# Patient Record
Sex: MALE | Race: WHITE | NOT HISPANIC OR LATINO | Employment: OTHER | URBAN - METROPOLITAN AREA
[De-identification: names, ages, dates, MRNs, and addresses within clinical notes are randomized per-mention and may not be internally consistent; named-entity substitution may affect disease eponyms.]

---

## 2023-01-01 ENCOUNTER — EVALUATION (OUTPATIENT)
Dept: OCCUPATIONAL THERAPY | Facility: CLINIC | Age: 88
End: 2023-01-01
Payer: MEDICARE

## 2023-01-01 ENCOUNTER — OFFICE VISIT (OUTPATIENT)
Dept: CARDIOLOGY | Facility: CLINIC | Age: 88
End: 2023-01-01
Payer: MEDICARE

## 2023-01-01 ENCOUNTER — OFFICE VISIT (OUTPATIENT)
Dept: PRIMARY CARE | Facility: CLINIC | Age: 88
End: 2023-01-01
Payer: MEDICARE

## 2023-01-01 ENCOUNTER — TELEPHONE (OUTPATIENT)
Dept: PRIMARY CARE | Facility: CLINIC | Age: 88
End: 2023-01-01
Payer: MEDICARE

## 2023-01-01 ENCOUNTER — LAB (OUTPATIENT)
Dept: LAB | Facility: LAB | Age: 88
End: 2023-01-01
Payer: MEDICARE

## 2023-01-01 ENCOUNTER — APPOINTMENT (OUTPATIENT)
Dept: PRIMARY CARE | Facility: CLINIC | Age: 88
End: 2023-01-01
Payer: MEDICARE

## 2023-01-01 ENCOUNTER — CLINICAL SUPPORT (OUTPATIENT)
Dept: CARDIOLOGY | Facility: CLINIC | Age: 88
End: 2023-01-01
Payer: MEDICARE

## 2023-01-01 VITALS
RESPIRATION RATE: 14 BRPM | HEART RATE: 64 BPM | SYSTOLIC BLOOD PRESSURE: 150 MMHG | BODY MASS INDEX: 27.85 KG/M2 | WEIGHT: 188 LBS | DIASTOLIC BLOOD PRESSURE: 80 MMHG | OXYGEN SATURATION: 96 % | HEIGHT: 69 IN | TEMPERATURE: 97.6 F

## 2023-01-01 VITALS
HEIGHT: 68 IN | DIASTOLIC BLOOD PRESSURE: 78 MMHG | RESPIRATION RATE: 14 BRPM | WEIGHT: 175 LBS | BODY MASS INDEX: 26.52 KG/M2 | SYSTOLIC BLOOD PRESSURE: 138 MMHG | HEART RATE: 100 BPM | OXYGEN SATURATION: 98 % | TEMPERATURE: 98 F

## 2023-01-01 VITALS
WEIGHT: 180 LBS | HEART RATE: 65 BPM | HEIGHT: 68 IN | SYSTOLIC BLOOD PRESSURE: 112 MMHG | BODY MASS INDEX: 27.28 KG/M2 | DIASTOLIC BLOOD PRESSURE: 62 MMHG

## 2023-01-01 VITALS
OXYGEN SATURATION: 99 % | HEART RATE: 65 BPM | BODY MASS INDEX: 26.81 KG/M2 | HEIGHT: 69 IN | SYSTOLIC BLOOD PRESSURE: 142 MMHG | WEIGHT: 181 LBS | DIASTOLIC BLOOD PRESSURE: 68 MMHG

## 2023-01-01 VITALS
WEIGHT: 180 LBS | BODY MASS INDEX: 26.66 KG/M2 | DIASTOLIC BLOOD PRESSURE: 82 MMHG | OXYGEN SATURATION: 96 % | HEIGHT: 69 IN | HEART RATE: 60 BPM | SYSTOLIC BLOOD PRESSURE: 148 MMHG

## 2023-01-01 DIAGNOSIS — R53.1 LEFT-SIDED WEAKNESS: ICD-10-CM

## 2023-01-01 DIAGNOSIS — R53.1 WEAKNESS: ICD-10-CM

## 2023-01-01 DIAGNOSIS — R79.89 LOW VITAMIN B12 LEVEL: ICD-10-CM

## 2023-01-01 DIAGNOSIS — N18.4 CKD (CHRONIC KIDNEY DISEASE) STAGE 4, GFR 15-29 ML/MIN (MULTI): ICD-10-CM

## 2023-01-01 DIAGNOSIS — M25.672 STIFFNESS OF BOTH ANKLE JOINTS: ICD-10-CM

## 2023-01-01 DIAGNOSIS — I69.354 HEMIPLEGIA AND HEMIPARESIS FOLLOWING CEREBRAL INFARCTION AFFECTING LEFT NON-DOMINANT SIDE (MULTI): ICD-10-CM

## 2023-01-01 DIAGNOSIS — E03.9 ACQUIRED HYPOTHYROIDISM: ICD-10-CM

## 2023-01-01 DIAGNOSIS — E03.9 HYPOTHYROIDISM, UNSPECIFIED TYPE: ICD-10-CM

## 2023-01-01 DIAGNOSIS — R23.4 SKIN TEXTURE CHANGES: ICD-10-CM

## 2023-01-01 DIAGNOSIS — I48.0 PAROXYSMAL ATRIAL FIBRILLATION (MULTI): ICD-10-CM

## 2023-01-01 DIAGNOSIS — H61.23 BILATERAL IMPACTED CERUMEN: ICD-10-CM

## 2023-01-01 DIAGNOSIS — R79.89 LOW VITAMIN B12 LEVEL: Primary | ICD-10-CM

## 2023-01-01 DIAGNOSIS — I25.10 CAD (CORONARY ARTERY DISEASE): ICD-10-CM

## 2023-01-01 DIAGNOSIS — I89.0 LYMPHEDEMA OF BOTH LOWER EXTREMITIES: Primary | ICD-10-CM

## 2023-01-01 DIAGNOSIS — I49.5 SINOATRIAL NODE DYSFUNCTION (MULTI): ICD-10-CM

## 2023-01-01 DIAGNOSIS — I63.50 CEREBROVASCULAR ACCIDENT (CVA) DUE TO OCCLUSION OF CEREBRAL ARTERY (MULTI): ICD-10-CM

## 2023-01-01 DIAGNOSIS — M25.671 STIFFNESS OF BOTH ANKLE JOINTS: ICD-10-CM

## 2023-01-01 DIAGNOSIS — I10 ESSENTIAL HYPERTENSION: ICD-10-CM

## 2023-01-01 DIAGNOSIS — D63.1 ANEMIA IN CHRONIC KIDNEY DISEASE (CODE): Primary | ICD-10-CM

## 2023-01-01 DIAGNOSIS — E11.22 TYPE 2 DIABETES MELLITUS WITH CHRONIC KIDNEY DISEASE, WITHOUT LONG-TERM CURRENT USE OF INSULIN, UNSPECIFIED CKD STAGE (MULTI): ICD-10-CM

## 2023-01-01 DIAGNOSIS — D64.9 ANEMIA, UNSPECIFIED TYPE: ICD-10-CM

## 2023-01-01 DIAGNOSIS — J44.9 CHRONIC OBSTRUCTIVE PULMONARY DISEASE, UNSPECIFIED COPD TYPE (MULTI): ICD-10-CM

## 2023-01-01 DIAGNOSIS — I49.5 SICK SINUS SYNDROME (MULTI): ICD-10-CM

## 2023-01-01 DIAGNOSIS — N25.81 SECONDARY HYPERPARATHYROIDISM OF RENAL ORIGIN (MULTI): ICD-10-CM

## 2023-01-01 DIAGNOSIS — K59.01 SLOW TRANSIT CONSTIPATION: ICD-10-CM

## 2023-01-01 DIAGNOSIS — N18.4 CHRONIC KIDNEY DISEASE, STAGE 4 (SEVERE) (MULTI): ICD-10-CM

## 2023-01-01 DIAGNOSIS — R26.81 UNSTEADY GAIT: ICD-10-CM

## 2023-01-01 DIAGNOSIS — I89.0 LYMPHEDEMA OF BOTH LOWER EXTREMITIES: ICD-10-CM

## 2023-01-01 DIAGNOSIS — N18.32 STAGE 3B CHRONIC KIDNEY DISEASE (MULTI): ICD-10-CM

## 2023-01-01 DIAGNOSIS — R07.9 CHEST PAIN, UNSPECIFIED TYPE: ICD-10-CM

## 2023-01-01 DIAGNOSIS — R06.09 DYSPNEA ON EXERTION: ICD-10-CM

## 2023-01-01 DIAGNOSIS — G63 POLYNEUROPATHY ASSOCIATED WITH UNDERLYING DISEASE (MULTI): ICD-10-CM

## 2023-01-01 DIAGNOSIS — Z95.0 PACEMAKER: Primary | ICD-10-CM

## 2023-01-01 DIAGNOSIS — R07.9 CHEST PAIN: Primary | ICD-10-CM

## 2023-01-01 DIAGNOSIS — R60.9 EDEMA, UNSPECIFIED TYPE: Primary | ICD-10-CM

## 2023-01-01 DIAGNOSIS — E78.5 DYSLIPIDEMIA: ICD-10-CM

## 2023-01-01 DIAGNOSIS — R60.9 EDEMA, UNSPECIFIED TYPE: ICD-10-CM

## 2023-01-01 DIAGNOSIS — H35.3211 EXUDATIVE AGE-RELATED MACULAR DEGENERATION, RIGHT EYE, WITH ACTIVE CHOROIDAL NEOVASCULARIZATION (MULTI): ICD-10-CM

## 2023-01-01 DIAGNOSIS — N18.5 CHRONIC KIDNEY DISEASE, STAGE 5 (MULTI): ICD-10-CM

## 2023-01-01 DIAGNOSIS — Z95.810 PRESENCE OF BIVENTRICULAR IMPLANTABLE CARDIOVERTER-DEFIBRILLATOR (ICD): Primary | ICD-10-CM

## 2023-01-01 DIAGNOSIS — R73.9 ELEVATED BLOOD SUGAR: ICD-10-CM

## 2023-01-01 DIAGNOSIS — I50.32 CHRONIC DIASTOLIC HEART FAILURE (MULTI): ICD-10-CM

## 2023-01-01 DIAGNOSIS — Z95.0 CARDIAC PACEMAKER: ICD-10-CM

## 2023-01-01 DIAGNOSIS — Z95.0 CARDIAC PACEMAKER IN SITU: ICD-10-CM

## 2023-01-01 DIAGNOSIS — R53.1 WEAKNESS GENERALIZED: ICD-10-CM

## 2023-01-01 LAB
ALBUMIN SERPL BCP-MCNC: 3.6 G/DL (ref 3.4–5)
ALP SERPL-CCNC: 93 U/L (ref 33–136)
ALT SERPL W P-5'-P-CCNC: 13 U/L (ref 10–52)
ANION GAP SERPL CALC-SCNC: 12 MMOL/L (ref 10–20)
AST SERPL W P-5'-P-CCNC: 17 U/L (ref 9–39)
BILIRUB SERPL-MCNC: 0.4 MG/DL (ref 0–1.2)
BUN SERPL-MCNC: 34 MG/DL (ref 6–23)
CALCIUM SERPL-MCNC: 8.3 MG/DL (ref 8.6–10.3)
CHLORIDE SERPL-SCNC: 109 MMOL/L (ref 98–107)
CO2 SERPL-SCNC: 25 MMOL/L (ref 21–32)
CREAT SERPL-MCNC: 2.14 MG/DL (ref 0.5–1.3)
ERYTHROCYTE [DISTWIDTH] IN BLOOD BY AUTOMATED COUNT: 13.8 % (ref 11.5–14.5)
EST. AVERAGE GLUCOSE BLD GHB EST-MCNC: 123 MG/DL
FERRITIN SERPL-MCNC: 109 NG/ML (ref 20–300)
GFR SERPL CREATININE-BSD FRML MDRD: 28 ML/MIN/1.73M*2
GLUCOSE SERPL-MCNC: 88 MG/DL (ref 74–99)
HBA1C MFR BLD: 5.9 %
HCT VFR BLD AUTO: 34 % (ref 41–52)
HGB BLD-MCNC: 10.5 G/DL (ref 13.5–17.5)
IRON SATN MFR SERPL: 24 % (ref 25–45)
IRON SERPL-MCNC: 62 UG/DL (ref 35–150)
MCH RBC QN AUTO: 32.2 PG (ref 26–34)
MCHC RBC AUTO-ENTMCNC: 30.9 G/DL (ref 32–36)
MCV RBC AUTO: 104 FL (ref 80–100)
NRBC BLD-RTO: 0 /100 WBCS (ref 0–0)
PHOSPHATE SERPL-MCNC: 3.8 MG/DL (ref 2.5–4.9)
PLATELET # BLD AUTO: 219 X10*3/UL (ref 150–450)
POTASSIUM SERPL-SCNC: 4.9 MMOL/L (ref 3.5–5.3)
PROT SERPL-MCNC: 6.1 G/DL (ref 6.4–8.2)
PTH-INTACT SERPL-MCNC: 175 PG/ML (ref 18.5–88)
RBC # BLD AUTO: 3.26 X10*6/UL (ref 4.5–5.9)
SODIUM SERPL-SCNC: 141 MMOL/L (ref 136–145)
TIBC SERPL-MCNC: 256 UG/DL (ref 240–445)
TSH SERPL-ACNC: 4.45 MIU/L (ref 0.44–3.98)
UIBC SERPL-MCNC: 194 UG/DL (ref 110–370)
WBC # BLD AUTO: 5.6 X10*3/UL (ref 4.4–11.3)

## 2023-01-01 PROCEDURE — 99214 OFFICE O/P EST MOD 30 MIN: CPT | Performed by: INTERNAL MEDICINE

## 2023-01-01 PROCEDURE — 1159F MED LIST DOCD IN RCRD: CPT | Performed by: INTERNAL MEDICINE

## 2023-01-01 PROCEDURE — 1160F RVW MEDS BY RX/DR IN RCRD: CPT | Performed by: INTERNAL MEDICINE

## 2023-01-01 PROCEDURE — 3074F SYST BP LT 130 MM HG: CPT | Performed by: INTERNAL MEDICINE

## 2023-01-01 PROCEDURE — 82728 ASSAY OF FERRITIN: CPT

## 2023-01-01 PROCEDURE — 93288 INTERROG EVL PM/LDLS PM IP: CPT | Performed by: INTERNAL MEDICINE

## 2023-01-01 PROCEDURE — 3075F SYST BP GE 130 - 139MM HG: CPT | Performed by: INTERNAL MEDICINE

## 2023-01-01 PROCEDURE — 3078F DIAST BP <80 MM HG: CPT | Performed by: INTERNAL MEDICINE

## 2023-01-01 PROCEDURE — 96372 THER/PROPH/DIAG INJ SC/IM: CPT | Performed by: INTERNAL MEDICINE

## 2023-01-01 PROCEDURE — 1157F ADVNC CARE PLAN IN RCRD: CPT | Performed by: INTERNAL MEDICINE

## 2023-01-01 PROCEDURE — 36415 COLL VENOUS BLD VENIPUNCTURE: CPT

## 2023-01-01 PROCEDURE — 1036F TOBACCO NON-USER: CPT | Performed by: INTERNAL MEDICINE

## 2023-01-01 PROCEDURE — 1126F AMNT PAIN NOTED NONE PRSNT: CPT | Performed by: INTERNAL MEDICINE

## 2023-01-01 PROCEDURE — 83036 HEMOGLOBIN GLYCOSYLATED A1C: CPT

## 2023-01-01 PROCEDURE — 90662 IIV NO PRSV INCREASED AG IM: CPT | Performed by: INTERNAL MEDICINE

## 2023-01-01 PROCEDURE — 3077F SYST BP >= 140 MM HG: CPT | Performed by: INTERNAL MEDICINE

## 2023-01-01 PROCEDURE — 99215 OFFICE O/P EST HI 40 MIN: CPT | Performed by: INTERNAL MEDICINE

## 2023-01-01 PROCEDURE — 83540 ASSAY OF IRON: CPT

## 2023-01-01 PROCEDURE — 3079F DIAST BP 80-89 MM HG: CPT | Performed by: INTERNAL MEDICINE

## 2023-01-01 PROCEDURE — 80053 COMPREHEN METABOLIC PANEL: CPT

## 2023-01-01 PROCEDURE — 99213 OFFICE O/P EST LOW 20 MIN: CPT | Performed by: INTERNAL MEDICINE

## 2023-01-01 PROCEDURE — 85027 COMPLETE CBC AUTOMATED: CPT

## 2023-01-01 PROCEDURE — 84443 ASSAY THYROID STIM HORMONE: CPT

## 2023-01-01 PROCEDURE — 83970 ASSAY OF PARATHORMONE: CPT

## 2023-01-01 PROCEDURE — G0008 ADMIN INFLUENZA VIRUS VAC: HCPCS | Performed by: INTERNAL MEDICINE

## 2023-01-01 PROCEDURE — 97166 OT EVAL MOD COMPLEX 45 MIN: CPT | Mod: GO

## 2023-01-01 PROCEDURE — 97535 SELF CARE MNGMENT TRAINING: CPT | Mod: GO

## 2023-01-01 PROCEDURE — 83550 IRON BINDING TEST: CPT

## 2023-01-01 PROCEDURE — 84100 ASSAY OF PHOSPHORUS: CPT

## 2023-01-01 RX ORDER — NEOMYCIN SULFATE, POLYMYXIN B SULFATE, AND DEXAMETHASONE 3.5; 10000; 1 MG/G; [USP'U]/G; MG/G
OINTMENT OPHTHALMIC
COMMUNITY
Start: 2023-01-01 | End: 2024-01-01 | Stop reason: ENTERED-IN-ERROR

## 2023-01-01 RX ORDER — AMOXICILLIN 500 MG/1
CAPSULE ORAL
COMMUNITY
Start: 2023-01-01 | End: 2023-01-01 | Stop reason: ALTCHOICE

## 2023-01-01 RX ORDER — CYANOCOBALAMIN 1000 UG/ML
1000 INJECTION, SOLUTION INTRAMUSCULAR; SUBCUTANEOUS ONCE
Status: COMPLETED | OUTPATIENT
Start: 2023-01-01 | End: 2023-01-01

## 2023-01-01 RX ORDER — LEVOTHYROXINE SODIUM 50 UG/1
TABLET ORAL
Qty: 30 TABLET | Refills: 0 | Status: CANCELLED | OUTPATIENT
Start: 2023-01-01

## 2023-01-01 RX ORDER — DIGOXIN 0.25 MG/ML
INJECTION INTRAMUSCULAR; INTRAVENOUS
COMMUNITY
Start: 2005-03-10 | End: 2023-01-01 | Stop reason: ALTCHOICE

## 2023-01-01 RX ORDER — NITROGLYCERIN 0.4 MG/1
0.4 TABLET SUBLINGUAL EVERY 5 MIN PRN
COMMUNITY
Start: 2023-02-14 | End: 2023-01-01 | Stop reason: SDUPTHER

## 2023-01-01 RX ORDER — ROSUVASTATIN CALCIUM 10 MG/1
10 TABLET, COATED ORAL NIGHTLY
COMMUNITY
End: 2023-01-01 | Stop reason: ALTCHOICE

## 2023-01-01 RX ORDER — FUROSEMIDE 20 MG/1
20 TABLET ORAL DAILY PRN
Qty: 30 TABLET | Refills: 11 | Status: ON HOLD | OUTPATIENT
Start: 2023-01-01 | End: 2024-01-01 | Stop reason: SDUPTHER

## 2023-01-01 RX ORDER — LEVOTHYROXINE SODIUM 50 UG/1
TABLET ORAL
Qty: 30 TABLET | Refills: 0 | Status: SHIPPED | OUTPATIENT
Start: 2023-01-01 | End: 2023-01-01 | Stop reason: ALTCHOICE

## 2023-01-01 RX ORDER — ATORVASTATIN CALCIUM 40 MG/1
40 TABLET, FILM COATED ORAL
COMMUNITY
Start: 2017-07-11 | End: 2023-01-01 | Stop reason: ALTCHOICE

## 2023-01-01 RX ORDER — LEVOTHYROXINE SODIUM 25 UG/1
TABLET ORAL
COMMUNITY
Start: 2006-09-27 | End: 2023-01-01 | Stop reason: ALTCHOICE

## 2023-01-01 RX ORDER — NITROGLYCERIN 0.4 MG/1
0.4 TABLET SUBLINGUAL EVERY 5 MIN PRN
Qty: 90 TABLET | Refills: 0 | Status: SHIPPED | OUTPATIENT
Start: 2023-01-01 | End: 2024-01-01

## 2023-01-01 RX ORDER — TAMSULOSIN HYDROCHLORIDE 0.4 MG/1
CAPSULE ORAL
COMMUNITY
Start: 2005-03-10 | End: 2023-01-01 | Stop reason: ALTCHOICE

## 2023-01-01 RX ORDER — ATORVASTATIN CALCIUM 10 MG/1
10 TABLET, FILM COATED ORAL DAILY
COMMUNITY
Start: 2005-03-10 | End: 2023-01-01 | Stop reason: ALTCHOICE

## 2023-01-01 RX ORDER — LISINOPRIL 10 MG/1
10 TABLET ORAL
COMMUNITY
Start: 2017-08-01 | End: 2023-01-01 | Stop reason: ALTCHOICE

## 2023-01-01 RX ORDER — METOPROLOL TARTRATE 50 MG/1
50 TABLET ORAL 2 TIMES DAILY
COMMUNITY

## 2023-01-01 RX ORDER — LEVOTHYROXINE SODIUM 50 UG/1
50 TABLET ORAL
Qty: 90 TABLET | Refills: 3 | Status: SHIPPED | OUTPATIENT
Start: 2023-01-01 | End: 2023-01-01

## 2023-01-01 RX ORDER — LEVOTHYROXINE SODIUM 75 UG/1
75 TABLET ORAL DAILY
Qty: 30 TABLET | Refills: 11 | Status: SHIPPED | OUTPATIENT
Start: 2023-01-01 | End: 2024-06-01

## 2023-01-01 RX ORDER — ERYTHROMYCIN 5 MG/G
OINTMENT OPHTHALMIC
COMMUNITY
Start: 2023-04-24 | End: 2024-01-01 | Stop reason: ENTERED-IN-ERROR

## 2023-01-01 RX ADMIN — CYANOCOBALAMIN 1000 MCG: 1000 INJECTION, SOLUTION INTRAMUSCULAR; SUBCUTANEOUS at 13:24

## 2023-01-01 ASSESSMENT — ENCOUNTER SYMPTOMS
WHEEZING: 0
CONSTIPATION: 0
GASTROINTESTINAL NEGATIVE: 1
ABDOMINAL PAIN: 0
PSYCHIATRIC NEGATIVE: 1
RESPIRATORY NEGATIVE: 1
HEMATOLOGIC/LYMPHATIC NEGATIVE: 1
PALPITATIONS: 0
OCCASIONAL FEELINGS OF UNSTEADINESS: 1
ENDOCRINE NEGATIVE: 1
COUGH: 0
BLOOD IN STOOL: 0
OCCASIONAL FEELINGS OF UNSTEADINESS: 1
SHORTNESS OF BREATH: 0
EYES NEGATIVE: 1
UNEXPECTED WEIGHT CHANGE: 1
MUSCULOSKELETAL NEGATIVE: 1
CONSTITUTIONAL NEGATIVE: 1
DEPRESSION: 1
RHINORRHEA: 1
WHEEZING: 0
COUGH: 0
LOSS OF SENSATION IN FEET: 0
DIARRHEA: 0
NEUROLOGICAL NEGATIVE: 1
HYPERTENSION: 1
SHORTNESS OF BREATH: 0
PALPITATIONS: 0
LOSS OF SENSATION IN FEET: 0
DEPRESSION: 1

## 2023-01-01 ASSESSMENT — PAIN SCALES - GENERAL: PAINLEVEL: 0-NO PAIN

## 2023-01-01 ASSESSMENT — ACTIVITIES OF DAILY LIVING (ADL): HOME_MANAGEMENT_TIME_ENTRY: 10

## 2023-03-27 ENCOUNTER — TELEPHONE (OUTPATIENT)
Dept: PRIMARY CARE | Facility: CLINIC | Age: 88
End: 2023-03-27
Payer: MEDICARE

## 2023-03-27 NOTE — TELEPHONE ENCOUNTER
Pt called about an eye dr that was referred to him - he does not recall the name - please advise.

## 2023-04-27 LAB
ALANINE AMINOTRANSFERASE (SGPT) (U/L) IN SER/PLAS: 16 U/L (ref 10–52)
ALBUMIN (G/DL) IN SER/PLAS: 3.8 G/DL (ref 3.4–5)
ALBUMIN (MG/L) IN URINE: 454.9 MG/L
ALBUMIN/CREATININE (UG/MG) IN URINE: 619.8 UG/MG CRT (ref 0–30)
ALKALINE PHOSPHATASE (U/L) IN SER/PLAS: 78 U/L (ref 33–136)
ANION GAP IN SER/PLAS: 11 MMOL/L (ref 10–20)
APPEARANCE, URINE: ABNORMAL
ASPARTATE AMINOTRANSFERASE (SGOT) (U/L) IN SER/PLAS: 22 U/L (ref 9–39)
BILIRUBIN TOTAL (MG/DL) IN SER/PLAS: 0.4 MG/DL (ref 0–1.2)
BILIRUBIN, URINE: NEGATIVE
BLOOD, URINE: NEGATIVE
CALCIUM (MG/DL) IN SER/PLAS: 8.7 MG/DL (ref 8.6–10.3)
CARBON DIOXIDE, TOTAL (MMOL/L) IN SER/PLAS: 26 MMOL/L (ref 21–32)
CHLORIDE (MMOL/L) IN SER/PLAS: 108 MMOL/L (ref 98–107)
CHOLESTEROL (MG/DL) IN SER/PLAS: 138 MG/DL (ref 0–199)
CHOLESTEROL IN HDL (MG/DL) IN SER/PLAS: 51.3 MG/DL
CHOLESTEROL/HDL RATIO: 2.7
COBALAMIN (VITAMIN B12) (PG/ML) IN SER/PLAS: 287 PG/ML (ref 211–911)
COLOR, URINE: YELLOW
CREATININE (MG/DL) IN SER/PLAS: 1.84 MG/DL (ref 0.5–1.3)
CREATININE (MG/DL) IN URINE: 73.4 MG/DL (ref 20–370)
CREATININE (MG/DL) IN URINE: 73.4 MG/DL (ref 20–370)
ERYTHROCYTE DISTRIBUTION WIDTH (RATIO) BY AUTOMATED COUNT: 13.8 % (ref 11.5–14.5)
ERYTHROCYTE MEAN CORPUSCULAR HEMOGLOBIN CONCENTRATION (G/DL) BY AUTOMATED: 30.9 G/DL (ref 32–36)
ERYTHROCYTE MEAN CORPUSCULAR VOLUME (FL) BY AUTOMATED COUNT: 104 FL (ref 80–100)
ERYTHROCYTES (10*6/UL) IN BLOOD BY AUTOMATED COUNT: 3.13 X10E12/L (ref 4.5–5.9)
ESTIMATED AVERAGE GLUCOSE FOR HBA1C: 126 MG/DL
GFR MALE: 34 ML/MIN/1.73M2
GLUCOSE (MG/DL) IN SER/PLAS: 72 MG/DL (ref 74–99)
GLUCOSE, URINE: NEGATIVE MG/DL
HEMATOCRIT (%) IN BLOOD BY AUTOMATED COUNT: 32.4 % (ref 41–52)
HEMOGLOBIN (G/DL) IN BLOOD: 10 G/DL (ref 13.5–17.5)
HEMOGLOBIN A1C/HEMOGLOBIN TOTAL IN BLOOD: 6 %
IRON (UG/DL) IN SER/PLAS: 68 UG/DL (ref 35–150)
KETONES, URINE: NEGATIVE MG/DL
LDL: 67 MG/DL (ref 0–99)
LEUKOCYTE ESTERASE, URINE: NEGATIVE
LEUKOCYTES (10*3/UL) IN BLOOD BY AUTOMATED COUNT: 5 X10E9/L (ref 4.4–11.3)
NITRITE, URINE: NEGATIVE
PH, URINE: 6 (ref 5–8)
PHOSPHATE (MG/DL) IN SER/PLAS: 3.6 MG/DL (ref 2.5–4.9)
PLATELETS (10*3/UL) IN BLOOD AUTOMATED COUNT: 176 X10E9/L (ref 150–450)
POTASSIUM (MMOL/L) IN SER/PLAS: 4.7 MMOL/L (ref 3.5–5.3)
PROTEIN (MG/DL) IN URINE: 86 MG/DL (ref 5–25)
PROTEIN TOTAL: 6.5 G/DL (ref 6.4–8.2)
PROTEIN, URINE: ABNORMAL MG/DL
PROTEIN/CREATININE (MG/MG) IN URINE: 1.17 MG/MG CREAT (ref 0–0.17)
RBC, URINE: 1 /HPF (ref 0–5)
SODIUM (MMOL/L) IN SER/PLAS: 140 MMOL/L (ref 136–145)
SPECIFIC GRAVITY, URINE: 1.02 (ref 1–1.03)
THYROTROPIN (MIU/L) IN SER/PLAS BY DETECTION LIMIT <= 0.05 MIU/L: 3.92 MIU/L (ref 0.44–3.98)
TRIGLYCERIDE (MG/DL) IN SER/PLAS: 101 MG/DL (ref 0–149)
URATE (MG/DL) IN SER/PLAS: 5.4 MG/DL (ref 4–7.5)
UREA NITROGEN (MG/DL) IN SER/PLAS: 28 MG/DL (ref 6–23)
UROBILINOGEN, URINE: <2 MG/DL (ref 0–1.9)
VLDL: 20 MG/DL (ref 0–40)
WBC, URINE: 1 /HPF (ref 0–5)

## 2023-04-28 LAB — PARATHYRIN INTACT (PG/ML) IN SER/PLAS: 173.2 PG/ML (ref 18.5–88)

## 2023-05-04 ENCOUNTER — APPOINTMENT (OUTPATIENT)
Dept: PRIMARY CARE | Facility: CLINIC | Age: 88
End: 2023-05-04
Payer: MEDICARE

## 2023-05-08 PROBLEM — M25.672 ANKLE JOINT STIFFNESS, BILATERAL: Status: ACTIVE | Noted: 2023-05-08

## 2023-05-08 PROBLEM — K92.1 HEMATOCHEZIA: Status: ACTIVE | Noted: 2023-05-08

## 2023-05-08 PROBLEM — K59.01 SLOW TRANSIT CONSTIPATION: Status: ACTIVE | Noted: 2023-05-08

## 2023-05-08 PROBLEM — G45.9 TIA (TRANSIENT ISCHEMIC ATTACK): Status: ACTIVE | Noted: 2023-05-08

## 2023-05-08 PROBLEM — R09.89 BRUIT OF RIGHT CAROTID ARTERY: Status: ACTIVE | Noted: 2023-05-08

## 2023-05-08 PROBLEM — M25.661 KNEE JOINT STIFFNESS, BILATERAL: Status: ACTIVE | Noted: 2023-05-08

## 2023-05-08 PROBLEM — I89.0 LYMPHEDEMA, LIMB: Status: ACTIVE | Noted: 2023-05-08

## 2023-05-08 PROBLEM — I10 ESSENTIAL HYPERTENSION: Status: ACTIVE | Noted: 2023-05-08

## 2023-05-08 PROBLEM — R29.3 ABNORMAL POSTURE: Status: ACTIVE | Noted: 2023-05-08

## 2023-05-08 PROBLEM — I61.9 CEREBRAL HEMORRHAGE (MULTI): Status: ACTIVE | Noted: 2023-05-08

## 2023-05-08 PROBLEM — M54.2 CERVICALGIA: Status: ACTIVE | Noted: 2023-05-08

## 2023-05-08 PROBLEM — E03.9 HYPOTHYROIDISM: Status: ACTIVE | Noted: 2023-05-08

## 2023-05-08 PROBLEM — I50.32 CHRONIC DIASTOLIC HEART FAILURE (MULTI): Status: ACTIVE | Noted: 2023-05-08

## 2023-05-08 PROBLEM — R53.1 LEFT-SIDED WEAKNESS: Status: ACTIVE | Noted: 2023-05-08

## 2023-05-08 PROBLEM — G62.9 PERIPHERAL NEUROPATHY: Status: ACTIVE | Noted: 2023-05-08

## 2023-05-08 PROBLEM — Z95.0 CARDIAC PACEMAKER: Status: ACTIVE | Noted: 2023-05-08

## 2023-05-08 PROBLEM — K59.00 CONSTIPATION: Status: ACTIVE | Noted: 2023-05-08

## 2023-05-08 PROBLEM — S22.009A THORACIC VERTEBRAL FRACTURE (MULTI): Status: ACTIVE | Noted: 2023-05-08

## 2023-05-08 PROBLEM — M25.671 ANKLE JOINT STIFFNESS, BILATERAL: Status: ACTIVE | Noted: 2023-05-08

## 2023-05-08 PROBLEM — M79.606 LEG PAIN: Status: ACTIVE | Noted: 2023-05-08

## 2023-05-08 PROBLEM — R26.9 ABNORMAL GAIT: Status: ACTIVE | Noted: 2023-05-08

## 2023-05-08 PROBLEM — I48.0 PAROXYSMAL ATRIAL FIBRILLATION (MULTI): Status: ACTIVE | Noted: 2023-05-08

## 2023-05-08 PROBLEM — M25.662 KNEE JOINT STIFFNESS, BILATERAL: Status: ACTIVE | Noted: 2023-05-08

## 2023-05-08 PROBLEM — R23.4 SKIN TEXTURE CHANGES: Status: ACTIVE | Noted: 2023-05-08

## 2023-05-08 PROBLEM — R04.0 EPISTAXIS: Status: ACTIVE | Noted: 2023-05-08

## 2023-05-08 PROBLEM — H61.23 IMPACTED CERUMEN OF BOTH EARS: Status: RESOLVED | Noted: 2023-05-08 | Resolved: 2023-05-08

## 2023-05-08 PROBLEM — I83.891 VARICOSE VEINS OF RIGHT LOWER EXTREMITY WITH COMPLICATIONS: Status: ACTIVE | Noted: 2023-05-08

## 2023-05-08 PROBLEM — E78.5 DYSLIPIDEMIA: Status: ACTIVE | Noted: 2023-05-08

## 2023-05-08 PROBLEM — I49.5 SICK SINUS SYNDROME DUE TO SA NODE DYSFUNCTION (MULTI): Status: ACTIVE | Noted: 2023-05-08

## 2023-05-08 PROBLEM — R60.9 EDEMA: Status: ACTIVE | Noted: 2023-05-08

## 2023-05-08 PROBLEM — L03.119 CELLULITIS, LEG: Status: ACTIVE | Noted: 2023-05-08

## 2023-05-08 PROBLEM — I63.9 CVA (CEREBRAL VASCULAR ACCIDENT) (MULTI): Status: ACTIVE | Noted: 2023-05-08

## 2023-05-08 PROBLEM — R73.9 ELEVATED BLOOD SUGAR: Status: ACTIVE | Noted: 2023-05-08

## 2023-05-08 PROBLEM — I89.0 LYMPHEDEMA OF BOTH LOWER EXTREMITIES: Status: ACTIVE | Noted: 2023-05-08

## 2023-05-08 PROBLEM — Z95.1 S/P CABG X 3: Status: ACTIVE | Noted: 2023-05-08

## 2023-05-08 PROBLEM — R53.1 WEAKNESS: Status: ACTIVE | Noted: 2023-05-08

## 2023-05-08 PROBLEM — J30.0 VASOMOTOR RHINITIS: Status: ACTIVE | Noted: 2023-05-08

## 2023-05-08 PROBLEM — K62.5 RECTAL BLEEDING: Status: ACTIVE | Noted: 2023-05-08

## 2023-05-08 PROBLEM — N18.31 STAGE 3A CHRONIC KIDNEY DISEASE (MULTI): Status: ACTIVE | Noted: 2023-05-08

## 2023-05-08 PROBLEM — R27.8 DECREASED COORDINATION: Status: ACTIVE | Noted: 2023-05-08

## 2023-05-08 PROBLEM — R21 RASH: Status: ACTIVE | Noted: 2023-05-08

## 2023-05-08 PROBLEM — R26.81 UNSTEADY GAIT: Status: ACTIVE | Noted: 2023-05-08

## 2023-05-08 PROBLEM — H61.22 IMPACTED CERUMEN OF LEFT EAR: Status: RESOLVED | Noted: 2023-05-08 | Resolved: 2023-05-08

## 2023-05-08 PROBLEM — D64.9 ANEMIA: Status: ACTIVE | Noted: 2023-05-08

## 2023-05-08 PROBLEM — I25.10 CAD (CORONARY ARTERY DISEASE): Status: ACTIVE | Noted: 2023-05-08

## 2023-05-08 RX ORDER — LEVOTHYROXINE SODIUM 50 UG/1
1 TABLET ORAL DAILY
COMMUNITY
Start: 2013-10-21 | End: 2023-05-09 | Stop reason: SDUPTHER

## 2023-05-08 RX ORDER — FERROUS SULFATE 325(65) MG
TABLET ORAL
COMMUNITY
End: 2024-01-01 | Stop reason: ALTCHOICE

## 2023-05-08 RX ORDER — METOPROLOL TARTRATE 25 MG/1
1 TABLET, FILM COATED ORAL 2 TIMES DAILY
COMMUNITY
Start: 2019-09-10 | End: 2023-01-01 | Stop reason: ALTCHOICE

## 2023-05-08 RX ORDER — VIT C/E/ZN/COPPR/LUTEIN/ZEAXAN 250MG-90MG
CAPSULE ORAL 2 TIMES DAILY
COMMUNITY

## 2023-05-08 RX ORDER — ISOSORBIDE MONONITRATE 60 MG/1
1 TABLET, EXTENDED RELEASE ORAL 2 TIMES DAILY
COMMUNITY
Start: 2021-03-17

## 2023-05-08 RX ORDER — ROSUVASTATIN CALCIUM 10 MG/1
TABLET, COATED ORAL
COMMUNITY
End: 2023-05-09 | Stop reason: SINTOL

## 2023-05-09 ENCOUNTER — OFFICE VISIT (OUTPATIENT)
Dept: PRIMARY CARE | Facility: CLINIC | Age: 88
End: 2023-05-09
Payer: MEDICARE

## 2023-05-09 VITALS
DIASTOLIC BLOOD PRESSURE: 80 MMHG | BODY MASS INDEX: 26.98 KG/M2 | TEMPERATURE: 98 F | HEIGHT: 68 IN | SYSTOLIC BLOOD PRESSURE: 130 MMHG | OXYGEN SATURATION: 98 % | WEIGHT: 178 LBS | HEART RATE: 70 BPM | RESPIRATION RATE: 14 BRPM

## 2023-05-09 DIAGNOSIS — I10 ESSENTIAL HYPERTENSION: Primary | ICD-10-CM

## 2023-05-09 DIAGNOSIS — I49.5 SICK SINUS SYNDROME DUE TO SA NODE DYSFUNCTION (MULTI): ICD-10-CM

## 2023-05-09 DIAGNOSIS — E03.9 HYPOTHYROIDISM, UNSPECIFIED TYPE: ICD-10-CM

## 2023-05-09 DIAGNOSIS — D64.9 ANEMIA, UNSPECIFIED TYPE: ICD-10-CM

## 2023-05-09 DIAGNOSIS — R79.89 LOW VITAMIN B12 LEVEL: ICD-10-CM

## 2023-05-09 DIAGNOSIS — I50.32 CHRONIC DIASTOLIC HEART FAILURE (MULTI): ICD-10-CM

## 2023-05-09 DIAGNOSIS — N18.4 CKD (CHRONIC KIDNEY DISEASE) STAGE 4, GFR 15-29 ML/MIN (MULTI): ICD-10-CM

## 2023-05-09 PROCEDURE — 3079F DIAST BP 80-89 MM HG: CPT | Performed by: INTERNAL MEDICINE

## 2023-05-09 PROCEDURE — 99214 OFFICE O/P EST MOD 30 MIN: CPT | Performed by: INTERNAL MEDICINE

## 2023-05-09 PROCEDURE — 1170F FXNL STATUS ASSESSED: CPT | Performed by: INTERNAL MEDICINE

## 2023-05-09 PROCEDURE — G0439 PPPS, SUBSEQ VISIT: HCPCS | Performed by: INTERNAL MEDICINE

## 2023-05-09 PROCEDURE — 1160F RVW MEDS BY RX/DR IN RCRD: CPT | Performed by: INTERNAL MEDICINE

## 2023-05-09 PROCEDURE — 96372 THER/PROPH/DIAG INJ SC/IM: CPT | Performed by: INTERNAL MEDICINE

## 2023-05-09 PROCEDURE — 1159F MED LIST DOCD IN RCRD: CPT | Performed by: INTERNAL MEDICINE

## 2023-05-09 PROCEDURE — 1157F ADVNC CARE PLAN IN RCRD: CPT | Performed by: INTERNAL MEDICINE

## 2023-05-09 PROCEDURE — 1036F TOBACCO NON-USER: CPT | Performed by: INTERNAL MEDICINE

## 2023-05-09 PROCEDURE — 3075F SYST BP GE 130 - 139MM HG: CPT | Performed by: INTERNAL MEDICINE

## 2023-05-09 RX ORDER — MULTIVIT-MIN/FA/LYCOPEN/LUTEIN .4-300-25
TABLET ORAL
COMMUNITY
Start: 2005-03-10 | End: 2023-01-01 | Stop reason: ALTCHOICE

## 2023-05-09 RX ORDER — CYANOCOBALAMIN 1000 UG/ML
1000 INJECTION, SOLUTION INTRAMUSCULAR; SUBCUTANEOUS ONCE
Status: COMPLETED | OUTPATIENT
Start: 2023-05-09 | End: 2023-05-09

## 2023-05-09 RX ORDER — LANOLIN ALCOHOL/MO/W.PET/CERES
100 CREAM (GRAM) TOPICAL DAILY
COMMUNITY
End: 2023-01-01 | Stop reason: ALTCHOICE

## 2023-05-09 RX ORDER — LEVOTHYROXINE SODIUM 50 UG/1
50 TABLET ORAL DAILY
Qty: 30 TABLET | Refills: 3 | Status: SHIPPED | OUTPATIENT
Start: 2023-05-09 | End: 2023-01-01

## 2023-05-09 RX ORDER — DOCUSATE SODIUM 100 MG/1
100 CAPSULE, LIQUID FILLED ORAL 2 TIMES DAILY PRN
COMMUNITY
Start: 2005-03-10

## 2023-05-09 RX ADMIN — CYANOCOBALAMIN 1000 MCG: 1000 INJECTION, SOLUTION INTRAMUSCULAR; SUBCUTANEOUS at 14:54

## 2023-05-09 ASSESSMENT — ENCOUNTER SYMPTOMS
SHORTNESS OF BREATH: 0
PALPITATIONS: 0
OCCASIONAL FEELINGS OF UNSTEADINESS: 0
ABDOMINAL PAIN: 0
DEPRESSION: 0
LOSS OF SENSATION IN FEET: 0
WHEEZING: 0

## 2023-05-09 ASSESSMENT — PATIENT HEALTH QUESTIONNAIRE - PHQ9
10. IF YOU CHECKED OFF ANY PROBLEMS, HOW DIFFICULT HAVE THESE PROBLEMS MADE IT FOR YOU TO DO YOUR WORK, TAKE CARE OF THINGS AT HOME, OR GET ALONG WITH OTHER PEOPLE: NOT DIFFICULT AT ALL
SUM OF ALL RESPONSES TO PHQ9 QUESTIONS 1 AND 2: 2
1. LITTLE INTEREST OR PLEASURE IN DOING THINGS: SEVERAL DAYS
2. FEELING DOWN, DEPRESSED OR HOPELESS: SEVERAL DAYS

## 2023-05-09 ASSESSMENT — ACTIVITIES OF DAILY LIVING (ADL)
BATHING: INDEPENDENT
DRESSING: INDEPENDENT

## 2023-05-09 NOTE — PROGRESS NOTES
"Subjective   Patient ID: Amari Boyer is a 89 y.o. male who presents for anemia, swelling and CKD follow up as well as Medicare Annual Wellness Visit Subsequent (Medicare wellness.).  Swelling and redness of legs is much improved.    He has been feeling pretty well.  Complains of getting cold easily.    We reviewed and discussed his recent lab results.      Review of Systems   Respiratory:  Negative for shortness of breath and wheezing.    Cardiovascular:  Negative for chest pain and palpitations.   Gastrointestinal:  Negative for abdominal pain.       Objective   /80 (BP Location: Left arm, Patient Position: Sitting, BP Cuff Size: Adult)   Pulse 70   Temp 36.7 °C (98 °F) (Tympanic)   Resp 14   Ht 1.727 m (5' 8\")   Wt 80.7 kg (178 lb)   SpO2 98%   BMI 27.06 kg/m²     Physical Exam  Constitutional:       General: He is not in acute distress.     Appearance: Normal appearance. He is not ill-appearing.   HENT:      Head: Normocephalic and atraumatic.      Nose: Nose normal.   Eyes:      Extraocular Movements: Extraocular movements intact.      Conjunctiva/sclera: Conjunctivae normal.      Pupils: Pupils are equal, round, and reactive to light.   Cardiovascular:      Rate and Rhythm: Normal rate.   Pulmonary:      Effort: Pulmonary effort is normal.   Abdominal:      General: There is no distension.   Musculoskeletal:         General: Normal range of motion.      Cervical back: Neck supple.   Neurological:      General: No focal deficit present.      Mental Status: He is alert.      Gait: Gait normal.   Psychiatric:         Mood and Affect: Mood normal.         Behavior: Behavior normal.         Assessment/Plan   Problem List Items Addressed This Visit          Circulatory    Chronic diastolic heart failure (CMS/HCC)    Relevant Medications    isosorbide mononitrate ER (Imdur) 60 mg 24 hr tablet    metoprolol tartrate (Lopressor) 25 mg tablet    Essential hypertension - Primary    Sick sinus syndrome " due to SA node dysfunction (CMS/HCC)    Relevant Medications    isosorbide mononitrate ER (Imdur) 60 mg 24 hr tablet    metoprolol tartrate (Lopressor) 25 mg tablet       Genitourinary    Stage 3a chronic kidney disease       Endocrine/Metabolic    Hypothyroidism    Relevant Medications    levothyroxine (Synthroid, Levoxyl) 50 mcg tablet       Hematologic    Anemia    Relevant Medications    cyanocobalamin (Vitamin B-12) injection 1,000 mcg (Start on 5/9/2023  1:45 PM)    Other Relevant Orders    CBC     Other Visit Diagnoses       Low vitamin B12 level        Relevant Medications    cyanocobalamin (Vitamin B-12) injection 1,000 mcg (Start on 5/9/2023  1:45 PM)        Reviewed and discussed all of the above.    CKD and anemia currently stable.  Iron level has increased.  He is taking iron once weekly.  He has been taking a regular vitamin B12. We discussed changing this to a dissolvable tablet.  We will also give him an injection today.   Swelling is present, but improved.  Erythema is also improving.    Thyroid is stable.    Continue supportive care.    Follow up in 4 months - sooner if any issues.

## 2023-06-21 NOTE — TELEPHONE ENCOUNTER
"Daughter apoorva [phone]  Pt needs note to ask for mail to be \" DELIVERED TO THE DOOR\"  he is not able to go outside to get the mail  Call daughter when done   "

## 2023-08-24 PROBLEM — E78.49 OTHER HYPERLIPIDEMIA: Status: ACTIVE | Noted: 2023-01-01

## 2023-08-24 PROBLEM — E66.3 OVERWEIGHT: Status: ACTIVE | Noted: 2023-01-01

## 2023-08-24 PROBLEM — R07.9 CHEST PAIN: Status: ACTIVE | Noted: 2023-01-01

## 2023-08-24 PROBLEM — D50.8 IRON DEFICIENCY ANEMIA SECONDARY TO INADEQUATE DIETARY IRON INTAKE: Status: ACTIVE | Noted: 2023-01-01

## 2023-08-24 PROBLEM — M67.959 TENDINOPATHY OF GLUTEAL REGION: Status: ACTIVE | Noted: 2023-01-01

## 2023-08-24 PROBLEM — R06.09 DYSPNEA ON EXERTION: Status: ACTIVE | Noted: 2023-01-01

## 2023-08-24 PROBLEM — R53.1 RIGHT SIDED WEAKNESS: Status: ACTIVE | Noted: 2023-01-01

## 2023-08-24 PROBLEM — C44.321 SQUAMOUS CELL CANCER OF SKIN OF ALA NASI: Status: ACTIVE | Noted: 2023-01-01

## 2023-09-05 PROBLEM — M25.662 KNEE JOINT STIFFNESS, BILATERAL: Status: RESOLVED | Noted: 2023-05-08 | Resolved: 2023-01-01

## 2023-09-05 PROBLEM — R26.9 ABNORMAL GAIT: Status: RESOLVED | Noted: 2023-05-08 | Resolved: 2023-01-01

## 2023-09-05 PROBLEM — M25.671 ANKLE JOINT STIFFNESS, BILATERAL: Status: RESOLVED | Noted: 2023-05-08 | Resolved: 2023-01-01

## 2023-09-05 PROBLEM — M54.2 CERVICALGIA: Status: RESOLVED | Noted: 2023-05-08 | Resolved: 2023-01-01

## 2023-09-05 PROBLEM — N25.81 SECONDARY HYPERPARATHYROIDISM OF RENAL ORIGIN (MULTI): Status: ACTIVE | Noted: 2023-01-01

## 2023-09-05 PROBLEM — L03.119 CELLULITIS, LEG: Status: RESOLVED | Noted: 2023-05-08 | Resolved: 2023-01-01

## 2023-09-05 PROBLEM — M79.606 LEG PAIN: Status: RESOLVED | Noted: 2023-05-08 | Resolved: 2023-01-01

## 2023-09-05 PROBLEM — G63 POLYNEUROPATHY ASSOCIATED WITH UNDERLYING DISEASE (MULTI): Status: ACTIVE | Noted: 2023-05-08

## 2023-09-05 PROBLEM — J44.9 CHRONIC OBSTRUCTIVE PULMONARY DISEASE, UNSPECIFIED COPD TYPE (MULTI): Status: ACTIVE | Noted: 2023-01-01

## 2023-09-05 PROBLEM — I69.354 HEMIPLEGIA AND HEMIPARESIS FOLLOWING CEREBRAL INFARCTION AFFECTING LEFT NON-DOMINANT SIDE (MULTI): Status: ACTIVE | Noted: 2023-01-01

## 2023-09-05 PROBLEM — H35.3211 EXUDATIVE AGE-RELATED MACULAR DEGENERATION, RIGHT EYE, WITH ACTIVE CHOROIDAL NEOVASCULARIZATION (MULTI): Status: ACTIVE | Noted: 2023-01-01

## 2023-09-05 PROBLEM — M25.672 ANKLE JOINT STIFFNESS, BILATERAL: Status: RESOLVED | Noted: 2023-05-08 | Resolved: 2023-01-01

## 2023-09-05 PROBLEM — M67.959 TENDINOPATHY OF GLUTEAL REGION: Status: RESOLVED | Noted: 2023-01-01 | Resolved: 2023-01-01

## 2023-09-05 PROBLEM — N18.32 STAGE 3B CHRONIC KIDNEY DISEASE (MULTI): Status: ACTIVE | Noted: 2023-05-08

## 2023-09-05 PROBLEM — M25.661 KNEE JOINT STIFFNESS, BILATERAL: Status: RESOLVED | Noted: 2023-05-08 | Resolved: 2023-01-01

## 2023-09-05 PROBLEM — E11.22 TYPE 2 DIABETES MELLITUS WITH CHRONIC KIDNEY DISEASE, WITHOUT LONG-TERM CURRENT USE OF INSULIN, UNSPECIFIED CKD STAGE (MULTI): Status: ACTIVE | Noted: 2023-01-01

## 2023-09-05 PROBLEM — I63.50 CEREBROVASCULAR ACCIDENT (CVA) DUE TO OCCLUSION OF CEREBRAL ARTERY (MULTI): Status: ACTIVE | Noted: 2023-05-08

## 2023-09-05 NOTE — PROGRESS NOTES
"Subjective   Patient ID: Amari Boyer is a 89 y.o. male who presents for Hypertension.  Hypertension  Pertinent negatives include no chest pain, palpitations or shortness of breath.   Chronic issues that have been stable but as not unexpected also slowly progressive.    Finally agreeable to move into an Assisted Living environment.  He has had progreeive difficulty getting around and even doing ADL's.  Difficulty getting from room to room.  Due to leg weakness a cane is not an option and has not been helpful or safe.  Family is requesting a wheel chair to improve mobility aroudn the house.   Patient agrees that this would be very helpful.  He feels he could propel the wheelchair sufficiently to dining etc.   He has chronic SOB with minimal activited.  He has had a stroke   affecting his left side, but since then he has had difficulty walking/  Due to his limited stamina and LE weakness he is requesting a wheelchair.  Although weak upper extremities he feels he could propel himself short distances.  Family is and caregivers area also available, especially for longer distances.      Review of Systems   HENT:  Positive for ear pain.         Ear blockage   Respiratory:  Negative for cough, shortness of breath and wheezing.    Cardiovascular:  Negative for chest pain and palpitations.   Gastrointestinal:  Negative for abdominal pain, constipation and diarrhea.       Objective   /78 (BP Location: Left arm, Patient Position: Sitting, BP Cuff Size: Adult)   Pulse 100   Temp 36.7 °C (98 °F) (Tympanic)   Resp 14   Ht 1.727 m (5' 8\")   Wt 79.4 kg (175 lb)   SpO2 98%   BMI 26.61 kg/m²     Physical Exam    Assessment/Plan   Problem List Items Addressed This Visit       Unsteady gait    Anemia    Relevant Orders    CBC    Chronic diastolic heart failure (CMS/HCC)    Slow transit constipation    Edema    Elevated blood sugar    Hypothyroidism    Relevant Orders    Thyroid Stimulating Hormone    Paroxysmal atrial " fibrillation (CMS/HCC)    Polyneuropathy associated with underlying disease (CMS/HCC)    Stage 3b chronic kidney disease (CMS/HCC)    Cerebrovascular accident (CVA) due to occlusion of cerebral artery (CMS/HCC)    Weakness    Dyspnea on exertion    Exudative age-related macular degeneration, right eye, with active choroidal neovascularization (CMS/HCC)    Hemiplegia and hemiparesis following cerebral infarction affecting left non-dominant side (CMS/HCC)    Chronic obstructive pulmonary disease, unspecified COPD type (CMS/HCC)    Type 2 diabetes mellitus with chronic kidney disease, without long-term current use of insulin, unspecified CKD stage (CMS/HCC)    Relevant Orders    Comprehensive Metabolic Panel    Hemoglobin A1C    Secondary hyperparathyroidism of renal origin (CMS/HCC)     Other Visit Diagnoses       Low vitamin B12 level    -  Primary    Relevant Medications    cyanocobalamin (Vitamin B-12) injection 1,000 mcg (Completed)    Other Relevant Orders    CBC    Bilateral impacted cerumen        Weakness generalized        Relevant Orders    Referral to Physical Therapy        We discussed all of the above.    We discussed continuing his weekly iron for his anemia.  He was given a B12 shot today and we will continue to monitor his blood counts.    He does have some chronic constipation, but does improve with regular colace and prn miralx.  We will also continue to monitor his renal function and electrolytes.  We will withhold any diuretics today for his BP and swelling until we see his renal function.    We will get PT for his LE weakness.    We will follow his TSH and titrate medications as needed.    Rx written for manual wheelchair.    Both ears were irrigated with tap water and cerumen flushed out.  TM's intact afterwards.  He tolerated procedure well and felt better afterwards.    [facility] environment - weakness, difficulties with ADL's, limited endurance all  making him homebound and requiring/benefiting from AL.    Follow up in 3 months- sooner if any issues.       For wheel chair :  Finally agreeable to move into an Assisted Living environment.  He has had progreeive difficulty getting around and even doing ADL's.  Difficulty getting from room to room.  Due to leg weakness a cane is not an option and has not been helpful or safe.  Family is requesting a wheel chair to improve mobility aroudn the house.   Patient agrees that this would be very helpful.  He feels he could propel the wheelchair sufficiently to dining etc.   He has chronic SOB with minimal activited.  He has had a stroke   affecting his left side, but since then he has had difficulty walking/  Due to his limited stamina and LE weakness he is requesting a wheelchair.  Although weak upper extremities he feels he could propel himself short distances.  Family is and caregivers area also available, especially for longer distances.

## 2023-09-12 NOTE — TELEPHONE ENCOUNTER
Farheen Plata, left  stating they received w/c Rx.  Requesting additional visit notes.  Faxes received not completed.  Form B-F need to be reviewed and addressed/addended.  [phone]

## 2023-09-14 NOTE — TELEPHONE ENCOUNTER
Baldo from [facility]   Please return call regarding a fax they received  [phone]

## 2023-10-03 NOTE — PROGRESS NOTES
Subjective   Amari Boyer is a 89 y.o. white male, followed primarily by Dr. Chavez Clark and from a cardiology standpoint by Dr. Nikko Jarrell, who presents for pacemaker follow-up.  The patient has known coronary disease, status post three-vessel CABG in 1993, with several percutaneous interventions since then.  He has chronic exertional angina and is on nitrate therapy.  He sinus node dysfunction and a marked first-degree AV block.  He underwent [employer] DDDR pacemakers in 2000, 2005, 2013, and most recently by me in November 2020.  He also has paroxysmal atrial fibrillation and took Xarelto therapy in the past but had epistaxis that prompted discontinuation of this therapy.  He suffered a small right hemispheric stroke thereafter, however, and was resumed on anticoagulation with Eliquis at 2.5 mg p.o. twice daily.    The patient moved into [facility] just 2 weeks ago, and is trying to get his remote pacemaker follow-up set up at that institution.  The patient is a retired  and presents today with his son, who is also a retired .    The patient struggles with left lower extremity lymphedema, and also has some chronic anemia and hypothyroidism.    Current Outpatient Medications on File Prior to Visit   Medication Sig    apixaban (Eliquis) 2.5 mg tablet Take 1 tablet (2.5 mg) by mouth 2 times a day.    docusate sodium (Colace) 100 mg capsule Take by mouth.    erythromycin (Romycin) 5 mg/gram (0.5 %) ophthalmic ointment APPLY 1/4 INCH INTO BOTH LOWER LIDS TWICE DAILY    ferrous sulfate 325 (65 Fe) MG tablet Take by mouth.    isosorbide mononitrate ER (Imdur) 60 mg 24 hr tablet Take 1 tablet (60 mg) by mouth 2 times a day.    levothyroxine (Synthroid, Levoxyl) 50 mcg tablet Take 1 tablet (50 mcg) by mouth once daily.    metoprolol tartrate (Lopressor) 50 mg tablet Take 1 tablet by mouth 2 times a day. Take with food.    neomycin-polymyxin  B-dexameth (Polydex) 3.5 mg/g-10,000 unit/g-0.1 % ointment ophthalmic ointment APPLY 1 SMALL AMOUNT TO BOTH LOWER LIDS EVERY EVENING    vit C,P-Hj-alznx-lutein-zeaxan (PreserVision AREDS-2) 250-90-40-1 mg capsule Take by mouth twice a day.    [DISCONTINUED] nitroglycerin (Nitrostat) 0.4 mg SL tablet Place 1 tablet (0.4 mg) under the tongue every 5 minutes if needed for chest pain.    cyanocobalamin (Vitamin B-12) 1,000 mcg tablet Take 100 mcg by mouth once daily.    multivit-iron-minerals-folic acid (Centrum Silver) 0.4 mg-300 mcg- 250 mcg tab Take by mouth.     Objective   General: Elderly gentleman sitting comfortably in a wheelchair  Vitals:    10/03/23 1425   BP: 112/62   Pulse: 65   HEENT: Unremarkable  Neck: No jugular venous distention  Left subclavian pacemaker pocket: Normal in appearance  Lungs: Clear, with no wheezes or rales  Cardiac: Regular rhythm at present, with grade 1/6 functional flow murmur along left sternal border with normal S2  Abdomen: No organomegaly or masses  Extremities: Trace edema in right foot and 2+ edema below left knee (chronic)  Neurologic, no focal deficits noted    Pacemaker Check:   The [employer] pacemaker was checked today.  The unit is programmed DDIR between 65 bpm 120 bpm.  The patient has 76% atrial pacing and 75% ventricular pacing.  The lead parameters were good.  There is no atrial fibrillation burden noted.  The battery longevity is estimated at 3.5 years.    Impressions:  1.  Coronary artery disease, status post remote CABG and percutaneous interventions, followed by Dr. Nikko Jarrell.  2.  Sinus node dysfunction, with marked first-degree AV block.  3.  Status post [employer] DDDR pacemakers in 2000, 2005, 2013, and 2020.  The pacemaker function is normal today.  4.  Paroxysmal atrial fibrillation, with low burden of this rhythm.  The patient has a [id number] score of at least 5 (CAD, 2 points for age over 75, and 2 points for prior stroke), and he is  appropriately anticoagulated with low-dose Eliquis.  5.  Chronic lower extremity lymphedema, affecting left leg much more so than right leg.  6.  Functional disabilities, now living in [facility] in independent living.  7.  Other medical problems, including hypothyroidism, hyperlipidemia, degenerative joint and spine disease, prior small right hemispheric stroke, and skin cancers.    Recommendations:  1.  The patient will follow-up with me on an annual basis for pacemaker checks.  He states that he is not optimistic that he will live another 3.5 years.  2.  His pacemaker will be checked each quarter remotely in the interim, if this can be properly set up at his independent living facility.    Amari Lowry MD

## 2023-11-07 NOTE — PROGRESS NOTES
"Subjective   Patient ID: Amari Boyer is a 89 y.o. male who presents for FUV    HPI   Patient was seen here today for follow-up on lymphedema of left lower extremity. Patient is using the pump every other day or occasionally because he is not able to put them on by himself. He is mostly sedentary and sits for extended periods.  The patient denies any history of infection or inflammation, no constitutional symptoms, no pain, no fever, night sweats. Patient notes he is urinating very frequently after the sessions which can become troublesome.   His swelling is coming back again because of infrequent use of the ambulatory home pumps.     Review of Systems   Constitutional: Negative.    HENT: Negative.     Eyes: Negative.    Respiratory: Negative.     Cardiovascular:  Positive for leg swelling.   Gastrointestinal: Negative.    Endocrine: Negative.    Musculoskeletal: Negative.    Skin: Negative.    Neurological: Negative.    Hematological: Negative.    Psychiatric/Behavioral: Negative.     All other systems reviewed and are negative.      Objective   /68 (BP Location: Right arm, Patient Position: Sitting)   Pulse 65   Ht 1.753 m (5' 9\")   Wt 82.1 kg (181 lb)   SpO2 99%   BMI 26.73 kg/m²     Physical Exam  Vitals reviewed.   Constitutional:       Appearance: Normal appearance.   HENT:      Head: Normocephalic and atraumatic.   Cardiovascular:      Rate and Rhythm: Normal rate and regular rhythm.      Pulses: Normal pulses.      Heart sounds: Normal heart sounds.   Pulmonary:      Effort: Pulmonary effort is normal.      Breath sounds: Normal breath sounds.   Musculoskeletal:         General: Normal range of motion.   Neurological:      General: No focal deficit present.      Mental Status: He is alert and oriented to person, place, and time.   Psychiatric:         Mood and Affect: Mood normal.         Behavior: Behavior normal.         Assessment/Plan   There are no diagnoses linked to this " encounter.     89 year old male here for left lower extremity lymphedema. His swelling is coming back again because of infrequent use of the ambulatory home pumps.     1- Patient will benefit from lymphedema PT sessions for the coming 2 weeks and will use the ambulatory home pumps daily.   2- Will apply for compression garment wraps.  3- Skin care instructions given-apply lotion daily to affected skin.   4- Follow up in 2-3 weeks for re-assessment.    Scribe Attestation  By signing my name below, IMagali Scribe   attest that this documentation has been prepared under the direction and in the presence of Hubert Whitley MD.

## 2023-11-14 PROBLEM — I83.891 VARICOSE VEINS OF RIGHT LOWER EXTREMITY WITH COMPLICATIONS: Status: RESOLVED | Noted: 2023-05-08 | Resolved: 2023-01-01

## 2023-11-14 PROBLEM — I89.0 LYMPHEDEMA, LIMB: Status: RESOLVED | Noted: 2023-05-08 | Resolved: 2023-01-01

## 2023-11-14 PROBLEM — E78.49 OTHER HYPERLIPIDEMIA: Status: RESOLVED | Noted: 2023-01-01 | Resolved: 2023-01-01

## 2023-11-14 NOTE — PROGRESS NOTES
Chief Complaint:   No chief complaint on file.     History Of Present Illness:    Amari Boyer is a 89 y.o. male with a history of CAD s/p CABG (3 vessel in the past with LIMA-LAD, VG-OM, and VG-RCA) with known occluded VG-RCA, dyslipidemia, hypertension, lymphedema, sick sinus syndrome status post pacer in situ, chronic diastolic heart failure, and atrial fibrillation here for routine follow-up.     Still experiencing chest pain nearly every day.  Again it seems to be provoked by passing his bowels.  He will use nitroglycerin sublingual which seems to help.    His right leg swelling has almost completely subsided.  He still has left leg swelling.  Will be going to lymphedema clinic shortly.     Echocardiogram 7/10/2021: Mildly depressed LV function. Grade 1 diastolic dysfunction. Aortic valve sclerosis with mild AR.     Echocardiogram 1/9/19 demonstrated normal LV size and function, EF 55-60%. Normal RV size and function. Mild MR, TR, and WI. Trivial pericardial effusion with evidence of a pericardial fat pad and no evidence of tamponade physiology.     PCI of the VG-OM 7/10/17. Catheterization performed for unstable angina. VG was stented with a Resolute 2.75 x 18 mm ETHAN in the proximal VG and a Resolute 2.5 x 12 mm ETHAN to the mid VG.  Of note LIMA to LAD was patent and vein graft to RCA was occluded.     Carotid duplex 5/26/17 demonstrating less than 50% stenosis bilaterally.     Lexiscan nuclear stress test December 2016 demonstrating no evidence of ischemia or scar.     Echo 9/18/15 with EF 50-55%. Abnormal septal motion consistent with postthoracotomy state. 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, subcortical (CMS/HCC), Other injury of unspecified body region, initial encounter, Peripheral vascular angioplasty status, Personal history of other diseases of the circulatory system (03/05/2015), Personal history of other diseases of the circulatory system (12/03/2015), Personal history of other diseases of the circulatory system (04/26/2016), Personal history of other endocrine, nutritional and metabolic disease (09/10/2019), Personal history of other malignant neoplasm of skin, Personal history of other medical treatment, Personal history of other medical treatment, Personal history of other specified conditions (07/27/2021), and Personal history of pneumonia (recurrent).    Past Surgical History:  He has a past surgical history that includes Cataract extraction (11/07/2014); Other surgical history (07/27/2022); Other surgical history (07/27/2022); Other surgical history (03/22/2021); Other surgical history (03/22/2021); Other surgical history (03/22/2021); Other surgical history (03/22/2021); Other surgical history (03/22/2021); Other surgical history (03/22/2021); Other surgical history (03/22/2021); Other surgical history (03/22/2021); Other surgical history (03/22/2021); Other surgical history (03/22/2021); Other surgical history (03/22/2021); Other surgical history (03/22/2021); Other surgical history (03/22/2021); Other surgical history (03/22/2021); Coronary artery bypass graft (04/26/2016); Other surgical history (04/26/2016); CT angio head w and wo IV contrast (7/9/2021); and CT angio neck (7/9/2021).      Social History:  He reports that he quit smoking about 50 years ago. His smoking use included cigarettes. He has never used smokeless tobacco. He reports that he does not currently use alcohol. He reports that he does not currently use drugs.    Family History:  Family History   Problem Relation Name Age of  "Onset    Coronary artery disease Mother      Heart attack Mother      Other (ICD in place) Mother      Coronary artery disease Father      Heart attack Father      Other (ICD in place) Father      Coronary artery disease Sister      Coronary artery disease Brother      Heart attack Brother          Allergies:  Atorvastatin, Rosuvastatin, and Simvastatin    Outpatient Medications:  Current Outpatient Medications   Medication Instructions    apixaban (Eliquis) 2.5 mg tablet 1 tablet, oral, 2 times daily    docusate sodium (Colace) 100 mg capsule oral    erythromycin (Romycin) 5 mg/gram (0.5 %) ophthalmic ointment APPLY 1/4 INCH INTO BOTH LOWER LIDS TWICE DAILY    ferrous sulfate 325 (65 Fe) MG tablet oral    isosorbide mononitrate ER (Imdur) 60 mg 24 hr tablet 1 tablet, oral, 2 times daily    levothyroxine (SYNTHROID, LEVOXYL) 50 mcg, oral, Daily    metoprolol tartrate (LOPRESSOR) 50 mg, oral, 2 times daily, Take with food.    neomycin-polymyxin B-dexameth (Polydex) 3.5 mg/g-10,000 unit/g-0.1 % ointment ophthalmic ointment APPLY 1 SMALL AMOUNT TO BOTH LOWER LIDS EVERY EVENING    nitroglycerin (NITROSTAT) 0.4 mg, sublingual, Every 5 min PRN    vit C,Q-Qa-xobeb-lutein-zeaxan (PreserVision AREDS-2) 250-90-40-1 mg capsule oral, 2 times daily       Last Recorded Vitals:  Visit Vitals  /82 (BP Location: Left arm, Patient Position: Sitting)   Pulse 60   Ht 1.753 m (5' 9\")   Wt 81.6 kg (180 lb)   SpO2 96%   BMI 26.58 kg/m²   Smoking Status Former   BSA 1.99 m²      LASTWT(3):   Wt Readings from Last 3 Encounters:   11/14/23 81.6 kg (180 lb)   11/08/23 82.1 kg (181 lb)   10/03/23 81.6 kg (180 lb)       Physical Exam:  HEENT: JVP is normal.   Pulmonary: Clear to auscultation bilaterally.  Cardiovascular: S1,S2, regular. No appreciable murmurs, rubs or gallops.   Lower extremities: Warm. Trace distal pulses.  No significant edema on the right and 1+ on the left      Last Labs:  CBC -  Recent Labs     04/27/23  1408 " 09/30/22  1409 08/30/22  1424   WBC 5.0 4.9 5.8   HGB 10.0* 9.7* 10.4*   HCT 32.4* 32.0* 34.0*    166 198   * 105* 103*       CMP -  Recent Labs     04/27/23  1408 09/30/22  1409 08/30/22  1424 07/11/21  0504 07/10/21  0514 07/09/21  1225 07/02/21  0517 09/08/20  0610 09/07/20  1054    141 140   < > 139   < > 141   < > 138   K 4.7 4.6 4.6   < > 3.9   < > 4.1   < > 4.6   * 109* 107   < > 108*   < > 110*   < > 108*   CO2 26 24 25   < > 23   < > 22   < > 20*   ANIONGAP 11 13 13   < > 12   < > 13   < > 15   BUN 28* 33* 33*   < > 28*   < > 54*   < > 19   CREATININE 1.84* 1.71* 1.95*   < > 1.62*   < > 1.52*   < > 1.39*   MG  --   --   --   --  2.10  --  1.80  --  1.73    < > = values in this interval not displayed.     Recent Labs     04/27/23  1408 05/02/22  1511 07/10/21  0514 07/09/21  1225 07/02/21  0517 07/01/21  2058   ALBUMIN 3.8 3.8 3.5 3.6   < > 3.6   ALKPHOS 78  --   --  82  --  78   ALT 16  --   --  8*  --  9*   AST 22  --   --  15  --  15   BILITOT 0.4  --   --  0.4  --  0.5    < > = values in this interval not displayed.       LIPID PANEL -   Recent Labs     04/27/23  1408 07/10/21  0514 11/30/20  1012   CHOL 138 137 153   LDLF 67 79 95   HDL 51.3 34.0* 42.0   TRIG 101 122 82       Recent Labs     04/27/23  1408 09/30/22  1409 06/29/22  1356 07/10/21  0514 07/09/21  1225   BNP  --   --   --   --  775*   HGBA1C 6.0* 5.8* 5.9*   < >  --     < > = values in this interval not displayed.           Assessment/Plan   1) chronic stable angina: He would like to continue with his current regimen for now.  If his episodes of chest pain worsen then we can increase his isosorbide mononitrate at least in the morning.  We could consider using either 90 mg or 120 mg in the morning and 60 mg in the evening.  Once again he would like to avoid invasive procedures if at all possible.     2) CAD: CABG 1993 with LIMA-LAD, VG-OM and VG-RCA. Known occluded VG-RCA. PCI of the VG-OM 7/10/17.  As noted above  the patient would like to avoid invasive strategy.  We will continue with medical therapy.     3) dyslipidemia: Has had several side effects with several statins. LDL controlled.     4) atrial fibrillation: Elevated CHADS-VASc score. Continue Eliquis (renally dosed)     5) pacemaker in situ: Continue to follow with Dr. Lowry     6) chronic anemia: Hemoglobin stable.     7) lymphedema: Continue with vascular medicine.      8) follow-up: 3 mo or sooner if needed.       Nikko Jarrell MD

## 2023-12-12 NOTE — PROGRESS NOTES
Occupational Therapy    Occupational Therapy Evaluation    Name: Amari Brower  MRN: [id number]  : 10/3/1930  Date: 2023  Visit #1     Time in 1430  Time out 1525  Total time 55 minutes  Evaluation moderate 45 minutes  Treatment self care 10 minutes    Assessment:  Amari Brower presents to occupational therapy with chief complaint of BLE lymphedema, joint stiffness, skin texture changes and generalized weakness all impacting independence with ADLs, IADLs, functional mobility, and leisure tasks.   Standardized testing and measures administered today reveal that the patient has multiple impairments in body structures and functions, activity limitations, and participation restrictions.   The patient has personal factors and comorbidities that may serve as barriers affecting the plan of care, including h/o CVA, weakness.   Skilled OT services are warranted in order to realize measurable change in the above outcome measures and achieve improvements in patient’s functional status and individual goals. Pt and his son verbalized understanding and is in agreement with goals and plan of care.       Clinical Presentation: evolving with changing characteristics   Level of Complexity: moderate   Problems To Be Addressed: decreased ADL performance, decreased IADL performance, decreased play/leisure performance, decreased work, decreased rest/sleep, decreased functional transfers and mobility, decreased knowledge of precautions, decreased knowledge of HEP, pain, decreased ROM/joint mobility, decreased strength and lymphedema.      Plan:  By discharge AMARI BROWER will achieve the following goals:     -Demonstrate decreased swelling and softened tissue texture in BLE upon visual inspection and palpation to increase level of independence with functional mobility and ADLS.  -Decrease circumferential measurements in BLEs by.5-3.0cm  -Demonstrate increased knowledge with lymphedema precautions to reduce the risk of  infection and further exacerbation.  -Demonstrate independence with the self manual lymph drainage to enhance lymphatic flow and decongestion of BLEs. Upgrade pt home management with lymphedema pump.  -Demonstrate independence with self bandaging/compression techniques and independent understanding of the principles and theory of lymphatic compression.  -Be fit with and demonstrate good tolerance to BLE compression garment when the patient is stable, at maximal decongestion.  -Demonstrate independence with donning/doffing garment and adherence to wear schedule.  -Improve Lower Extremity Functional Scale by 10-21 points by discharge.  -Decrease pain, tightness.  -Improve bilateral LE functional ROM and strength as needed for safe functional mobility.  -Demonstrate independence with home exercise program and compliance with lymphedema exercise precautions.     Intervention plan include: vasopneumatic device , ADLs, compression bandaging , education/instruction , garment measuring/fitting , home program , IADLs, manual lymphatic drainage , manual therapy , functional mobility, therapeutic activities and therapeutic exercises.   Frequency and duration: 1-2 time(s) a week, for 8-12 weeks.        Subjective   Current Problem:  1. Stiffness of both ankle joints        2. Lymphedema of both lower extremities  Referral to Physical Therapy      3. Skin texture changes        4. Weakness            General:   History of present illness. Patient arrive to OT with his son Nikko, with chief complaint of BLE lymphedema, joint stiffness, skin texture changes and generalized weakness all impacting independence with ADLs, IADLs, functional mobility, and leisure tasks.   Patient reports swelling in BLEs for several years, worsened with episode of cellulitis infection .   Pt referred by Dr Whitley 11/8/23 for worsening lymphedema. Pt recent swelling, on RLE.   Pt currently using lymphedema pumps. Pt has difficulty with compression  stockings, has interest in alternate compression such as velcro garments. Will assess.  Additional PMH: CVA 6/2021 causing residual L sided weakness, CAD, CKD, cardiac pacemaker, HTN    Pt lives in an assisted living apartment. Pt walks with w walker short distances, uses wheelchair for distances. Pt does not use stairs. Pt has family who are supportive.  Pt has assist as needed for bathing and dressing.   Pt meals provided by facility, pt assisted to dining room.Patient ambulates with use of wheelchair while in the community and Leg swelling and residual left side weakness impacts pt function.  .   Medical Screening:   Medical screening assessed   No overt signs of domestic/child or elder abuse .           Pain  Leg pain, heaviness, tightness     Precautions:  Precautions  STEADI Fall Risk Score (The score of 4 or more indicates an increased risk of falling): 8  H/o cellulitis, h/o CVA, left side weakness: Pacemaker left chest       Objective LE:       ROM: joint stiffness       Strength, left side weakness due to CVA; generalized weakness.       Sensation intact  Lower Extremity (Skin Appearance/Condition and Girth):   Dryness    Fibrotic edema    Pitting edema   Hemosiderin staining    Hyperpigmentation, red/purple/pink color   + Stemmer Sign    Additional Information: Full and firm tissue texture B lower legs   -feet involved   LLE lymphedema > RLE   LLE with purplish pigmentation   Redness and dry skin patch at RLE (sight of cellulitis previously; skin is not warm to the touch).  Pt reports swelling improves in AM, pt sleeps in bed with legs up.       LE girth measurements   R Superior border of patella (SBP) 43.4  R 10cm above SBP -   R 10cm below SBP 38.8  R 20cm below SBP 38.6  R 30cm below SBP 32.3   R 35cm below SBP 29.1   R Ankle lobule 30.5  R Ankle 27.0  R Forefoot 26.8    L Superior border of patella (SBP) 44.6  L10cm above SBP -  L 10cm below SBP 39.2  L 20cm below SBP 38.1  L 30cm below SBP 37.0  L  35cm below SBP 35.0  L Ankle lobule 38.5  L Ankle 36.2  L Forefoot  30.5       Treatment:  Self Care 10  OT educated/reviewed anatomy / physiology of the lymphatic system. OT educated pt on complete decongestive therapy (CDT) for lymphedema treatment. OT developed goals and plan of care with patient.   OT fit pt with new lower leg tg  (size G), instruct fit, avoid doubling, rolling.  OT review compression options, pt voiced interest in velcro garments, OT show pt and his son samples.  Will assess.        Outcome Measures:  LLIS= 46 with one infection

## 2023-12-13 NOTE — PROGRESS NOTES
"Subjective   Patient ID: Amari Boyer is a 89 y.o. male who presents for Hypothyroidism.    Continues to struggle with weakness.  No issues with dizzy spells.  Np HA's and no CP.    [place] in [place] now and just started going to lymph edema clinic there.      Review of Systems   Constitutional:  Positive for unexpected weight change.   HENT:  Positive for rhinorrhea.    Respiratory:  Negative for cough, shortness of breath and wheezing.    Cardiovascular:  Positive for leg swelling. Negative for chest pain and palpitations.   Gastrointestinal:  Negative for blood in stool.       Objective   /80 (BP Location: Right arm, Patient Position: Sitting, BP Cuff Size: Adult)   Pulse 64   Temp 36.4 °C (97.6 °F) (Tympanic)   Resp 14   Ht 1.753 m (5' 9\")   Wt 85.3 kg (188 lb)   SpO2 96%   BMI 27.76 kg/m²     Physical Exam  Vitals reviewed.   Constitutional:       Appearance: Normal appearance.   HENT:      Head: Normocephalic.   Cardiovascular:      Rate and Rhythm: Normal rate.   Pulmonary:      Effort: Pulmonary effort is normal.   Musculoskeletal:         General: Swelling present. Normal range of motion.   Neurological:      General: No focal deficit present.      Mental Status: He is alert.   Psychiatric:         Mood and Affect: Mood normal.         Assessment/Plan   Problem List Items Addressed This Visit             ICD-10-CM    Edema - Primary R60.9    Relevant Medications    furosemide (Lasix) 20 mg tablet    Essential hypertension I10    Relevant Orders    Basic Metabolic Panel    Acquired hypothyroidism E03.9    Relevant Medications    levothyroxine (Synthroid, Levoxyl) 75 mcg tablet    Left-sided weakness R53.1   We reviewed and discussed all of the above.    Chronic edema.  In lymphedema clinic.  His BP is up today.  We will initiate furosemide on prn basis.  We discussed using on ly prn to prevent any renal injury.    We discussed thyroid and both generalized weakness in addition to weakness post " stroke.    We will recheck renal function and TSH in 2 months - follow up here in 4 months - sooner if any issues.

## 2023-12-17 PROBLEM — M25.672 STIFFNESS OF BOTH ANKLE JOINTS: Status: ACTIVE | Noted: 2023-01-01

## 2023-12-17 PROBLEM — M25.671 STIFFNESS OF BOTH ANKLE JOINTS: Status: ACTIVE | Noted: 2023-01-01

## 2023-12-21 NOTE — TELEPHONE ENCOUNTER
Nikko, son of Amari Boyer, stopped at the office with a question on a prescription that was recently given to his dad.  There may be some confusion on the directions.    The new med is a diuretic that was to be taken  initially for three days and then??????  That is where the confusion begins.    Nikko would like a phone call or text to clarify directions and how proceed.    The best phone number for him to be reached is [phone]    He will be in the operating room all day tomorrow and he should be available after 3:00 or a message can be left.     He is also going to attempt [employer] connection so that may be an option as well.    Thank you

## 2024-01-01 ENCOUNTER — APPOINTMENT (OUTPATIENT)
Dept: CARDIOLOGY | Facility: HOSPITAL | Age: 89
DRG: 193 | End: 2024-01-01
Payer: MEDICARE

## 2024-01-01 ENCOUNTER — NURSING HOME VISIT (OUTPATIENT)
Dept: POST ACUTE CARE | Facility: EXTERNAL LOCATION | Age: 89
End: 2024-01-01

## 2024-01-01 ENCOUNTER — HOSPITAL ENCOUNTER (OUTPATIENT)
Dept: CARDIOLOGY | Facility: HOSPITAL | Age: 89
Discharge: HOME | End: 2024-01-04
Payer: MEDICARE

## 2024-01-01 ENCOUNTER — OFFICE VISIT (OUTPATIENT)
Dept: OTOLARYNGOLOGY | Facility: CLINIC | Age: 89
End: 2024-01-01
Payer: MEDICARE

## 2024-01-01 ENCOUNTER — TREATMENT (OUTPATIENT)
Dept: OCCUPATIONAL THERAPY | Facility: CLINIC | Age: 89
End: 2024-01-01
Payer: MEDICARE

## 2024-01-01 ENCOUNTER — NURSING HOME VISIT (OUTPATIENT)
Dept: POST ACUTE CARE | Facility: EXTERNAL LOCATION | Age: 89
End: 2024-01-01
Payer: MEDICARE

## 2024-01-01 ENCOUNTER — APPOINTMENT (OUTPATIENT)
Dept: PRIMARY CARE | Facility: CLINIC | Age: 89
End: 2024-01-01
Payer: MEDICARE

## 2024-01-01 ENCOUNTER — HOSPITAL ENCOUNTER (OUTPATIENT)
Dept: CARDIOLOGY | Facility: HOSPITAL | Age: 89
Discharge: HOME | End: 2024-05-20
Payer: MEDICARE

## 2024-01-01 ENCOUNTER — APPOINTMENT (OUTPATIENT)
Dept: RADIOLOGY | Facility: HOSPITAL | Age: 89
DRG: 193 | End: 2024-01-01
Payer: MEDICARE

## 2024-01-01 ENCOUNTER — DOCUMENTATION (OUTPATIENT)
Dept: OCCUPATIONAL THERAPY | Facility: CLINIC | Age: 89
End: 2024-01-01
Payer: MEDICARE

## 2024-01-01 ENCOUNTER — TELEPHONE (OUTPATIENT)
Dept: PRIMARY CARE | Facility: CLINIC | Age: 89
End: 2024-01-01
Payer: MEDICARE

## 2024-01-01 ENCOUNTER — OFFICE VISIT (OUTPATIENT)
Dept: CARDIOLOGY | Facility: CLINIC | Age: 89
End: 2024-01-01
Payer: MEDICARE

## 2024-01-01 ENCOUNTER — HOSPITAL ENCOUNTER (INPATIENT)
Facility: HOSPITAL | Age: 89
LOS: 5 days | Discharge: NURSING, REHAB OR LONG-TERM CARE | DRG: 193 | End: 2024-03-22
Attending: STUDENT IN AN ORGANIZED HEALTH CARE EDUCATION/TRAINING PROGRAM | Admitting: INTERNAL MEDICINE
Payer: MEDICARE

## 2024-01-01 ENCOUNTER — TELEPHONE (OUTPATIENT)
Dept: PEDIATRICS | Facility: CLINIC | Age: 89
End: 2024-01-01

## 2024-01-01 VITALS
HEIGHT: 69 IN | HEART RATE: 65 BPM | BODY MASS INDEX: 26.96 KG/M2 | DIASTOLIC BLOOD PRESSURE: 80 MMHG | WEIGHT: 182 LBS | OXYGEN SATURATION: 96 % | SYSTOLIC BLOOD PRESSURE: 132 MMHG

## 2024-01-01 VITALS
OXYGEN SATURATION: 96 % | DIASTOLIC BLOOD PRESSURE: 75 MMHG | HEART RATE: 65 BPM | SYSTOLIC BLOOD PRESSURE: 156 MMHG | RESPIRATION RATE: 16 BRPM | TEMPERATURE: 96.8 F | WEIGHT: 169.75 LBS | BODY MASS INDEX: 25.14 KG/M2 | HEIGHT: 69 IN

## 2024-01-01 DIAGNOSIS — F32.1 CURRENT MODERATE EPISODE OF MAJOR DEPRESSIVE DISORDER WITHOUT PRIOR EPISODE (MULTI): ICD-10-CM

## 2024-01-01 DIAGNOSIS — I25.118 CORONARY ARTERY DISEASE OF NATIVE ARTERY OF NATIVE HEART WITH STABLE ANGINA PECTORIS (CMS-HCC): Primary | ICD-10-CM

## 2024-01-01 DIAGNOSIS — I50.9 HEART FAILURE, UNSPECIFIED HF CHRONICITY, UNSPECIFIED HEART FAILURE TYPE (MULTI): ICD-10-CM

## 2024-01-01 DIAGNOSIS — R23.4 SKIN TEXTURE CHANGES: ICD-10-CM

## 2024-01-01 DIAGNOSIS — Z71.89 GOALS OF CARE, COUNSELING/DISCUSSION: ICD-10-CM

## 2024-01-01 DIAGNOSIS — R06.09 DYSPNEA ON EXERTION: ICD-10-CM

## 2024-01-01 DIAGNOSIS — J18.9 PNEUMONIA OF BOTH LOWER LOBES DUE TO INFECTIOUS ORGANISM: ICD-10-CM

## 2024-01-01 DIAGNOSIS — I50.30 DIASTOLIC HEART FAILURE, UNSPECIFIED HF CHRONICITY (MULTI): ICD-10-CM

## 2024-01-01 DIAGNOSIS — R60.9 EDEMA, UNSPECIFIED TYPE: ICD-10-CM

## 2024-01-01 DIAGNOSIS — Z95.1 S/P CABG X 3: Primary | ICD-10-CM

## 2024-01-01 DIAGNOSIS — I48.0 PAROXYSMAL ATRIAL FIBRILLATION (MULTI): ICD-10-CM

## 2024-01-01 DIAGNOSIS — I89.0 LYMPHEDEMA OF BOTH LOWER EXTREMITIES: ICD-10-CM

## 2024-01-01 DIAGNOSIS — H35.3211 EXUDATIVE AGE-RELATED MACULAR DEGENERATION, RIGHT EYE, WITH ACTIVE CHOROIDAL NEOVASCULARIZATION (MULTI): ICD-10-CM

## 2024-01-01 DIAGNOSIS — B34.9 VIRAL SYNDROME: Primary | ICD-10-CM

## 2024-01-01 DIAGNOSIS — N40.1 BENIGN PROSTATIC HYPERPLASIA WITH URINARY HESITANCY: Primary | ICD-10-CM

## 2024-01-01 DIAGNOSIS — K13.70 MOUTH LESION: ICD-10-CM

## 2024-01-01 DIAGNOSIS — R53.1 WEAKNESS: ICD-10-CM

## 2024-01-01 DIAGNOSIS — I48.0 PAROXYSMAL ATRIAL FIBRILLATION (MULTI): Primary | ICD-10-CM

## 2024-01-01 DIAGNOSIS — G93.41 ACUTE METABOLIC ENCEPHALOPATHY: ICD-10-CM

## 2024-01-01 DIAGNOSIS — R35.0 FREQUENCY OF URINATION: ICD-10-CM

## 2024-01-01 DIAGNOSIS — R06.09 DYSPNEA ON EXERTION: Primary | ICD-10-CM

## 2024-01-01 DIAGNOSIS — I10 ESSENTIAL HYPERTENSION: ICD-10-CM

## 2024-01-01 DIAGNOSIS — E78.5 DYSLIPIDEMIA: ICD-10-CM

## 2024-01-01 DIAGNOSIS — N18.4 CKD (CHRONIC KIDNEY DISEASE) STAGE 4, GFR 15-29 ML/MIN (MULTI): Primary | ICD-10-CM

## 2024-01-01 DIAGNOSIS — N18.32 STAGE 3B CHRONIC KIDNEY DISEASE (MULTI): ICD-10-CM

## 2024-01-01 DIAGNOSIS — M25.671 STIFFNESS OF BOTH ANKLE JOINTS: ICD-10-CM

## 2024-01-01 DIAGNOSIS — N18.4 CKD (CHRONIC KIDNEY DISEASE) STAGE 4, GFR 15-29 ML/MIN (MULTI): ICD-10-CM

## 2024-01-01 DIAGNOSIS — I25.10: ICD-10-CM

## 2024-01-01 DIAGNOSIS — E11.22 TYPE 2 DIABETES MELLITUS WITH CHRONIC KIDNEY DISEASE, WITHOUT LONG-TERM CURRENT USE OF INSULIN, UNSPECIFIED CKD STAGE (MULTI): ICD-10-CM

## 2024-01-01 DIAGNOSIS — Z95.0 PACEMAKER: ICD-10-CM

## 2024-01-01 DIAGNOSIS — B34.9 VIRAL SYNDROME: ICD-10-CM

## 2024-01-01 DIAGNOSIS — N17.9 AKI (ACUTE KIDNEY INJURY) (CMS-HCC): ICD-10-CM

## 2024-01-01 DIAGNOSIS — H10.33 ACUTE CONJUNCTIVITIS OF BOTH EYES, UNSPECIFIED ACUTE CONJUNCTIVITIS TYPE: ICD-10-CM

## 2024-01-01 DIAGNOSIS — M25.671 STIFFNESS OF BOTH ANKLE JOINTS: Primary | ICD-10-CM

## 2024-01-01 DIAGNOSIS — K59.1 FUNCTIONAL DIARRHEA: ICD-10-CM

## 2024-01-01 DIAGNOSIS — R19.7 DIARRHEA OF PRESUMED INFECTIOUS ORIGIN: ICD-10-CM

## 2024-01-01 DIAGNOSIS — Z51.5 ENCOUNTER FOR HOSPICE CARE: Primary | ICD-10-CM

## 2024-01-01 DIAGNOSIS — R26.81 UNSTEADY GAIT: ICD-10-CM

## 2024-01-01 DIAGNOSIS — R39.11 BENIGN PROSTATIC HYPERPLASIA WITH URINARY HESITANCY: Primary | ICD-10-CM

## 2024-01-01 DIAGNOSIS — M25.672 STIFFNESS OF BOTH ANKLE JOINTS: Primary | ICD-10-CM

## 2024-01-01 DIAGNOSIS — I25.118 CORONARY ARTERY DISEASE OF NATIVE ARTERY OF NATIVE HEART WITH STABLE ANGINA PECTORIS (CMS-HCC): ICD-10-CM

## 2024-01-01 DIAGNOSIS — I10 ESSENTIAL HYPERTENSION: Primary | ICD-10-CM

## 2024-01-01 DIAGNOSIS — I63.50 CEREBROVASCULAR ACCIDENT (CVA) DUE TO OCCLUSION OF CEREBRAL ARTERY (MULTI): ICD-10-CM

## 2024-01-01 DIAGNOSIS — M27.0 TORUS PALATINUS: Primary | ICD-10-CM

## 2024-01-01 DIAGNOSIS — I50.32 CHRONIC DIASTOLIC HEART FAILURE (MULTI): ICD-10-CM

## 2024-01-01 DIAGNOSIS — J44.9 CHRONIC OBSTRUCTIVE PULMONARY DISEASE, UNSPECIFIED COPD TYPE (MULTI): ICD-10-CM

## 2024-01-01 DIAGNOSIS — G45.9 TIA (TRANSIENT ISCHEMIC ATTACK): ICD-10-CM

## 2024-01-01 DIAGNOSIS — N30.90 CYSTITIS: ICD-10-CM

## 2024-01-01 DIAGNOSIS — I50.30: ICD-10-CM

## 2024-01-01 DIAGNOSIS — R53.1 WEAKNESS: Primary | ICD-10-CM

## 2024-01-01 DIAGNOSIS — I50.32 CHRONIC DIASTOLIC HEART FAILURE (MULTI): Primary | ICD-10-CM

## 2024-01-01 DIAGNOSIS — M25.672 STIFFNESS OF BOTH ANKLE JOINTS: ICD-10-CM

## 2024-01-01 DIAGNOSIS — M25.561 ACUTE PAIN OF RIGHT KNEE: Primary | ICD-10-CM

## 2024-01-01 DIAGNOSIS — R13.19 OTHER DYSPHAGIA: ICD-10-CM

## 2024-01-01 DIAGNOSIS — E87.8 HYPERCHLOREMIA: ICD-10-CM

## 2024-01-01 DIAGNOSIS — K59.01 SLOW TRANSIT CONSTIPATION: ICD-10-CM

## 2024-01-01 DIAGNOSIS — R23.4 SKIN TEXTURE CHANGES: Primary | ICD-10-CM

## 2024-01-01 DIAGNOSIS — R13.10 DYSPHAGIA, UNSPECIFIED TYPE: ICD-10-CM

## 2024-01-01 DIAGNOSIS — F41.9 ANXIETY: ICD-10-CM

## 2024-01-01 DIAGNOSIS — I49.5 SICK SINUS SYNDROME (MULTI): ICD-10-CM

## 2024-01-01 DIAGNOSIS — R29.3 ABNORMAL POSTURE: ICD-10-CM

## 2024-01-01 LAB
ALBUMIN SERPL BCP-MCNC: 2.8 G/DL (ref 3.4–5)
ALBUMIN SERPL BCP-MCNC: 2.9 G/DL (ref 3.4–5)
ALBUMIN SERPL BCP-MCNC: 2.9 G/DL (ref 3.4–5)
ALBUMIN SERPL BCP-MCNC: 3 G/DL (ref 3.4–5)
ALBUMIN SERPL BCP-MCNC: 3.1 G/DL (ref 3.4–5)
ALBUMIN SERPL BCP-MCNC: 3.4 G/DL (ref 3.4–5)
ALP SERPL-CCNC: 97 U/L (ref 33–136)
ALT SERPL W P-5'-P-CCNC: 27 U/L (ref 10–52)
ANION GAP SERPL CALC-SCNC: 10 MMOL/L (ref 10–20)
ANION GAP SERPL CALC-SCNC: 12 MMOL/L (ref 10–20)
ANION GAP SERPL CALC-SCNC: 13 MMOL/L (ref 10–20)
ANION GAP SERPL CALC-SCNC: 13 MMOL/L (ref 10–20)
AST SERPL W P-5'-P-CCNC: 19 U/L (ref 9–39)
ATRIAL RATE: 277 BPM
ATRIAL RATE: 394 BPM
BACTERIA BLD CULT: NORMAL
BACTERIA BLD CULT: NORMAL
BACTERIA FLD CULT: NORMAL
BASOPHILS NFR FLD MANUAL: 0 %
BILIRUB SERPL-MCNC: 0.7 MG/DL (ref 0–1.2)
BLASTS NFR FLD MANUAL: 0 %
BNP SERPL-MCNC: 1202 PG/ML (ref 0–99)
BUN SERPL-MCNC: 20 MG/DL (ref 6–23)
BUN SERPL-MCNC: 20 MG/DL (ref 6–23)
BUN SERPL-MCNC: 24 MG/DL (ref 6–23)
BUN SERPL-MCNC: 28 MG/DL (ref 6–23)
BUN SERPL-MCNC: 28 MG/DL (ref 6–23)
BUN SERPL-MCNC: 30 MG/DL (ref 6–23)
CALCIUM SERPL-MCNC: 8.1 MG/DL (ref 8.6–10.3)
CALCIUM SERPL-MCNC: 8.2 MG/DL (ref 8.6–10.3)
CALCIUM SERPL-MCNC: 8.2 MG/DL (ref 8.6–10.3)
CALCIUM SERPL-MCNC: 8.3 MG/DL (ref 8.6–10.3)
CALCIUM SERPL-MCNC: 8.4 MG/DL (ref 8.6–10.3)
CALCIUM SERPL-MCNC: 8.5 MG/DL (ref 8.6–10.3)
CARDIAC TROPONIN I PNL SERPL HS: 29 NG/L (ref 0–20)
CHLORIDE SERPL-SCNC: 100 MMOL/L (ref 98–107)
CHLORIDE SERPL-SCNC: 100 MMOL/L (ref 98–107)
CHLORIDE SERPL-SCNC: 101 MMOL/L (ref 98–107)
CHLORIDE SERPL-SCNC: 101 MMOL/L (ref 98–107)
CHLORIDE SERPL-SCNC: 102 MMOL/L (ref 98–107)
CHLORIDE SERPL-SCNC: 99 MMOL/L (ref 98–107)
CLARITY FLD: ABNORMAL
CO2 SERPL-SCNC: 24 MMOL/L (ref 21–32)
CO2 SERPL-SCNC: 25 MMOL/L (ref 21–32)
CO2 SERPL-SCNC: 28 MMOL/L (ref 21–32)
COLOR FLD: ABNORMAL
CREAT SERPL-MCNC: 1.7 MG/DL (ref 0.5–1.3)
CREAT SERPL-MCNC: 1.76 MG/DL (ref 0.5–1.3)
CREAT SERPL-MCNC: 1.78 MG/DL (ref 0.5–1.3)
CREAT SERPL-MCNC: 1.81 MG/DL (ref 0.5–1.3)
CREAT SERPL-MCNC: 1.9 MG/DL (ref 0.5–1.3)
CREAT SERPL-MCNC: 1.91 MG/DL (ref 0.5–1.3)
CRP SERPL-MCNC: 6.51 MG/DL
CRYSTALS FLD MICRO: NORMAL
EGFRCR SERPLBLD CKD-EPI 2021: 32 ML/MIN/1.73M*2
EGFRCR SERPLBLD CKD-EPI 2021: 32 ML/MIN/1.73M*2
EGFRCR SERPLBLD CKD-EPI 2021: 34 ML/MIN/1.73M*2
EGFRCR SERPLBLD CKD-EPI 2021: 35 ML/MIN/1.73M*2
EGFRCR SERPLBLD CKD-EPI 2021: 36 ML/MIN/1.73M*2
EGFRCR SERPLBLD CKD-EPI 2021: 37 ML/MIN/1.73M*2
EOSINOPHIL NFR FLD MANUAL: 0 %
ERYTHROCYTE [DISTWIDTH] IN BLOOD BY AUTOMATED COUNT: 13.3 % (ref 11.5–14.5)
ERYTHROCYTE [DISTWIDTH] IN BLOOD BY AUTOMATED COUNT: 13.4 % (ref 11.5–14.5)
ERYTHROCYTE [SEDIMENTATION RATE] IN BLOOD BY WESTERGREN METHOD: 29 MM/H (ref 0–20)
FLUAV RNA RESP QL NAA+PROBE: DETECTED
FLUBV RNA RESP QL NAA+PROBE: NOT DETECTED
GLUCOSE BLD MANUAL STRIP-MCNC: 102 MG/DL (ref 74–99)
GLUCOSE BLD MANUAL STRIP-MCNC: 109 MG/DL (ref 74–99)
GLUCOSE BLD MANUAL STRIP-MCNC: 110 MG/DL (ref 74–99)
GLUCOSE BLD MANUAL STRIP-MCNC: 119 MG/DL (ref 74–99)
GLUCOSE BLD MANUAL STRIP-MCNC: 121 MG/DL (ref 74–99)
GLUCOSE BLD MANUAL STRIP-MCNC: 132 MG/DL (ref 74–99)
GLUCOSE BLD MANUAL STRIP-MCNC: 137 MG/DL (ref 74–99)
GLUCOSE BLD MANUAL STRIP-MCNC: 163 MG/DL (ref 74–99)
GLUCOSE BLD MANUAL STRIP-MCNC: 98 MG/DL (ref 74–99)
GLUCOSE SERPL-MCNC: 103 MG/DL (ref 74–99)
GLUCOSE SERPL-MCNC: 110 MG/DL (ref 74–99)
GLUCOSE SERPL-MCNC: 87 MG/DL (ref 74–99)
GLUCOSE SERPL-MCNC: 89 MG/DL (ref 74–99)
GLUCOSE SERPL-MCNC: 98 MG/DL (ref 74–99)
GLUCOSE SERPL-MCNC: 99 MG/DL (ref 74–99)
GRAM STN SPEC: NORMAL
GRAM STN SPEC: NORMAL
HCT VFR BLD AUTO: 35.4 % (ref 41–52)
HCT VFR BLD AUTO: 35.5 % (ref 41–52)
HCT VFR BLD AUTO: 35.8 % (ref 41–52)
HCT VFR BLD AUTO: 37 % (ref 41–52)
HCT VFR BLD AUTO: 37 % (ref 41–52)
HCT VFR BLD AUTO: 38.7 % (ref 41–52)
HGB BLD-MCNC: 11.2 G/DL (ref 13.5–17.5)
HGB BLD-MCNC: 11.3 G/DL (ref 13.5–17.5)
HGB BLD-MCNC: 11.4 G/DL (ref 13.5–17.5)
HGB BLD-MCNC: 12 G/DL (ref 13.5–17.5)
IMMATURE GRANULOCYTES IN FLUID: 0 %
LEGIONELLA AG UR QL: NEGATIVE
LYMPHOCYTES NFR FLD MANUAL: 1 %
MAGNESIUM SERPL-MCNC: 1.73 MG/DL (ref 1.6–2.4)
MAGNESIUM SERPL-MCNC: 1.75 MG/DL (ref 1.6–2.4)
MAGNESIUM SERPL-MCNC: 1.81 MG/DL (ref 1.6–2.4)
MAGNESIUM SERPL-MCNC: 1.83 MG/DL (ref 1.6–2.4)
MAGNESIUM SERPL-MCNC: 1.99 MG/DL (ref 1.6–2.4)
MCH RBC QN AUTO: 31.1 PG (ref 26–34)
MCH RBC QN AUTO: 31.2 PG (ref 26–34)
MCH RBC QN AUTO: 31.4 PG (ref 26–34)
MCH RBC QN AUTO: 31.5 PG (ref 26–34)
MCH RBC QN AUTO: 31.6 PG (ref 26–34)
MCH RBC QN AUTO: 31.8 PG (ref 26–34)
MCHC RBC AUTO-ENTMCNC: 30.8 G/DL (ref 32–36)
MCHC RBC AUTO-ENTMCNC: 30.8 G/DL (ref 32–36)
MCHC RBC AUTO-ENTMCNC: 31 G/DL (ref 32–36)
MCHC RBC AUTO-ENTMCNC: 31.6 G/DL (ref 32–36)
MCHC RBC AUTO-ENTMCNC: 31.8 G/DL (ref 32–36)
MCHC RBC AUTO-ENTMCNC: 31.8 G/DL (ref 32–36)
MCV RBC AUTO: 100 FL (ref 80–100)
MCV RBC AUTO: 100 FL (ref 80–100)
MCV RBC AUTO: 101 FL (ref 80–100)
MCV RBC AUTO: 103 FL (ref 80–100)
MCV RBC AUTO: 99 FL (ref 80–100)
MCV RBC AUTO: 99 FL (ref 80–100)
MONOS+MACROS NFR FLD MANUAL: 0 %
NEUTROPHILS NFR FLD MANUAL: 99 %
NRBC BLD-RTO: 0 /100 WBCS (ref 0–0)
OTHER CELLS NFR FLD MANUAL: 0 %
PHOSPHATE SERPL-MCNC: 2.8 MG/DL (ref 2.5–4.9)
PHOSPHATE SERPL-MCNC: 2.9 MG/DL (ref 2.5–4.9)
PHOSPHATE SERPL-MCNC: 3 MG/DL (ref 2.5–4.9)
PHOSPHATE SERPL-MCNC: 3.1 MG/DL (ref 2.5–4.9)
PHOSPHATE SERPL-MCNC: 3.5 MG/DL (ref 2.5–4.9)
PLASMA CELLS NFR FLD MANUAL: 0 %
PLATELET # BLD AUTO: 232 X10*3/UL (ref 150–450)
PLATELET # BLD AUTO: 238 X10*3/UL (ref 150–450)
PLATELET # BLD AUTO: 238 X10*3/UL (ref 150–450)
PLATELET # BLD AUTO: 258 X10*3/UL (ref 150–450)
PLATELET # BLD AUTO: 267 X10*3/UL (ref 150–450)
PLATELET # BLD AUTO: 285 X10*3/UL (ref 150–450)
POTASSIUM SERPL-SCNC: 3.1 MMOL/L (ref 3.5–5.3)
POTASSIUM SERPL-SCNC: 3.3 MMOL/L (ref 3.5–5.3)
POTASSIUM SERPL-SCNC: 3.4 MMOL/L (ref 3.5–5.3)
POTASSIUM SERPL-SCNC: 3.6 MMOL/L (ref 3.5–5.3)
POTASSIUM SERPL-SCNC: 3.7 MMOL/L (ref 3.5–5.3)
POTASSIUM SERPL-SCNC: 4.4 MMOL/L (ref 3.5–5.3)
PROCALCITONIN SERPL-MCNC: 0.2 NG/ML
PROT SERPL-MCNC: 6.5 G/DL (ref 6.4–8.2)
Q ONSET: 185 MS
Q ONSET: 186 MS
QRS COUNT: 10 BEATS
QRS COUNT: 11 BEATS
QRS DURATION: 178 MS
QRS DURATION: 182 MS
QT INTERVAL: 512 MS
QT INTERVAL: 512 MS
QTC CALCULATION(BAZETT): 532 MS
QTC CALCULATION(BAZETT): 536 MS
QTC FREDERICIA: 525 MS
QTC FREDERICIA: 529 MS
R AXIS: -81 DEGREES
R AXIS: -82 DEGREES
RBC # BLD AUTO: 3.57 X10*6/UL (ref 4.5–5.9)
RBC # BLD AUTO: 3.59 X10*6/UL (ref 4.5–5.9)
RBC # BLD AUTO: 3.59 X10*6/UL (ref 4.5–5.9)
RBC # BLD AUTO: 3.61 X10*6/UL (ref 4.5–5.9)
RBC # BLD AUTO: 3.65 X10*6/UL (ref 4.5–5.9)
RBC # BLD AUTO: 3.86 X10*6/UL (ref 4.5–5.9)
RBC # FLD AUTO: 1000 /UL
RSV RNA RESP QL NAA+PROBE: NOT DETECTED
S PNEUM AG UR QL: NEGATIVE
SARS-COV-2 RNA RESP QL NAA+PROBE: NOT DETECTED
SODIUM SERPL-SCNC: 135 MMOL/L (ref 136–145)
SODIUM SERPL-SCNC: 135 MMOL/L (ref 136–145)
SODIUM SERPL-SCNC: 136 MMOL/L (ref 136–145)
SODIUM SERPL-SCNC: 137 MMOL/L (ref 136–145)
STAPHYLOCOCCUS SPEC CULT: ABNORMAL
T AXIS: 90 DEGREES
T AXIS: 90 DEGREES
T OFFSET: 441 MS
T OFFSET: 442 MS
TOTAL CELLS COUNTED FLD: 100
URATE SERPL-MCNC: 5.7 MG/DL (ref 4–7.5)
VENTRICULAR RATE: 65 BPM
VENTRICULAR RATE: 66 BPM
WBC # BLD AUTO: 4.6 X10*3/UL (ref 4.4–11.3)
WBC # BLD AUTO: 6.3 X10*3/UL (ref 4.4–11.3)
WBC # BLD AUTO: 7.6 X10*3/UL (ref 4.4–11.3)
WBC # BLD AUTO: 8.6 X10*3/UL (ref 4.4–11.3)
WBC # BLD AUTO: 8.6 X10*3/UL (ref 4.4–11.3)
WBC # BLD AUTO: 8.7 X10*3/UL (ref 4.4–11.3)
WBC # FLD AUTO: ABNORMAL /UL

## 2024-01-01 PROCEDURE — 82947 ASSAY GLUCOSE BLOOD QUANT: CPT

## 2024-01-01 PROCEDURE — 73564 X-RAY EXAM KNEE 4 OR MORE: CPT | Mod: RT

## 2024-01-01 PROCEDURE — 97535 SELF CARE MNGMENT TRAINING: CPT | Mod: GO | Performed by: OCCUPATIONAL THERAPIST

## 2024-01-01 PROCEDURE — 2500000005 HC RX 250 GENERAL PHARMACY W/O HCPCS

## 2024-01-01 PROCEDURE — 99309 SBSQ NF CARE MODERATE MDM 30: CPT | Performed by: STUDENT IN AN ORGANIZED HEALTH CARE EDUCATION/TRAINING PROGRAM

## 2024-01-01 PROCEDURE — 99221 1ST HOSP IP/OBS SF/LOW 40: CPT | Performed by: ORTHOPAEDIC SURGERY

## 2024-01-01 PROCEDURE — 94640 AIRWAY INHALATION TREATMENT: CPT

## 2024-01-01 PROCEDURE — 2500000001 HC RX 250 WO HCPCS SELF ADMINISTERED DRUGS (ALT 637 FOR MEDICARE OP)

## 2024-01-01 PROCEDURE — 99308 SBSQ NF CARE LOW MDM 20: CPT | Performed by: STUDENT IN AN ORGANIZED HEALTH CARE EDUCATION/TRAINING PROGRAM

## 2024-01-01 PROCEDURE — 1200000002 HC GENERAL ROOM WITH TELEMETRY DAILY

## 2024-01-01 PROCEDURE — 85027 COMPLETE CBC AUTOMATED: CPT

## 2024-01-01 PROCEDURE — 96375 TX/PRO/DX INJ NEW DRUG ADDON: CPT

## 2024-01-01 PROCEDURE — 84145 PROCALCITONIN (PCT): CPT | Mod: STJLAB

## 2024-01-01 PROCEDURE — 2060000001 HC INTERMEDIATE ICU ROOM DAILY

## 2024-01-01 PROCEDURE — 80069 RENAL FUNCTION PANEL: CPT

## 2024-01-01 PROCEDURE — 2500000002 HC RX 250 W HCPCS SELF ADMINISTERED DRUGS (ALT 637 FOR MEDICARE OP, ALT 636 FOR OP/ED)

## 2024-01-01 PROCEDURE — 97110 THERAPEUTIC EXERCISES: CPT | Mod: GP,CQ

## 2024-01-01 PROCEDURE — 1157F ADVNC CARE PLAN IN RCRD: CPT | Performed by: INTERNAL MEDICINE

## 2024-01-01 PROCEDURE — 99232 SBSQ HOSP IP/OBS MODERATE 35: CPT

## 2024-01-01 PROCEDURE — 36415 COLL VENOUS BLD VENIPUNCTURE: CPT

## 2024-01-01 PROCEDURE — 1160F RVW MEDS BY RX/DR IN RCRD: CPT | Performed by: OTOLARYNGOLOGY

## 2024-01-01 PROCEDURE — 87449 NOS EACH ORGANISM AG IA: CPT | Mod: STJLAB

## 2024-01-01 PROCEDURE — 99232 SBSQ HOSP IP/OBS MODERATE 35: CPT | Performed by: ORTHOPAEDIC SURGERY

## 2024-01-01 PROCEDURE — 2500000004 HC RX 250 GENERAL PHARMACY W/ HCPCS (ALT 636 FOR OP/ED)

## 2024-01-01 PROCEDURE — 93005 ELECTROCARDIOGRAM TRACING: CPT

## 2024-01-01 PROCEDURE — 97165 OT EVAL LOW COMPLEX 30 MIN: CPT | Mod: GO

## 2024-01-01 PROCEDURE — 71045 X-RAY EXAM CHEST 1 VIEW: CPT

## 2024-01-01 PROCEDURE — 99214 OFFICE O/P EST MOD 30 MIN: CPT | Performed by: INTERNAL MEDICINE

## 2024-01-01 PROCEDURE — 93294 REM INTERROG EVL PM/LDLS PM: CPT | Performed by: INTERNAL MEDICINE

## 2024-01-01 PROCEDURE — 1036F TOBACCO NON-USER: CPT | Performed by: INTERNAL MEDICINE

## 2024-01-01 PROCEDURE — 85027 COMPLETE CBC AUTOMATED: CPT | Performed by: STUDENT IN AN ORGANIZED HEALTH CARE EDUCATION/TRAINING PROGRAM

## 2024-01-01 PROCEDURE — 97140 MANUAL THERAPY 1/> REGIONS: CPT | Mod: GO | Performed by: OCCUPATIONAL THERAPIST

## 2024-01-01 PROCEDURE — 83735 ASSAY OF MAGNESIUM: CPT

## 2024-01-01 PROCEDURE — 84075 ASSAY ALKALINE PHOSPHATASE: CPT | Performed by: STUDENT IN AN ORGANIZED HEALTH CARE EDUCATION/TRAINING PROGRAM

## 2024-01-01 PROCEDURE — 89051 BODY FLUID CELL COUNT: CPT | Performed by: PHYSICIAN ASSISTANT

## 2024-01-01 PROCEDURE — 0S9C3ZX DRAINAGE OF RIGHT KNEE JOINT, PERCUTANEOUS APPROACH, DIAGNOSTIC: ICD-10-PCS | Performed by: ORTHOPAEDIC SURGERY

## 2024-01-01 PROCEDURE — 93296 REM INTERROG EVL PM/IDS: CPT

## 2024-01-01 PROCEDURE — 97140 MANUAL THERAPY 1/> REGIONS: CPT | Mod: GO,CO

## 2024-01-01 PROCEDURE — 99231 SBSQ HOSP IP/OBS SF/LOW 25: CPT | Performed by: PHYSICIAN ASSISTANT

## 2024-01-01 PROCEDURE — 2500000001 HC RX 250 WO HCPCS SELF ADMINISTERED DRUGS (ALT 637 FOR MEDICARE OP): Performed by: STUDENT IN AN ORGANIZED HEALTH CARE EDUCATION/TRAINING PROGRAM

## 2024-01-01 PROCEDURE — 93010 ELECTROCARDIOGRAM REPORT: CPT | Performed by: INTERNAL MEDICINE

## 2024-01-01 PROCEDURE — 97535 SELF CARE MNGMENT TRAINING: CPT | Mod: GO,CO

## 2024-01-01 PROCEDURE — 87081 CULTURE SCREEN ONLY: CPT | Mod: STJLAB

## 2024-01-01 PROCEDURE — 94668 MNPJ CHEST WALL SBSQ: CPT

## 2024-01-01 PROCEDURE — 99213 OFFICE O/P EST LOW 20 MIN: CPT | Performed by: OTOLARYNGOLOGY

## 2024-01-01 PROCEDURE — 2500000004 HC RX 250 GENERAL PHARMACY W/ HCPCS (ALT 636 FOR OP/ED): Performed by: PHYSICIAN ASSISTANT

## 2024-01-01 PROCEDURE — 99223 1ST HOSP IP/OBS HIGH 75: CPT

## 2024-01-01 PROCEDURE — 36415 COLL VENOUS BLD VENIPUNCTURE: CPT | Performed by: STUDENT IN AN ORGANIZED HEALTH CARE EDUCATION/TRAINING PROGRAM

## 2024-01-01 PROCEDURE — 97530 THERAPEUTIC ACTIVITIES: CPT | Mod: GP

## 2024-01-01 PROCEDURE — 71045 X-RAY EXAM CHEST 1 VIEW: CPT | Performed by: RADIOLOGY

## 2024-01-01 PROCEDURE — 73564 X-RAY EXAM KNEE 4 OR MORE: CPT | Mod: RIGHT SIDE | Performed by: RADIOLOGY

## 2024-01-01 PROCEDURE — 1157F ADVNC CARE PLAN IN RCRD: CPT | Performed by: OTOLARYNGOLOGY

## 2024-01-01 PROCEDURE — 87899 AGENT NOS ASSAY W/OPTIC: CPT | Mod: STJLAB

## 2024-01-01 PROCEDURE — 3075F SYST BP GE 130 - 139MM HG: CPT | Performed by: INTERNAL MEDICINE

## 2024-01-01 PROCEDURE — 97530 THERAPEUTIC ACTIVITIES: CPT | Mod: GP,CQ

## 2024-01-01 PROCEDURE — 96367 TX/PROPH/DG ADDL SEQ IV INF: CPT

## 2024-01-01 PROCEDURE — 2500000005 HC RX 250 GENERAL PHARMACY W/O HCPCS: Performed by: PHYSICIAN ASSISTANT

## 2024-01-01 PROCEDURE — 20610 DRAIN/INJ JOINT/BURSA W/O US: CPT | Performed by: ORTHOPAEDIC SURGERY

## 2024-01-01 PROCEDURE — 99306 1ST NF CARE HIGH MDM 50: CPT | Performed by: STUDENT IN AN ORGANIZED HEALTH CARE EDUCATION/TRAINING PROGRAM

## 2024-01-01 PROCEDURE — 99285 EMERGENCY DEPT VISIT HI MDM: CPT | Performed by: STUDENT IN AN ORGANIZED HEALTH CARE EDUCATION/TRAINING PROGRAM

## 2024-01-01 PROCEDURE — 1160F RVW MEDS BY RX/DR IN RCRD: CPT | Performed by: INTERNAL MEDICINE

## 2024-01-01 PROCEDURE — 1159F MED LIST DOCD IN RCRD: CPT | Performed by: OTOLARYNGOLOGY

## 2024-01-01 PROCEDURE — 1126F AMNT PAIN NOTED NONE PRSNT: CPT | Performed by: INTERNAL MEDICINE

## 2024-01-01 PROCEDURE — 99285 EMERGENCY DEPT VISIT HI MDM: CPT | Mod: 25

## 2024-01-01 PROCEDURE — 86140 C-REACTIVE PROTEIN: CPT | Performed by: STUDENT IN AN ORGANIZED HEALTH CARE EDUCATION/TRAINING PROGRAM

## 2024-01-01 PROCEDURE — 97112 NEUROMUSCULAR REEDUCATION: CPT | Mod: GO

## 2024-01-01 PROCEDURE — 87637 SARSCOV2&INF A&B&RSV AMP PRB: CPT

## 2024-01-01 PROCEDURE — 2500000004 HC RX 250 GENERAL PHARMACY W/ HCPCS (ALT 636 FOR OP/ED): Performed by: STUDENT IN AN ORGANIZED HEALTH CARE EDUCATION/TRAINING PROGRAM

## 2024-01-01 PROCEDURE — 83880 ASSAY OF NATRIURETIC PEPTIDE: CPT | Performed by: STUDENT IN AN ORGANIZED HEALTH CARE EDUCATION/TRAINING PROGRAM

## 2024-01-01 PROCEDURE — 87040 BLOOD CULTURE FOR BACTERIA: CPT | Mod: STJLAB

## 2024-01-01 PROCEDURE — 85652 RBC SED RATE AUTOMATED: CPT | Performed by: STUDENT IN AN ORGANIZED HEALTH CARE EDUCATION/TRAINING PROGRAM

## 2024-01-01 PROCEDURE — 3079F DIAST BP 80-89 MM HG: CPT | Performed by: INTERNAL MEDICINE

## 2024-01-01 PROCEDURE — 87075 CULTR BACTERIA EXCEPT BLOOD: CPT | Mod: 59,STJLAB | Performed by: PHYSICIAN ASSISTANT

## 2024-01-01 PROCEDURE — 84484 ASSAY OF TROPONIN QUANT: CPT | Performed by: STUDENT IN AN ORGANIZED HEALTH CARE EDUCATION/TRAINING PROGRAM

## 2024-01-01 PROCEDURE — 97161 PT EVAL LOW COMPLEX 20 MIN: CPT | Mod: GP

## 2024-01-01 PROCEDURE — 20610 DRAIN/INJ JOINT/BURSA W/O US: CPT | Mod: RT | Performed by: ORTHOPAEDIC SURGERY

## 2024-01-01 PROCEDURE — 1159F MED LIST DOCD IN RCRD: CPT | Performed by: INTERNAL MEDICINE

## 2024-01-01 PROCEDURE — 96365 THER/PROPH/DIAG IV INF INIT: CPT

## 2024-01-01 PROCEDURE — 84100 ASSAY OF PHOSPHORUS: CPT

## 2024-01-01 PROCEDURE — 97110 THERAPEUTIC EXERCISES: CPT | Mod: GO,CO

## 2024-01-01 PROCEDURE — 89060 EXAM SYNOVIAL FLUID CRYSTALS: CPT | Mod: STJLAB | Performed by: PHYSICIAN ASSISTANT

## 2024-01-01 PROCEDURE — 84550 ASSAY OF BLOOD/URIC ACID: CPT

## 2024-01-01 RX ORDER — FUROSEMIDE 20 MG/1
20 TABLET ORAL DAILY
Qty: 3 TABLET | Refills: 0
Start: 2024-01-01 | End: 2024-01-01

## 2024-01-01 RX ORDER — INSULIN LISPRO 100 [IU]/ML
0-10 INJECTION, SOLUTION INTRAVENOUS; SUBCUTANEOUS
Status: DISCONTINUED | OUTPATIENT
Start: 2024-01-01 | End: 2024-01-01

## 2024-01-01 RX ORDER — POTASSIUM CHLORIDE 1.5 G/1.58G
40 POWDER, FOR SOLUTION ORAL ONCE
Status: COMPLETED | OUTPATIENT
Start: 2024-01-01 | End: 2024-01-01

## 2024-01-01 RX ORDER — POLYETHYLENE GLYCOL 3350 17 G/17G
17 POWDER, FOR SOLUTION ORAL DAILY
Status: DISCONTINUED | OUTPATIENT
Start: 2024-01-01 | End: 2024-01-01

## 2024-01-01 RX ORDER — NYSTATIN 100000 [USP'U]/G
1 POWDER TOPICAL 2 TIMES DAILY
Status: DISCONTINUED | OUTPATIENT
Start: 2024-01-01 | End: 2024-01-01 | Stop reason: HOSPADM

## 2024-01-01 RX ORDER — CEFTRIAXONE 1 G/50ML
1 INJECTION, SOLUTION INTRAVENOUS ONCE
Status: DISCONTINUED | OUTPATIENT
Start: 2024-01-01 | End: 2024-01-01

## 2024-01-01 RX ORDER — LOPERAMIDE HYDROCHLORIDE 2 MG/1
2 CAPSULE ORAL 4 TIMES DAILY PRN
Status: ACTIVE | OUTPATIENT
Start: 2024-01-01 | End: 2024-01-01

## 2024-01-01 RX ORDER — POLYMYXIN B SULFATE AND TRIMETHOPRIM 1; 10000 MG/ML; [USP'U]/ML
1 SOLUTION OPHTHALMIC
Status: DISCONTINUED | OUTPATIENT
Start: 2024-01-01 | End: 2024-01-01 | Stop reason: HOSPADM

## 2024-01-01 RX ORDER — DEXTROSE 50 % IN WATER (D50W) INTRAVENOUS SYRINGE
25
Status: DISCONTINUED | OUTPATIENT
Start: 2024-01-01 | End: 2024-01-01

## 2024-01-01 RX ORDER — TAMSULOSIN HYDROCHLORIDE 0.4 MG/1
0.4 CAPSULE ORAL DAILY
Status: DISCONTINUED | OUTPATIENT
Start: 2024-01-01 | End: 2024-01-01 | Stop reason: HOSPADM

## 2024-01-01 RX ORDER — HYDROXYZINE HYDROCHLORIDE 25 MG/1
25 TABLET, FILM COATED ORAL NIGHTLY
Start: 2024-01-01 | End: 2024-06-01

## 2024-01-01 RX ORDER — DEXTROSE 50 % IN WATER (D50W) INTRAVENOUS SYRINGE
12.5
Status: DISCONTINUED | OUTPATIENT
Start: 2024-01-01 | End: 2024-01-01

## 2024-01-01 RX ORDER — LIDOCAINE HYDROCHLORIDE 10 MG/ML
2 INJECTION, SOLUTION EPIDURAL; INFILTRATION; INTRACAUDAL; PERINEURAL ONCE
Status: COMPLETED | OUTPATIENT
Start: 2024-01-01 | End: 2024-01-01

## 2024-01-01 RX ORDER — ISOSORBIDE MONONITRATE 60 MG/1
60 TABLET, EXTENDED RELEASE ORAL 2 TIMES DAILY
Status: DISCONTINUED | OUTPATIENT
Start: 2024-01-01 | End: 2024-01-01 | Stop reason: HOSPADM

## 2024-01-01 RX ORDER — IPRATROPIUM BROMIDE AND ALBUTEROL SULFATE 2.5; .5 MG/3ML; MG/3ML
3 SOLUTION RESPIRATORY (INHALATION)
Status: DISCONTINUED | OUTPATIENT
Start: 2024-01-01 | End: 2024-01-01

## 2024-01-01 RX ORDER — POLYETHYLENE GLYCOL 3350 17 G/17G
17 POWDER, FOR SOLUTION ORAL DAILY PRN
Status: CANCELLED | OUTPATIENT
Start: 2024-01-01

## 2024-01-01 RX ORDER — POTASSIUM CHLORIDE 20 MEQ/1
40 TABLET, EXTENDED RELEASE ORAL ONCE
Status: COMPLETED | OUTPATIENT
Start: 2024-01-01 | End: 2024-01-01

## 2024-01-01 RX ORDER — METOPROLOL TARTRATE 50 MG/1
50 TABLET ORAL 2 TIMES DAILY
Status: DISCONTINUED | OUTPATIENT
Start: 2024-01-01 | End: 2024-01-01 | Stop reason: HOSPADM

## 2024-01-01 RX ORDER — HYDROXYZINE HYDROCHLORIDE 25 MG/1
25 TABLET, FILM COATED ORAL NIGHTLY
Status: DISCONTINUED | OUTPATIENT
Start: 2024-01-01 | End: 2024-01-01 | Stop reason: HOSPADM

## 2024-01-01 RX ORDER — FUROSEMIDE 10 MG/ML
40 INJECTION INTRAMUSCULAR; INTRAVENOUS ONCE
Status: COMPLETED | OUTPATIENT
Start: 2024-01-01 | End: 2024-01-01

## 2024-01-01 RX ORDER — POLYMYXIN B SULFATE AND TRIMETHOPRIM 1; 10000 MG/ML; [USP'U]/ML
1 SOLUTION OPHTHALMIC
Qty: 10 ML | Refills: 0
Start: 2024-01-01 | End: 2024-01-01

## 2024-01-01 RX ORDER — TRIAMCINOLONE ACETONIDE 40 MG/ML
40 INJECTION, SUSPENSION INTRA-ARTICULAR; INTRAMUSCULAR ONCE
Status: COMPLETED | OUTPATIENT
Start: 2024-01-01 | End: 2024-01-01

## 2024-01-01 RX ORDER — ACETAMINOPHEN 500 MG
5 TABLET ORAL NIGHTLY PRN
Status: DISCONTINUED | OUTPATIENT
Start: 2024-01-01 | End: 2024-01-01 | Stop reason: HOSPADM

## 2024-01-01 RX ORDER — LEVOTHYROXINE SODIUM 75 UG/1
75 TABLET ORAL DAILY
Status: DISCONTINUED | OUTPATIENT
Start: 2024-01-01 | End: 2024-01-01 | Stop reason: HOSPADM

## 2024-01-01 RX ORDER — POLYMYXIN B SULFATE AND TRIMETHOPRIM 1; 10000 MG/ML; [USP'U]/ML
1 SOLUTION OPHTHALMIC
Status: DISCONTINUED | OUTPATIENT
Start: 2024-01-01 | End: 2024-01-01

## 2024-01-01 RX ORDER — DICLOFENAC SODIUM 10 MG/G
4 GEL TOPICAL 4 TIMES DAILY PRN
Status: DISCONTINUED | OUTPATIENT
Start: 2024-01-01 | End: 2024-01-01 | Stop reason: HOSPADM

## 2024-01-01 RX ORDER — OSELTAMIVIR PHOSPHATE 75 MG/1
75 CAPSULE ORAL 2 TIMES DAILY
Qty: 10 CAPSULE | Refills: 0 | Status: SHIPPED | OUTPATIENT
Start: 2024-01-01 | End: 2024-01-01 | Stop reason: SDUPTHER

## 2024-01-01 RX ORDER — IPRATROPIUM BROMIDE AND ALBUTEROL SULFATE 2.5; .5 MG/3ML; MG/3ML
3 SOLUTION RESPIRATORY (INHALATION) EVERY 2 HOUR PRN
Status: DISCONTINUED | OUTPATIENT
Start: 2024-01-01 | End: 2024-01-01 | Stop reason: HOSPADM

## 2024-01-01 RX ORDER — LIDOCAINE 560 MG/1
1 PATCH PERCUTANEOUS; TOPICAL; TRANSDERMAL DAILY
Status: DISCONTINUED | OUTPATIENT
Start: 2024-01-01 | End: 2024-01-01

## 2024-01-01 RX ORDER — ACETAMINOPHEN 325 MG/1
650 TABLET ORAL EVERY 6 HOURS PRN
Status: DISCONTINUED | OUTPATIENT
Start: 2024-01-01 | End: 2024-01-01 | Stop reason: HOSPADM

## 2024-01-01 RX ORDER — CEFTRIAXONE 2 G/50ML
2 INJECTION, SOLUTION INTRAVENOUS EVERY 24 HOURS
Status: DISCONTINUED | OUTPATIENT
Start: 2024-01-01 | End: 2024-01-01

## 2024-01-01 RX ORDER — AZITHROMYCIN 500 MG/1
500 TABLET, FILM COATED ORAL
Status: DISCONTINUED | OUTPATIENT
Start: 2024-01-01 | End: 2024-01-01

## 2024-01-01 RX ORDER — LIDOCAINE 560 MG/1
1 PATCH PERCUTANEOUS; TOPICAL; TRANSDERMAL DAILY
Status: DISCONTINUED | OUTPATIENT
Start: 2024-01-01 | End: 2024-01-01 | Stop reason: HOSPADM

## 2024-01-01 RX ORDER — TAMSULOSIN HYDROCHLORIDE 0.4 MG/1
0.4 CAPSULE ORAL DAILY
Qty: 90 CAPSULE | Refills: 3 | Status: SHIPPED | OUTPATIENT
Start: 2024-01-01 | End: 2024-06-01

## 2024-01-01 RX ORDER — OSELTAMIVIR PHOSPHATE 75 MG/1
75 CAPSULE ORAL 2 TIMES DAILY
Qty: 10 CAPSULE | Refills: 0 | Status: SHIPPED | OUTPATIENT
Start: 2024-01-01 | End: 2024-01-01 | Stop reason: HOSPADM

## 2024-01-01 RX ORDER — CEFTRIAXONE 1 G/50ML
1 INJECTION, SOLUTION INTRAVENOUS ONCE
Status: COMPLETED | OUTPATIENT
Start: 2024-01-01 | End: 2024-01-01

## 2024-01-01 RX ORDER — IPRATROPIUM BROMIDE AND ALBUTEROL SULFATE 2.5; .5 MG/3ML; MG/3ML
3 SOLUTION RESPIRATORY (INHALATION) 3 TIMES DAILY
Status: DISCONTINUED | OUTPATIENT
Start: 2024-01-01 | End: 2024-01-01

## 2024-01-01 RX ADMIN — LIDOCAINE 4% 1 PATCH: 40 PATCH TOPICAL at 04:58

## 2024-01-01 RX ADMIN — POLYMYXIN B SULFATE AND TRIMETHOPRIM 1 DROP: 10000; 1 SOLUTION OPHTHALMIC at 22:00

## 2024-01-01 RX ADMIN — METOPROLOL TARTRATE 50 MG: 50 TABLET, FILM COATED ORAL at 20:23

## 2024-01-01 RX ADMIN — POLYMYXIN B SULFATE AND TRIMETHOPRIM 1 DROP: 10000; 1 SOLUTION OPHTHALMIC at 05:13

## 2024-01-01 RX ADMIN — IPRATROPIUM BROMIDE AND ALBUTEROL SULFATE 3 ML: 2.5; .5 SOLUTION RESPIRATORY (INHALATION) at 08:22

## 2024-01-01 RX ADMIN — POLYMYXIN B SULFATE AND TRIMETHOPRIM 1 DROP: 10000; 1 SOLUTION OPHTHALMIC at 09:13

## 2024-01-01 RX ADMIN — TAMSULOSIN HYDROCHLORIDE 0.4 MG: 0.4 CAPSULE ORAL at 08:49

## 2024-01-01 RX ADMIN — APIXABAN 2.5 MG: 2.5 TABLET, FILM COATED ORAL at 09:26

## 2024-01-01 RX ADMIN — Medication 2 L/MIN: at 08:00

## 2024-01-01 RX ADMIN — DEXTROSE MONOHYDRATE 500 MG: 50 INJECTION, SOLUTION INTRAVENOUS at 18:21

## 2024-01-01 RX ADMIN — ACETAMINOPHEN 650 MG: 325 TABLET ORAL at 08:49

## 2024-01-01 RX ADMIN — DOXYCYCLINE 100 MG: 100 INJECTION, POWDER, LYOPHILIZED, FOR SOLUTION INTRAVENOUS at 09:36

## 2024-01-01 RX ADMIN — FUROSEMIDE 40 MG: 10 INJECTION, SOLUTION INTRAMUSCULAR; INTRAVENOUS at 09:32

## 2024-01-01 RX ADMIN — ACETAMINOPHEN 650 MG: 325 TABLET ORAL at 04:58

## 2024-01-01 RX ADMIN — PSYLLIUM HUSK 1 PACKET: 3.4 POWDER ORAL at 09:39

## 2024-01-01 RX ADMIN — ISOSORBIDE MONONITRATE 60 MG: 60 TABLET, EXTENDED RELEASE ORAL at 09:27

## 2024-01-01 RX ADMIN — APIXABAN 2.5 MG: 2.5 TABLET, FILM COATED ORAL at 20:00

## 2024-01-01 RX ADMIN — IPRATROPIUM BROMIDE AND ALBUTEROL SULFATE 3 ML: 2.5; .5 SOLUTION RESPIRATORY (INHALATION) at 13:05

## 2024-01-01 RX ADMIN — DOXYCYCLINE 100 MG: 100 INJECTION, POWDER, LYOPHILIZED, FOR SOLUTION INTRAVENOUS at 22:32

## 2024-01-01 RX ADMIN — METOPROLOL TARTRATE 50 MG: 50 TABLET, FILM COATED ORAL at 22:12

## 2024-01-01 RX ADMIN — APIXABAN 2.5 MG: 2.5 TABLET, FILM COATED ORAL at 20:24

## 2024-01-01 RX ADMIN — APIXABAN 2.5 MG: 2.5 TABLET, FILM COATED ORAL at 20:34

## 2024-01-01 RX ADMIN — IPRATROPIUM BROMIDE AND ALBUTEROL SULFATE 3 ML: 2.5; .5 SOLUTION RESPIRATORY (INHALATION) at 20:23

## 2024-01-01 RX ADMIN — POLYMYXIN B SULFATE AND TRIMETHOPRIM 1 DROP: 10000; 1 SOLUTION OPHTHALMIC at 18:26

## 2024-01-01 RX ADMIN — POLYMYXIN B SULFATE AND TRIMETHOPRIM 1 DROP: 10000; 1 SOLUTION OPHTHALMIC at 18:00

## 2024-01-01 RX ADMIN — ISOSORBIDE MONONITRATE 60 MG: 60 TABLET, EXTENDED RELEASE ORAL at 08:49

## 2024-01-01 RX ADMIN — DOXYCYCLINE 100 MG: 100 INJECTION, POWDER, LYOPHILIZED, FOR SOLUTION INTRAVENOUS at 09:30

## 2024-01-01 RX ADMIN — Medication 2 L/MIN: at 08:30

## 2024-01-01 RX ADMIN — HYDROXYZINE HYDROCHLORIDE 25 MG: 25 TABLET ORAL at 20:00

## 2024-01-01 RX ADMIN — CEFTRIAXONE SODIUM 2 G: 2 INJECTION, SOLUTION INTRAVENOUS at 17:33

## 2024-01-01 RX ADMIN — POTASSIUM CHLORIDE 40 MEQ: 1500 TABLET, EXTENDED RELEASE ORAL at 09:29

## 2024-01-01 RX ADMIN — IPRATROPIUM BROMIDE AND ALBUTEROL SULFATE 3 ML: 2.5; .5 SOLUTION RESPIRATORY (INHALATION) at 08:27

## 2024-01-01 RX ADMIN — POLYMYXIN B SULFATE AND TRIMETHOPRIM 1 DROP: 10000; 1 SOLUTION OPHTHALMIC at 22:37

## 2024-01-01 RX ADMIN — TAMSULOSIN HYDROCHLORIDE 0.4 MG: 0.4 CAPSULE ORAL at 09:29

## 2024-01-01 RX ADMIN — NYSTATIN 1 APPLICATION: 100000 POWDER TOPICAL at 20:34

## 2024-01-01 RX ADMIN — POLYMYXIN B SULFATE AND TRIMETHOPRIM 1 DROP: 10000; 1 SOLUTION OPHTHALMIC at 09:43

## 2024-01-01 RX ADMIN — Medication 5 MG: at 20:00

## 2024-01-01 RX ADMIN — LEVOTHYROXINE SODIUM 75 MCG: 75 TABLET ORAL at 06:02

## 2024-01-01 RX ADMIN — POLYMYXIN B SULFATE AND TRIMETHOPRIM 1 DROP: 10000; 1 SOLUTION OPHTHALMIC at 13:26

## 2024-01-01 RX ADMIN — POLYMYXIN B SULFATE AND TRIMETHOPRIM 1 DROP: 10000; 1 SOLUTION OPHTHALMIC at 14:00

## 2024-01-01 RX ADMIN — ISOSORBIDE MONONITRATE 60 MG: 60 TABLET, EXTENDED RELEASE ORAL at 22:12

## 2024-01-01 RX ADMIN — LIDOCAINE 4% 1 PATCH: 40 PATCH TOPICAL at 09:20

## 2024-01-01 RX ADMIN — POLYMYXIN B SULFATE AND TRIMETHOPRIM 1 DROP: 10000; 1 SOLUTION OPHTHALMIC at 17:40

## 2024-01-01 RX ADMIN — POLYMYXIN B SULFATE AND TRIMETHOPRIM 1 DROP: 10000; 1 SOLUTION OPHTHALMIC at 10:37

## 2024-01-01 RX ADMIN — APIXABAN 2.5 MG: 2.5 TABLET, FILM COATED ORAL at 08:49

## 2024-01-01 RX ADMIN — ISOSORBIDE MONONITRATE 60 MG: 60 TABLET, EXTENDED RELEASE ORAL at 09:38

## 2024-01-01 RX ADMIN — POLYMYXIN B SULFATE AND TRIMETHOPRIM 1 DROP: 10000; 1 SOLUTION OPHTHALMIC at 02:00

## 2024-01-01 RX ADMIN — POLYMYXIN B SULFATE AND TRIMETHOPRIM 1 DROP: 10000; 1 SOLUTION OPHTHALMIC at 04:01

## 2024-01-01 RX ADMIN — NYSTATIN 1 APPLICATION: 100000 POWDER TOPICAL at 22:37

## 2024-01-01 RX ADMIN — ISOSORBIDE MONONITRATE 60 MG: 60 TABLET, EXTENDED RELEASE ORAL at 20:00

## 2024-01-01 RX ADMIN — LEVOTHYROXINE SODIUM 75 MCG: 75 TABLET ORAL at 06:41

## 2024-01-01 RX ADMIN — FUROSEMIDE 40 MG: 10 INJECTION, SOLUTION INTRAMUSCULAR; INTRAVENOUS at 12:05

## 2024-01-01 RX ADMIN — APIXABAN 2.5 MG: 2.5 TABLET, FILM COATED ORAL at 22:36

## 2024-01-01 RX ADMIN — ISOSORBIDE MONONITRATE 60 MG: 60 TABLET, EXTENDED RELEASE ORAL at 09:29

## 2024-01-01 RX ADMIN — POLYMYXIN B SULFATE AND TRIMETHOPRIM 1 DROP: 10000; 1 SOLUTION OPHTHALMIC at 21:16

## 2024-01-01 RX ADMIN — NYSTATIN 1 APPLICATION: 100000 POWDER TOPICAL at 08:50

## 2024-01-01 RX ADMIN — METOPROLOL TARTRATE 50 MG: 50 TABLET, FILM COATED ORAL at 20:00

## 2024-01-01 RX ADMIN — PSYLLIUM HUSK 1 PACKET: 3.4 POWDER ORAL at 08:50

## 2024-01-01 RX ADMIN — LEVOTHYROXINE SODIUM 75 MCG: 75 TABLET ORAL at 05:12

## 2024-01-01 RX ADMIN — IPRATROPIUM BROMIDE AND ALBUTEROL SULFATE 3 ML: 2.5; .5 SOLUTION RESPIRATORY (INHALATION) at 20:48

## 2024-01-01 RX ADMIN — METOPROLOL TARTRATE 50 MG: 50 TABLET, FILM COATED ORAL at 09:39

## 2024-01-01 RX ADMIN — PSYLLIUM HUSK 1 PACKET: 3.4 POWDER ORAL at 09:30

## 2024-01-01 RX ADMIN — POTASSIUM CHLORIDE 40 MEQ: 1.5 POWDER, FOR SOLUTION ORAL at 08:49

## 2024-01-01 RX ADMIN — LEVOTHYROXINE SODIUM 75 MCG: 75 TABLET ORAL at 05:01

## 2024-01-01 RX ADMIN — NYSTATIN 1 APPLICATION: 100000 POWDER TOPICAL at 09:30

## 2024-01-01 RX ADMIN — ACETAMINOPHEN 650 MG: 325 TABLET ORAL at 23:00

## 2024-01-01 RX ADMIN — METOPROLOL TARTRATE 50 MG: 50 TABLET, FILM COATED ORAL at 08:49

## 2024-01-01 RX ADMIN — FUROSEMIDE 40 MG: 10 INJECTION, SOLUTION INTRAMUSCULAR; INTRAVENOUS at 19:46

## 2024-01-01 RX ADMIN — METOPROLOL TARTRATE 50 MG: 50 TABLET, FILM COATED ORAL at 09:29

## 2024-01-01 RX ADMIN — ACETAMINOPHEN 650 MG: 325 TABLET ORAL at 15:19

## 2024-01-01 RX ADMIN — POLYETHYLENE GLYCOL 3350 17 G: 17 POWDER, FOR SOLUTION ORAL at 21:09

## 2024-01-01 RX ADMIN — APIXABAN 2.5 MG: 2.5 TABLET, FILM COATED ORAL at 09:39

## 2024-01-01 RX ADMIN — POLYETHYLENE GLYCOL 3350 17 G: 17 POWDER, FOR SOLUTION ORAL at 09:30

## 2024-01-01 RX ADMIN — ACETAMINOPHEN 650 MG: 325 TABLET ORAL at 00:48

## 2024-01-01 RX ADMIN — Medication: at 20:00

## 2024-01-01 RX ADMIN — LIDOCAINE HYDROCHLORIDE 20 MG: 10 INJECTION, SOLUTION EPIDURAL; INFILTRATION; INTRACAUDAL; PERINEURAL at 11:00

## 2024-01-01 RX ADMIN — TAMSULOSIN HYDROCHLORIDE 0.4 MG: 0.4 CAPSULE ORAL at 09:39

## 2024-01-01 RX ADMIN — LIDOCAINE 4% 1 PATCH: 40 PATCH TOPICAL at 08:50

## 2024-01-01 RX ADMIN — POTASSIUM CHLORIDE 40 MEQ: 1.5 POWDER, FOR SOLUTION ORAL at 16:09

## 2024-01-01 RX ADMIN — TRIAMCINOLONE ACETONIDE 40 MG: 40 INJECTION, SUSPENSION INTRA-ARTICULAR; INTRAMUSCULAR at 11:00

## 2024-01-01 RX ADMIN — METOPROLOL TARTRATE 50 MG: 50 TABLET, FILM COATED ORAL at 09:26

## 2024-01-01 RX ADMIN — NYSTATIN 1 APPLICATION: 100000 POWDER TOPICAL at 21:04

## 2024-01-01 RX ADMIN — POLYMYXIN B SULFATE AND TRIMETHOPRIM 1 DROP: 10000; 1 SOLUTION OPHTHALMIC at 06:16

## 2024-01-01 RX ADMIN — NYSTATIN 1 APPLICATION: 100000 POWDER TOPICAL at 20:27

## 2024-01-01 RX ADMIN — POLYMYXIN B SULFATE AND TRIMETHOPRIM 1 DROP: 10000; 1 SOLUTION OPHTHALMIC at 06:06

## 2024-01-01 RX ADMIN — ACETAMINOPHEN 650 MG: 325 TABLET ORAL at 13:25

## 2024-01-01 RX ADMIN — DOXYCYCLINE 100 MG: 100 INJECTION, POWDER, LYOPHILIZED, FOR SOLUTION INTRAVENOUS at 22:12

## 2024-01-01 RX ADMIN — POLYMYXIN B SULFATE AND TRIMETHOPRIM 1 DROP: 10000; 1 SOLUTION OPHTHALMIC at 02:24

## 2024-01-01 RX ADMIN — CEFTRIAXONE SODIUM 1 G: 1 INJECTION, SOLUTION INTRAVENOUS at 17:42

## 2024-01-01 RX ADMIN — NYSTATIN 1 APPLICATION: 100000 POWDER TOPICAL at 21:00

## 2024-01-01 RX ADMIN — ACETAMINOPHEN 650 MG: 325 TABLET ORAL at 03:34

## 2024-01-01 RX ADMIN — IPRATROPIUM BROMIDE AND ALBUTEROL SULFATE 3 ML: 2.5; .5 SOLUTION RESPIRATORY (INHALATION) at 05:19

## 2024-01-01 RX ADMIN — NYSTATIN 1 APPLICATION: 100000 POWDER TOPICAL at 09:15

## 2024-01-01 RX ADMIN — HYDROXYZINE HYDROCHLORIDE 25 MG: 25 TABLET ORAL at 20:24

## 2024-01-01 RX ADMIN — ISOSORBIDE MONONITRATE 60 MG: 60 TABLET, EXTENDED RELEASE ORAL at 20:24

## 2024-01-01 RX ADMIN — POLYMYXIN B SULFATE AND TRIMETHOPRIM 1 DROP: 10000; 1 SOLUTION OPHTHALMIC at 14:09

## 2024-01-01 RX ADMIN — TAMSULOSIN HYDROCHLORIDE 0.4 MG: 0.4 CAPSULE ORAL at 09:27

## 2024-01-01 RX ADMIN — PSYLLIUM HUSK 1 PACKET: 3.4 POWDER ORAL at 21:04

## 2024-01-01 RX ADMIN — LEVOTHYROXINE SODIUM 75 MCG: 75 TABLET ORAL at 06:06

## 2024-01-01 RX ADMIN — ACETAMINOPHEN 650 MG: 325 TABLET ORAL at 15:55

## 2024-01-01 RX ADMIN — APIXABAN 2.5 MG: 2.5 TABLET, FILM COATED ORAL at 22:13

## 2024-01-01 RX ADMIN — NYSTATIN 1 APPLICATION: 100000 POWDER TOPICAL at 09:46

## 2024-01-01 SDOH — SOCIAL STABILITY: SOCIAL INSECURITY: DO YOU FEEL ANYONE HAS EXPLOITED OR TAKEN ADVANTAGE OF YOU FINANCIALLY OR OF YOUR PERSONAL PROPERTY?: NO

## 2024-01-01 SDOH — SOCIAL STABILITY: SOCIAL INSECURITY: DO YOU FEEL UNSAFE GOING BACK TO THE PLACE WHERE YOU ARE LIVING?: NO

## 2024-01-01 SDOH — HEALTH STABILITY: MENTAL HEALTH: HOW OFTEN DO YOU HAVE A DRINK CONTAINING ALCOHOL?: NEVER

## 2024-01-01 SDOH — SOCIAL STABILITY: SOCIAL INSECURITY: HAS ANYONE EVER THREATENED TO HURT YOUR FAMILY OR YOUR PETS?: NO

## 2024-01-01 SDOH — HEALTH STABILITY: MENTAL HEALTH: HOW OFTEN DO YOU HAVE 6 OR MORE DRINKS ON ONE OCCASION?: NEVER

## 2024-01-01 SDOH — SOCIAL STABILITY: SOCIAL INSECURITY: ARE THERE ANY APPARENT SIGNS OF INJURIES/BEHAVIORS THAT COULD BE RELATED TO ABUSE/NEGLECT?: NO

## 2024-01-01 SDOH — HEALTH STABILITY: MENTAL HEALTH: HOW MANY STANDARD DRINKS CONTAINING ALCOHOL DO YOU HAVE ON A TYPICAL DAY?: PATIENT DOES NOT DRINK

## 2024-01-01 SDOH — SOCIAL STABILITY: SOCIAL INSECURITY: DOES ANYONE TRY TO KEEP YOU FROM HAVING/CONTACTING OTHER FRIENDS OR DOING THINGS OUTSIDE YOUR HOME?: NO

## 2024-01-01 SDOH — SOCIAL STABILITY: SOCIAL INSECURITY: WERE YOU ABLE TO COMPLETE ALL THE BEHAVIORAL HEALTH SCREENINGS?: YES

## 2024-01-01 SDOH — SOCIAL STABILITY: SOCIAL INSECURITY: HAVE YOU HAD THOUGHTS OF HARMING ANYONE ELSE?: NO

## 2024-01-01 SDOH — SOCIAL STABILITY: SOCIAL INSECURITY: ARE YOU OR HAVE YOU BEEN THREATENED OR ABUSED PHYSICALLY, EMOTIONALLY, OR SEXUALLY BY ANYONE?: NO

## 2024-01-01 ASSESSMENT — COGNITIVE AND FUNCTIONAL STATUS - GENERAL
EATING MEALS: A LITTLE
DRESSING REGULAR UPPER BODY CLOTHING: A LOT
TURNING FROM BACK TO SIDE WHILE IN FLAT BAD: A LOT
DAILY ACTIVITIY SCORE: 12
WALKING IN HOSPITAL ROOM: TOTAL
STANDING UP FROM CHAIR USING ARMS: TOTAL
STANDING UP FROM CHAIR USING ARMS: A LOT
TURNING FROM BACK TO SIDE WHILE IN FLAT BAD: TOTAL
STANDING UP FROM CHAIR USING ARMS: A LOT
DRESSING REGULAR LOWER BODY CLOTHING: TOTAL
DRESSING REGULAR UPPER BODY CLOTHING: A LOT
MOVING TO AND FROM BED TO CHAIR: TOTAL
MOVING FROM LYING ON BACK TO SITTING ON SIDE OF FLAT BED WITH BEDRAILS: A LOT
MOBILITY SCORE: 10
EATING MEALS: A LITTLE
STANDING UP FROM CHAIR USING ARMS: A LOT
WALKING IN HOSPITAL ROOM: TOTAL
MOVING FROM LYING ON BACK TO SITTING ON SIDE OF FLAT BED WITH BEDRAILS: A LOT
MOVING TO AND FROM BED TO CHAIR: TOTAL
MOVING TO AND FROM BED TO CHAIR: A LOT
MOBILITY SCORE: 12
EATING MEALS: A LOT
PERSONAL GROOMING: A LOT
DRESSING REGULAR UPPER BODY CLOTHING: A LOT
STANDING UP FROM CHAIR USING ARMS: TOTAL
DAILY ACTIVITIY SCORE: 9
DRESSING REGULAR LOWER BODY CLOTHING: A LOT
PERSONAL GROOMING: TOTAL
MOVING FROM LYING ON BACK TO SITTING ON SIDE OF FLAT BED WITH BEDRAILS: A LOT
DRESSING REGULAR UPPER BODY CLOTHING: A LITTLE
HELP NEEDED FOR BATHING: A LOT
STANDING UP FROM CHAIR USING ARMS: A LOT
PERSONAL GROOMING: A LITTLE
TURNING FROM BACK TO SIDE WHILE IN FLAT BAD: A LOT
DRESSING REGULAR LOWER BODY CLOTHING: A LOT
MOVING TO AND FROM BED TO CHAIR: A LOT
WALKING IN HOSPITAL ROOM: TOTAL
MOBILITY SCORE: 9
MOVING TO AND FROM BED TO CHAIR: A LOT
DRESSING REGULAR LOWER BODY CLOTHING: A LOT
EATING MEALS: A LOT
PERSONAL GROOMING: A LOT
DAILY ACTIVITIY SCORE: 9
STANDING UP FROM CHAIR USING ARMS: TOTAL
MOVING FROM LYING ON BACK TO SITTING ON SIDE OF FLAT BED WITH BEDRAILS: A LOT
WALKING IN HOSPITAL ROOM: TOTAL
EATING MEALS: A LITTLE
DAILY ACTIVITIY SCORE: 15
TURNING FROM BACK TO SIDE WHILE IN FLAT BAD: A LOT
WALKING IN HOSPITAL ROOM: TOTAL
MOVING TO AND FROM BED TO CHAIR: A LOT
MOBILITY SCORE: 11
TURNING FROM BACK TO SIDE WHILE IN FLAT BAD: A LOT
MOVING TO AND FROM BED TO CHAIR: A LOT
MOVING TO AND FROM BED TO CHAIR: TOTAL
STANDING UP FROM CHAIR USING ARMS: A LOT
TOILETING: TOTAL
MOVING FROM LYING ON BACK TO SITTING ON SIDE OF FLAT BED WITH BEDRAILS: A LOT
MOBILITY SCORE: 7
WALKING IN HOSPITAL ROOM: TOTAL
EATING MEALS: A LOT
MOVING FROM LYING ON BACK TO SITTING ON SIDE OF FLAT BED WITH BEDRAILS: A LOT
MOVING FROM LYING ON BACK TO SITTING ON SIDE OF FLAT BED WITH BEDRAILS: A LOT
DAILY ACTIVITIY SCORE: 7
MOBILITY SCORE: 10
PERSONAL GROOMING: A LOT
HELP NEEDED FOR BATHING: TOTAL
WALKING IN HOSPITAL ROOM: A LOT
CLIMB 3 TO 5 STEPS WITH RAILING: TOTAL
MOBILITY SCORE: 8
CLIMB 3 TO 5 STEPS WITH RAILING: TOTAL
HELP NEEDED FOR BATHING: TOTAL
HELP NEEDED FOR BATHING: TOTAL
PERSONAL GROOMING: A LITTLE
EATING MEALS: A LITTLE
DAILY ACTIVITIY SCORE: 14
DAILY ACTIVITIY SCORE: 12
TURNING FROM BACK TO SIDE WHILE IN FLAT BAD: A LOT
CLIMB 3 TO 5 STEPS WITH RAILING: TOTAL
TURNING FROM BACK TO SIDE WHILE IN FLAT BAD: A LOT
CLIMB 3 TO 5 STEPS WITH RAILING: TOTAL
PERSONAL GROOMING: A LOT
DRESSING REGULAR LOWER BODY CLOTHING: TOTAL
HELP NEEDED FOR BATHING: A LOT
PERSONAL GROOMING: TOTAL
EATING MEALS: A LOT
DRESSING REGULAR LOWER BODY CLOTHING: TOTAL
TOILETING: A LOT
TOILETING: A LOT
MOBILITY SCORE: 10
MOVING FROM LYING ON BACK TO SITTING ON SIDE OF FLAT BED WITH BEDRAILS: A LOT
MOVING TO AND FROM BED TO CHAIR: TOTAL
MOVING FROM LYING ON BACK TO SITTING ON SIDE OF FLAT BED WITH BEDRAILS: A LOT
PATIENT BASELINE BEDBOUND: NO
CLIMB 3 TO 5 STEPS WITH RAILING: TOTAL
TOILETING: A LOT
STANDING UP FROM CHAIR USING ARMS: A LOT
DRESSING REGULAR LOWER BODY CLOTHING: TOTAL
TURNING FROM BACK TO SIDE WHILE IN FLAT BAD: A LOT
CLIMB 3 TO 5 STEPS WITH RAILING: A LOT
WALKING IN HOSPITAL ROOM: TOTAL
HELP NEEDED FOR BATHING: TOTAL
STANDING UP FROM CHAIR USING ARMS: A LOT
MOVING TO AND FROM BED TO CHAIR: A LOT
MOBILITY SCORE: 8
DRESSING REGULAR UPPER BODY CLOTHING: TOTAL
DRESSING REGULAR LOWER BODY CLOTHING: A LOT
DRESSING REGULAR UPPER BODY CLOTHING: A LOT
TOILETING: TOTAL
MOVING FROM LYING ON BACK TO SITTING ON SIDE OF FLAT BED WITH BEDRAILS: A LOT
WALKING IN HOSPITAL ROOM: TOTAL
HELP NEEDED FOR BATHING: A LOT
CLIMB 3 TO 5 STEPS WITH RAILING: TOTAL
TOILETING: TOTAL
DRESSING REGULAR UPPER BODY CLOTHING: A LOT
TURNING FROM BACK TO SIDE WHILE IN FLAT BAD: A LOT
MOBILITY SCORE: 10
HELP NEEDED FOR BATHING: A LOT
DAILY ACTIVITIY SCORE: 8
DRESSING REGULAR UPPER BODY CLOTHING: A LITTLE
TOILETING: TOTAL
TOILETING: TOTAL
CLIMB 3 TO 5 STEPS WITH RAILING: TOTAL
TURNING FROM BACK TO SIDE WHILE IN FLAT BAD: A LOT
CLIMB 3 TO 5 STEPS WITH RAILING: TOTAL
DRESSING REGULAR UPPER BODY CLOTHING: TOTAL
CLIMB 3 TO 5 STEPS WITH RAILING: TOTAL
DRESSING REGULAR LOWER BODY CLOTHING: A LOT
WALKING IN HOSPITAL ROOM: A LOT
PERSONAL GROOMING: A LOT
HELP NEEDED FOR BATHING: A LOT

## 2024-01-01 ASSESSMENT — ENCOUNTER SYMPTOMS
FATIGUE: 1
GASTROINTESTINAL NEGATIVE: 1
PSYCHIATRIC NEGATIVE: 1
RESPIRATORY NEGATIVE: 1
CONSTITUTIONAL NEGATIVE: 1
MUSCULOSKELETAL NEGATIVE: 1
CARDIOVASCULAR NEGATIVE: 1
GASTROINTESTINAL NEGATIVE: 1
NERVOUS/ANXIOUS: 1
CARDIOVASCULAR NEGATIVE: 1
DIARRHEA: 1
CONSTITUTIONAL NEGATIVE: 1
CARDIOVASCULAR NEGATIVE: 1
MUSCULOSKELETAL NEGATIVE: 1
LOSS OF SENSATION IN FEET: 0
GASTROINTESTINAL NEGATIVE: 1
MUSCULOSKELETAL NEGATIVE: 1
PSYCHIATRIC NEGATIVE: 1
CONSTITUTIONAL NEGATIVE: 1
CARDIOVASCULAR NEGATIVE: 1
NEUROLOGICAL NEGATIVE: 1
CONSTITUTIONAL NEGATIVE: 1
PSYCHIATRIC NEGATIVE: 1
DYSURIA: 1
CONSTIPATION: 1
RESPIRATORY NEGATIVE: 1
RESPIRATORY NEGATIVE: 1
NAUSEA: 1
PSYCHIATRIC NEGATIVE: 1
PSYCHIATRIC NEGATIVE: 1
NEUROLOGICAL NEGATIVE: 1
NEUROLOGICAL NEGATIVE: 1
COUGH: 1
PSYCHIATRIC NEGATIVE: 1
PSYCHIATRIC NEGATIVE: 1
NAUSEA: 1
NEUROLOGICAL NEGATIVE: 1
PSYCHIATRIC NEGATIVE: 1
GASTROINTESTINAL NEGATIVE: 1
RESPIRATORY NEGATIVE: 1
COUGH: 1
WEAKNESS: 1
GASTROINTESTINAL NEGATIVE: 1
CONSTITUTIONAL NEGATIVE: 1
NEUROLOGICAL NEGATIVE: 1
RESPIRATORY NEGATIVE: 1
RESPIRATORY NEGATIVE: 1
NEUROLOGICAL NEGATIVE: 1
NEUROLOGICAL NEGATIVE: 1
RESPIRATORY NEGATIVE: 1
PSYCHIATRIC NEGATIVE: 1
NEUROLOGICAL NEGATIVE: 1
CARDIOVASCULAR NEGATIVE: 1
CONSTITUTIONAL NEGATIVE: 1
MUSCULOSKELETAL NEGATIVE: 1
CARDIOVASCULAR NEGATIVE: 1
NEUROLOGICAL NEGATIVE: 1
CARDIOVASCULAR NEGATIVE: 1
CONSTITUTIONAL NEGATIVE: 1
ABDOMINAL PAIN: 1
MUSCULOSKELETAL NEGATIVE: 1
MUSCULOSKELETAL NEGATIVE: 1
FREQUENCY: 1
CONSTITUTIONAL NEGATIVE: 1
CARDIOVASCULAR NEGATIVE: 1
CARDIOVASCULAR NEGATIVE: 1
CONSTITUTIONAL NEGATIVE: 1
CARDIOVASCULAR NEGATIVE: 1
DIARRHEA: 1
CONSTITUTIONAL NEGATIVE: 1
NEUROLOGICAL NEGATIVE: 1
OCCASIONAL FEELINGS OF UNSTEADINESS: 1
ABDOMINAL PAIN: 1
RESPIRATORY NEGATIVE: 1
GASTROINTESTINAL NEGATIVE: 1
CONSTITUTIONAL NEGATIVE: 1
MUSCULOSKELETAL NEGATIVE: 1
GASTROINTESTINAL NEGATIVE: 1
MUSCULOSKELETAL NEGATIVE: 1
MUSCULOSKELETAL NEGATIVE: 1
GASTROINTESTINAL NEGATIVE: 1
PSYCHIATRIC NEGATIVE: 1
CONSTITUTIONAL NEGATIVE: 1
DYSPHORIC MOOD: 1
RESPIRATORY NEGATIVE: 1
CARDIOVASCULAR NEGATIVE: 1
MUSCULOSKELETAL NEGATIVE: 1
CARDIOVASCULAR NEGATIVE: 1
CARDIOVASCULAR NEGATIVE: 1
DEPRESSION: 1
RESPIRATORY NEGATIVE: 1
MUSCULOSKELETAL NEGATIVE: 1
COUGH: 1
GASTROINTESTINAL NEGATIVE: 1
PSYCHIATRIC NEGATIVE: 1
PSYCHIATRIC NEGATIVE: 1
MUSCULOSKELETAL NEGATIVE: 1
NEUROLOGICAL NEGATIVE: 1
ABDOMINAL PAIN: 1
NEUROLOGICAL NEGATIVE: 1
CONSTIPATION: 1
MUSCULOSKELETAL NEGATIVE: 1

## 2024-01-01 ASSESSMENT — PAIN - FUNCTIONAL ASSESSMENT

## 2024-01-01 ASSESSMENT — PAIN DESCRIPTION - LOCATION
LOCATION: GENERALIZED
LOCATION: KNEE
LOCATION: FOOT
LOCATION: LEG
LOCATION: ANKLE
LOCATION: GENERALIZED
LOCATION: KNEE
LOCATION: FOOT

## 2024-01-01 ASSESSMENT — PAIN DESCRIPTION - ORIENTATION
ORIENTATION: RIGHT
ORIENTATION: LEFT
ORIENTATION: RIGHT

## 2024-01-01 ASSESSMENT — COLUMBIA-SUICIDE SEVERITY RATING SCALE - C-SSRS
1. IN THE PAST MONTH, HAVE YOU WISHED YOU WERE DEAD OR WISHED YOU COULD GO TO SLEEP AND NOT WAKE UP?: NO
2. HAVE YOU ACTUALLY HAD ANY THOUGHTS OF KILLING YOURSELF?: NO

## 2024-01-01 ASSESSMENT — PAIN SCALES - GENERAL
PAINLEVEL_OUTOF10: 0 - NO PAIN
PAINLEVEL_OUTOF10: 9
PAINLEVEL_OUTOF10: 2
PAINLEVEL_OUTOF10: 0 - NO PAIN
PAINLEVEL_OUTOF10: 0 - NO PAIN
PAINLEVEL_OUTOF10: 4
PAINLEVEL_OUTOF10: 0 - NO PAIN
PAINLEVEL_OUTOF10: 0 - NO PAIN
PAINLEVEL_OUTOF10: 4
PAINLEVEL_OUTOF10: 0 - NO PAIN
PAINLEVEL_OUTOF10: 2
PAINLEVEL_OUTOF10: 0 - NO PAIN
PAINLEVEL_OUTOF10: 0 - NO PAIN
PAINLEVEL_OUTOF10: 5 - MODERATE PAIN
PAINLEVEL: 0-NO PAIN
PAINLEVEL_OUTOF10: 1
PAINLEVEL_OUTOF10: 3
PAINLEVEL_OUTOF10: 4
PAINLEVEL_OUTOF10: 2
PAINLEVEL_OUTOF10: 4
PAINLEVEL_OUTOF10: 3
PAINLEVEL_OUTOF10: 4
PAINLEVEL_OUTOF10: 4

## 2024-01-01 ASSESSMENT — ACTIVITIES OF DAILY LIVING (ADL)
EFFECT OF PAIN ON DAILY ACTIVITIES: RESTLESSNESS
DRESSING YOURSELF: NEEDS ASSISTANCE
FEEDING YOURSELF: NEEDS ASSISTANCE
ADEQUATE_TO_COMPLETE_ADL: YES
ADEQUATE_TO_COMPLETE_ADL: YES
JUDGMENT_ADEQUATE_SAFELY_COMPLETE_DAILY_ACTIVITIES: YES
HOME_MANAGEMENT_TIME_ENTRY: 39
HOME_MANAGEMENT_TIME_ENTRY: 18
PATIENT'S MEMORY ADEQUATE TO SAFELY COMPLETE DAILY ACTIVITIES?: YES
WALKS IN HOME: DEPENDENT
HEARING - LEFT EAR: DIFFICULTY WITH NOISE
BATHING_ASSISTANCE: MAXIMAL
TOILETING: NEEDS ASSISTANCE
BATHING: NEEDS ASSISTANCE
GROOMING: NEEDS ASSISTANCE
LACK_OF_TRANSPORTATION: NO
ASSISTIVE_DEVICE: WHEELCHAIR
HOME_MANAGEMENT_TIME_ENTRY: 30
JUDGMENT_ADEQUATE_SAFELY_COMPLETE_DAILY_ACTIVITIES: YES
HOME_MANAGEMENT_TIME_ENTRY: 15
HEARING - RIGHT EAR: DIFFICULTY WITH NOISE
PATIENT'S MEMORY ADEQUATE TO SAFELY COMPLETE DAILY ACTIVITIES?: YES

## 2024-01-01 ASSESSMENT — PAIN DESCRIPTION - DESCRIPTORS
DESCRIPTORS: ACHING

## 2024-01-01 ASSESSMENT — LIFESTYLE VARIABLES
EVER HAD A DRINK FIRST THING IN THE MORNING TO STEADY YOUR NERVES TO GET RID OF A HANGOVER: NO
SKIP TO QUESTIONS 9-10: 1
HOW OFTEN DO YOU HAVE 6 OR MORE DRINKS ON ONE OCCASION: NEVER
HAVE YOU EVER FELT YOU SHOULD CUT DOWN ON YOUR DRINKING: NO
HAVE PEOPLE ANNOYED YOU BY CRITICIZING YOUR DRINKING: NO
AUDIT-C TOTAL SCORE: 0
EVER FELT BAD OR GUILTY ABOUT YOUR DRINKING: NO
AUDIT-C TOTAL SCORE: 0
HOW OFTEN DO YOU HAVE A DRINK CONTAINING ALCOHOL: NEVER
HOW MANY STANDARD DRINKS CONTAINING ALCOHOL DO YOU HAVE ON A TYPICAL DAY: PATIENT DOES NOT DRINK
SKIP TO QUESTIONS 9-10: 1
AUDIT-C TOTAL SCORE: 0

## 2024-01-01 ASSESSMENT — PATIENT HEALTH QUESTIONNAIRE - PHQ9
2. FEELING DOWN, DEPRESSED OR HOPELESS: SEVERAL DAYS
SUM OF ALL RESPONSES TO PHQ9 QUESTIONS 1 & 2: 2
1. LITTLE INTEREST OR PLEASURE IN DOING THINGS: SEVERAL DAYS

## 2024-01-01 ASSESSMENT — PAIN DESCRIPTION - PAIN TYPE: TYPE: ACUTE PAIN

## 2024-01-16 NOTE — PROGRESS NOTES
"Occupational Therapy    Occupational Therapy Evaluation    Name: Amari Boyer  MRN: [id number]  : 10/3/1930  Date: 2024  Visit #2    Time In: 1000a  Time Out: 1100a  Total Time: 60 mins    Assessment:  Pt appears to tolerate treatment well with no complaints of pain. FELIZ instructs pt to begin using lymphatic pump every other day for approximately 30 minutes, pt agreeable. Pt son states he will order velcro closing compression for pt. FELIZ instructs pt to complete 3-4 exercises from home exercise program per day, pt agreeable.       Plan:  Continue occupational therapy for lymphedema CDT for decreased swelling and increased ease of functional mobility tasks.    Subjective   Current Problem:  1. Stiffness of both ankle joints        2. Lymphedema of both lower extremities  Follow Up In Occupational Therapy      3. Weakness        4. Skin texture changes            General:   Pt states he skipped taking his water pills this morning because of coming to therapy.           Pain  When prompted pt states \"no more then the usual\"    Precautions:     H/o cellulitis, h/o CVA, left side weakness: Pacemaker left chest       Objective LE:       ROM: joint stiffness       Strength, left side weakness due to CVA; generalized weakness.       Sensation intact  Lower Extremity (Skin Appearance/Condition and Girth):   Dryness    Fibrotic edema    Pitting edema   Hemosiderin staining    Hyperpigmentation, red/purple/pink color   + Stemmer Sign    Additional Information: Full and firm tissue texture B lower legs   -feet involved   LLE lymphedema > RLE   LLE with purplish pigmentation   Redness and dry skin patch at RLE (sight of cellulitis previously; skin is not warm to the touch).  Pt reports swelling improves in AM, pt sleeps in bed with legs up.             Treatment:  Self Care 30  FELIZ educates pt on benefits of lymphatic pump use  - pt states he has not been using often, does not like using for an hour at a time. " FELIZ instructs pt to begin using pump every other day for 30 minutes at a time, pt agreeable. FELIZ instructs pt in MLD.  FELIZ educates pt and son on compression, velcro closing.      Man Ther 20  FELIZ provides MLD to L LE, good tissue texture softening post MLD.    Ther Ex 10  2x10 heel raises  2x10 toe raises  1x10 marches  1x10 quad extension  1x10 glute set  1x10 hip abduction  1x10 hip adduction    Home program updated, issued.

## 2024-01-23 NOTE — PROGRESS NOTES
"Occupational Therapy      Name: Amari Boyer  MRN: [id number]  : 10/3/1930  Date: 2024  Visit #3    Time In: 1200p  Time Out: 1255p  Total Time: 55 mins    Assessment:  Pt appears to tolerate treatment well with no complaints of pain. FELIZ instructs pt to continue using pumps. FELIZ reapplies tg fix at end of session. Decreased measurements from initial evaluation.      Plan:  Continue occupational therapy for lymphedema CDT for decreased swelling and increased ease of functional mobility tasks.    Subjective   Current Problem:    1. Stiffness of both ankle joints        2. Lymphedema of both lower extremities  Follow Up In Occupational Therapy      3. Weakness        4. Skin texture changes            General:   Pt states he used lymphatic pumps twice since last session, is unsure if taking it down to the 30 minutes as instructed is \"good enough\".           Pain  0/10    Precautions:     H/o cellulitis, h/o CVA, left side weakness: Pacemaker left chest       Objective LE:       ROM: joint stiffness       Strength, left side weakness due to CVA; generalized weakness.       Sensation intact  Lower Extremity (Skin Appearance/Condition and Girth):   Dryness    Fibrotic edema    Pitting edema   Hemosiderin staining    Hyperpigmentation, red/purple/pink color   + Stemmer Sign    Additional Information: Full and firm tissue texture B lower legs   -feet involved   LLE lymphedema > RLE   LLE with purplish pigmentation   Redness and dry skin patch at RLE (sight of cellulitis previously; skin is not warm to the touch).  Pt reports swelling improves in AM, pt sleeps in bed with legs up.             Treatment:  Self Care 15  FELIZ assesses skin, takes measurements B LE.    BLE measurements  R LE   SBP: 43.1  10cm below SBP: 36.7  20cm below SBP: 36.5  30cm below SBP: 30.0  35cm below SBP: 27.1  Ankle: 24.3  Ankle lobule: 27.5  Forefoot: 27.1    LLE   SBP: 43.0  10cm below SBP: 37.2  20cm below SBP: 36.0  30cm below " SBP: 33.2  35cm below SBP: 33.3  Ankle: 33.5  Ankle lobule: 34.6  Forefoot: 29.2    Man Ther 40  FELIZ provides MLD to L LE, good tissue texture softening post MLD.

## 2024-02-02 NOTE — TELEPHONE ENCOUNTER
[facility] pt has been complaining of shortness of breath. Pulse ox was 97 today. They are requesting O2 order PRN  Phone [phone]  Fax- [phone]

## 2024-02-02 NOTE — PROGRESS NOTES
"Occupational Therapy      Name: Amari Boyer  MRN: [id number]  : 10/3/1930  Date: 2024  Visit #3    Time In: 1200p  Time Out: 1255p  Total Time: 55 mins    Assessment:  OT fabricated 1/2\" foam swell spot for dorsum of foot.  OT educated pt to trial at home. Will further assess.      Plan:  Continue occupational therapy for lymphedema CDT for decreased swelling and increased ease of functional mobility tasks.    Subjective   Current Problem:    1. Weakness        2. Lymphedema of both lower extremities  Follow Up In Occupational Therapy      3. Skin texture changes        4. Stiffness of both ankle joints            General:   Patient reports he hit his leg against furniture and it is tender         Pain  0/10    Precautions:     H/o cellulitis, h/o CVA, left side weakness: Pacemaker left chest       Objective LE:       ROM: joint stiffness       Strength, left side weakness due to CVA; generalized weakness.       Sensation intact  Lower Extremity (Skin Appearance/Condition and Girth):   Dryness    Fibrotic edema    Pitting edema   Hemosiderin staining    Hyperpigmentation, red/purple/pink color   + Stemmer Sign    Additional Information: Full and firm tissue texture B lower legs   -feet involved   LLE lymphedema > RLE   LLE with purplish pigmentation   Redness and dry skin patch at RLE (sight of cellulitis previously; skin is not warm to the touch).  Pt reports swelling improves in AM, pt sleeps in bed with legs up.             Treatment:    Self Care 18    OT assessed skin    OT educated patient on importance of lymphapress at home for management of lymphedema.  OT educated pt to utilize pump regularly; recommended even 30 minutes a day.  Pt expressed understanding and plans to use pump more.     OT provided patient with 1/2\" foam swell spot for dorsum of foot.  OT educated pt to secure swell spot within TG  applied to L foot to address foot swelling.  Pt unable to don shoes with swell spot applied " to L foot.  OT educated pt to trial at home    Man Ther 40    OT initiated MLD sequence with opening of lymph nodes to promote lymphatic circulation.   OT provided MLD to B trunk and BLEs.  Softened tissue texture noted post tx.

## 2024-02-07 NOTE — PROGRESS NOTES
Occupational Therapy      Name: Amari Boyer  MRN: [id number]  : 10/3/1930  Date: 2024  Visit #4    Assessment:  Patient's son ordered patient a velcro compression garment.  Will further assess fit and tolerance of garment.    Plan:  Continue occupational therapy for lymphedema CDT for decreased swelling and increased ease of functional mobility tasks.    Subjective   Current Problem:    1. Skin texture changes        2. Lymphedema of both lower extremities  Follow Up In Occupational Therapy      3. Weakness        4. Stiffness of both ankle joints            General:   Patient reports that he has used his lymphapress 3 days in a row     Pain  0/10    Precautions:     H/o cellulitis, h/o CVA, left side weakness: Pacemaker left chest       Objective LE:       ROM: joint stiffness       Strength, left side weakness due to CVA; generalized weakness.       Sensation intact  Lower Extremity (Skin Appearance/Condition and Girth):   Dryness    Fibrotic edema    Pitting edema   Hemosiderin staining    Hyperpigmentation, red/purple/pink color   + Stemmer Sign    Additional Information: Full and firm tissue texture B lower legs   -feet involved   LLE lymphedema > RLE   LLE with purplish pigmentation   Redness and dry skin patch at RLE (sight of cellulitis previously; skin is not warm to the touch).  Pt reports swelling improves in AM, pt sleeps in bed with legs up.             Treatment:    Self Care 39    OT assessed skin    OT measured patient for velcro compression.  Patient noted to be between sizes.  OT donned Ready Wrap sample garment, which is size L.  Garment noted to fit patient's left leg appropriately.   OT educated patient and patient's son on how to don garment and wear schedule with garment.  Pt's son expressed carryover   Pt's son ordered garment while pt in treatment; scheduled to be delivered on Friday.    -Will further assess      Man Ther 20    OT provided Manual Lymph Drainage to LLE  -softened  tissue texture post treatment

## 2024-02-13 NOTE — PROGRESS NOTES
Chief Complaint:   No chief complaint on file.     History Of Present Illness:    Amari Boyer is a 89 y.o. male with a history of CAD s/p CABG (3 vessel in the past with LIMA-LAD, VG-OM, and VG-RCA) with known occluded VG-RCA, dyslipidemia, hypertension, lymphedema, sick sinus syndrome status post pacer in situ, chronic diastolic heart failure, and atrial fibrillation here for routine follow-up.     Overall the use of his isosorbide mononitrate is decrease his episodes of chest pain significantly.  His left leg swelling has improved with the use of a compression wrap on the leg but his foot is still swollen.  His right leg is much better.    Is complaining of constipation.  Use Metamucil but then had loose stools.  Then needed to use Imodium however developed problems with passing his bowels.     Echocardiogram 7/10/2021: Mildly depressed LV function. Grade 1 diastolic dysfunction. Aortic valve sclerosis with mild AR.     Echocardiogram 1/9/19 demonstrated normal LV size and function, EF 55-60%. Normal RV size and function. Mild MR, TR, and GA. Trivial pericardial effusion with evidence of a pericardial fat pad and no evidence of tamponade physiology.     PCI of the VG-OM 7/10/17. Catheterization performed for unstable angina. VG was stented with a Resolute 2.75 x 18 mm ETHAN in the proximal VG and a Resolute 2.5 x 12 mm ETHAN to the mid VG.  Of note LIMA to LAD was patent and vein graft to RCA was occluded.     Carotid duplex 5/26/17 demonstrating less than 50% stenosis bilaterally.     Lexiscan nuclear stress test December 2016 demonstrating no evidence of ischemia or scar.     Echo 9/18/15 with EF 50-55%. Abnormal septal motion consistent with postthoracotomy state. 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, subcortical (CMS/HCC), Other injury of unspecified body region, initial encounter, Peripheral vascular angioplasty status, Personal history of other diseases of the circulatory system (03/05/2015), Personal history of other diseases of the circulatory system (12/03/2015), Personal history of other diseases of the circulatory system (04/26/2016), Personal history of other endocrine, nutritional and metabolic disease (09/10/2019), Personal history of other malignant neoplasm of skin, Personal history of other medical treatment, Personal history of other medical treatment, Personal history of other specified conditions (07/27/2021), and Personal history of pneumonia (recurrent).    Past Surgical History:  He has a past surgical history that includes Cataract extraction (11/07/2014); Other surgical history (07/27/2022); Other surgical history (07/27/2022); Other surgical history (03/22/2021); Other surgical history (03/22/2021); Other surgical history (03/22/2021); Other surgical history (03/22/2021); Other surgical history (03/22/2021); Other surgical history (03/22/2021); Other surgical history (03/22/2021); Other surgical history (03/22/2021); Other surgical history (03/22/2021); Other surgical history (03/22/2021); Other surgical history (03/22/2021); Other surgical history (03/22/2021); Other surgical history (03/22/2021); Other surgical history (03/22/2021); Coronary artery bypass graft (04/26/2016); Other surgical history (04/26/2016); CT angio head w and wo IV contrast (7/9/2021); and CT angio neck (7/9/2021).      Social History:  He reports that he quit smoking about 51 years ago. His smoking use included cigarettes. He has never used smokeless tobacco. He reports that he does not currently use alcohol. He reports  "that he does not currently use drugs.    Family History:  Family History   Problem Relation Name Age of Onset    Coronary artery disease Mother      Heart attack Mother      Other (ICD in place) Mother      Coronary artery disease Father      Heart attack Father      Other (ICD in place) Father      Coronary artery disease Sister      Coronary artery disease Brother      Heart attack Brother          Allergies:  Atorvastatin, Rosuvastatin, Simvastatin, and Ranolazine    Outpatient Medications:  Current Outpatient Medications   Medication Instructions    apixaban (Eliquis) 2.5 mg tablet 1 tablet, oral, 2 times daily    docusate sodium (Colace) 100 mg capsule oral    erythromycin (Romycin) 5 mg/gram (0.5 %) ophthalmic ointment APPLY 1/4 INCH INTO BOTH LOWER LIDS TWICE DAILY    furosemide (LASIX) 20 mg, oral, Daily PRN    isosorbide mononitrate ER (Imdur) 60 mg 24 hr tablet 1 tablet, oral, 2 times daily    levothyroxine (SYNTHROID, LEVOXYL) 75 mcg, oral, Daily    metoprolol tartrate (LOPRESSOR) 50 mg, oral, 2 times daily, Take with food.    neomycin-polymyxin B-dexameth (Polydex) 3.5 mg/g-10,000 unit/g-0.1 % ointment ophthalmic ointment APPLY 1 SMALL AMOUNT TO BOTH LOWER LIDS EVERY EVENING    nitroglycerin (NITROSTAT) 0.4 mg, sublingual, Every 5 min PRN    vit C,G-Uz-rrsze-lutein-zeaxan (PreserVision AREDS-2) 250-90-40-1 mg capsule oral, 2 times daily       Last Recorded Vitals:  Visit Vitals  /80 (BP Location: Left arm, Patient Position: Sitting)   Pulse 65   Ht 1.753 m (5' 9\")   Wt 82.6 kg (182 lb)   SpO2 96%   BMI 26.88 kg/m²   Smoking Status Former   BSA 2.01 m²      LASTWT(3):   Wt Readings from Last 3 Encounters:   02/13/24 82.6 kg (182 lb)   12/13/23 85.3 kg (188 lb)   11/14/23 81.6 kg (180 lb)       Physical Exam:  HEENT: JVP is normal.   Pulmonary: Clear to auscultation bilaterally.  Cardiovascular: S1,S2, regular. No appreciable murmurs, rubs or gallops.   Lower extremities: Warm. Trace distal pulses.  " No significant edema on the right and 1+ on the left      Last Labs:  CBC -  Recent Labs     12/08/23  1219 04/27/23  1408 09/30/22  1409   WBC 5.6 5.0 4.9   HGB 10.5* 10.0* 9.7*   HCT 34.0* 32.4* 32.0*    176 166   * 104* 105*       CMP -  Recent Labs     12/08/23  1219 04/27/23  1408 09/30/22  1409 07/11/21  0504 07/10/21  0514 07/09/21  1225 07/02/21  0517 09/08/20  0610 09/07/20  1054    140 141   < > 139   < > 141   < > 138   K 4.9 4.7 4.6   < > 3.9   < > 4.1   < > 4.6   * 108* 109*   < > 108*   < > 110*   < > 108*   CO2 25 26 24   < > 23   < > 22   < > 20*   ANIONGAP 12 11 13   < > 12   < > 13   < > 15   BUN 34* 28* 33*   < > 28*   < > 54*   < > 19   CREATININE 2.14* 1.84* 1.71*   < > 1.62*   < > 1.52*   < > 1.39*   EGFR 28*  --   --   --   --   --   --   --   --    MG  --   --   --   --  2.10  --  1.80  --  1.73    < > = values in this interval not displayed.     Recent Labs     12/08/23  1219 04/27/23  1408 05/02/22  1511 07/10/21  0514 07/09/21  1225   ALBUMIN 3.6 3.8 3.8   < > 3.6   ALKPHOS 93 78  --   --  82   ALT 13 16  --   --  8*   AST 17 22  --   --  15   BILITOT 0.4 0.4  --   --  0.4    < > = values in this interval not displayed.       LIPID PANEL -   Recent Labs     04/27/23  1408 07/10/21  0514 11/30/20  1012   CHOL 138 137 153   LDLF 67 79 95   HDL 51.3 34.0* 42.0   TRIG 101 122 82       Recent Labs     12/08/23  1219 04/27/23  1408 09/30/22  1409 07/10/21  0514 07/09/21  1225   BNP  --   --   --   --  775*   HGBA1C 5.9* 6.0* 5.8*   < >  --     < > = values in this interval not displayed.           Assessment/Plan   1) chronic stable angina: Chest pain has significantly improved.  No additional workup needed at this time     2) CAD: CABG 1993 with LIMA-LAD, VG-OM and VG-RCA. Known occluded VG-RCA. PCI of the VG-OM 7/10/17.  As noted above the patient would like to avoid invasive strategy.  We will continue with medical therapy.     3) dyslipidemia: Has had several side  effects with several statins. LDL controlled.     4) atrial fibrillation: Elevated CHADS-VASc score. Continue Eliquis (renally dosed)     5) pacemaker in situ: Continue to follow with Dr. Lowry     6) chronic anemia: Hemoglobin stable.     7) lymphedema: Asked him to get compression wrap for the ankle on the left side as well.     8) follow-up: 6 mo or sooner if needed.       Nikko Jarrell MD

## 2024-03-08 NOTE — TELEPHONE ENCOUNTER
Pt was swabbed for COVID - negative, [facility] is calling stating they believe he has the flu. They are wondering if you can send in something for him to be treated. Can you send that to [facility] fax [phone]. Please advise.    [phone] - ask for [facility].

## 2024-03-12 NOTE — TELEPHONE ENCOUNTER
Pt is asking if you will send in flomax for him. ( Aware you are out and will call when you return )  
None

## 2024-03-17 PROBLEM — M25.561 ACUTE PAIN OF RIGHT KNEE: Status: ACTIVE | Noted: 2024-01-01

## 2024-03-17 NOTE — ED TRIAGE NOTES
"RN to bedside. Pt presents to this ED from [facility] via [facility]  w/ chief complaint of  failure to thrive. Pt is having some R knee pain and swelling, but has become increasingly more weak for the past several days and experiencing no appetite; pt states he's eaten very little and is \"just wore out\". Pt's pain concern is the R knee pain (has history of lymphedema), but family on scene is very concerned about the lack of energy/appetite/thirst. Pt resting peacefully and denies needs or pain at this time. Resps even and unlabored, skin p/w/d, NAD. Cardiac and hemodynamic monitoring applied. Call light w/in reach.    "

## 2024-03-17 NOTE — ED NOTES
This nurse reached out to Dr. Gottlieb about new onset blood culture orders after two antibiotics. This nurse confirmed with ER attending Dr. Ramos prior to antibiotic administration due to no orders because patient did not meet SIRS criteria.      Elizabeth Jaramillo LPN  03/17/24 1959

## 2024-03-17 NOTE — ED PROVIDER NOTES
EMERGENCY DEPARTMENT ENCOUNTER      Pt Name: Amari Boyer  MRN: [id number]  Birthdate 10/3/1930  Date of evaluation: 3/17/2024  Provider: Ryan Oleary MD    CHIEF COMPLAINT       Chief Complaint   Patient presents with    Failure To Thrive    Knee Pain         HISTORY OF PRESENT ILLNESS    The patient is a 89-year-old male who comes to emergency department due to right knee pain and congestion.  The patient states that his right knee pain started yesterday night with no significant trauma.  The patient says that when he stands, the right knee pain is a 10 out of 10 in severity.  The patient states that he has no knee pain when lying down or if he does not apply pressure to the knee.  The patient walks with a walker at baseline.  The patient also has been having flulike symptoms for the past 9 days in which she has been taking Tamiflu for 5 days.  The patient's flulike symptoms have persisted up to today.  The patient's accompanying relative says that her brother is a nurse to examine the patient and said that the patient was congested.  The patient has an extensive past medical history which includes stroke, atrial fibrillation, CHF, and CKD.  Patient has had open heart surgery in the distant past.  The patient is on Eliquis and metoprolol among other medications.  The patient is allergic to statin drugs.  The patient denies the use of tobacco or alcohol.  The patient denies any fevers, chills, vision changes, sore throat, chest pain, shortness of breath, abdominal pain, nausea, vomiting, or headache.      History provided by:  Patient and relative   used: No        Nursing Notes were reviewed.    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, subcortical (CMS/HCC)     Thalamic hemorrhage    Other injury of unspecified body region, initial encounter     Hematoma    Peripheral vascular angioplasty status     History of angioplasty    Personal history of other diseases of the circulatory system 03/05/2015    History of angina    Personal history of other diseases of the circulatory system 12/03/2015    History of angina pectoris    Personal history of other diseases of the circulatory system 04/26/2016    History of sick sinus syndrome    Personal history of other endocrine, nutritional and metabolic disease 09/10/2019    History of hyperlipidemia    Personal history of other malignant neoplasm of skin     History of malignant neoplasm of skin    Personal history of other medical treatment     H/O exercise stress test    Personal history of other medical treatment     H/O diagnostic ultrasound    Personal history of other specified conditions 07/27/2021    History of epistaxis    Personal history of pneumonia (recurrent)     History of pneumonia         SURGICAL HISTORY       Past Surgical History:   Procedure Laterality Date    CATARACT EXTRACTION  11/07/2014    Cataract Surgery    CORONARY ARTERY BYPASS GRAFT  04/26/2016    CABG    CT ANGIO NECK  7/9/2021    CT NECK ANGIO W AND WO IV CONTRAST 7/9/2021 [facility]    CT HEAD ANGIO W AND WO IV CONTRAST  7/9/2021    CT HEAD ANGIO W AND WO IV CONTRAST 7/9/2021 [facility] CL[facility]    OTHER SURGICAL HISTORY  07/27/2022    Percutaneous transluminal coronary angioplasty    OTHER SURGICAL HISTORY  07/27/2022    Arterial stent placement    OTHER SURGICAL HISTORY  03/22/2021    Umbilical hernia repair    OTHER SURGICAL HISTORY  03/22/2021    Bypass    OTHER SURGICAL HISTORY  03/22/2021    Colonoscopy    OTHER SURGICAL HISTORY  03/22/2021    Circumcision    OTHER SURGICAL HISTORY  03/22/2021    Nose surgery    OTHER SURGICAL HISTORY   03/22/2021    Hip replacement    OTHER SURGICAL HISTORY  03/22/2021    Pacemaker insertion    OTHER SURGICAL HISTORY  03/22/2021    Endoscopy    OTHER SURGICAL HISTORY  03/22/2021    Excision of basal cell carcinoma    OTHER SURGICAL HISTORY  03/22/2021    Eye surgery    OTHER SURGICAL HISTORY  03/22/2021    Rhinoplasty    OTHER SURGICAL HISTORY  03/22/2021    Leg surgery    OTHER SURGICAL HISTORY  03/22/2021    Coronary artery stent placement    OTHER SURGICAL HISTORY  03/22/2021    Mohs surgery nose    OTHER SURGICAL HISTORY  04/26/2016    Permanent Pacemaker Type Dual-Chamber         CURRENT MEDICATIONS       Previous Medications    APIXABAN (ELIQUIS) 2.5 MG TABLET    Take 1 tablet (2.5 mg) by mouth 2 times a day.    DOCUSATE SODIUM (COLACE) 100 MG CAPSULE    Take by mouth.    ERYTHROMYCIN (ROMYCIN) 5 MG/GRAM (0.5 %) OPHTHALMIC OINTMENT    APPLY 1/4 INCH INTO BOTH LOWER LIDS TWICE DAILY    FUROSEMIDE (LASIX) 20 MG TABLET    Take 1 tablet (20 mg) by mouth once daily as needed (swelling).    ISOSORBIDE MONONITRATE ER (IMDUR) 60 MG 24 HR TABLET    Take 1 tablet (60 mg) by mouth 2 times a day.    LEVOTHYROXINE (SYNTHROID, LEVOXYL) 75 MCG TABLET    Take 1 tablet (75 mcg) by mouth once daily.    METOPROLOL TARTRATE (LOPRESSOR) 50 MG TABLET    Take 1 tablet by mouth 2 times a day. Take with food.    NEOMYCIN-POLYMYXIN B-DEXAMETH (POLYDEX) 3.5 MG/G-10,000 UNIT/G-0.1 % OINTMENT OPHTHALMIC OINTMENT    APPLY 1 SMALL AMOUNT TO BOTH LOWER LIDS EVERY EVENING    NITROGLYCERIN (NITROSTAT) 0.4 MG SL TABLET    Place 1 tablet (0.4 mg) under the tongue every 5 minutes if needed for chest pain.    TAMSULOSIN (FLOMAX) 0.4 MG 24 HR CAPSULE    Take 1 capsule (0.4 mg) by mouth once daily.    VIT C,S-VX-NHHZO-LUTEIN-ZEAXAN (PRESERVISION AREDS-2) 250-90-40-1 MG CAPSULE    Take by mouth twice a day.       ALLERGIES     Atorvastatin, Rosuvastatin, Simvastatin, and Ranolazine    FAMILY HISTORY       Family History   Problem Relation Name Age  of Onset    Coronary artery disease Mother      Heart attack Mother      Other (ICD in place) Mother      Coronary artery disease Father      Heart attack Father      Other (ICD in place) Father      Coronary artery disease Sister      Coronary artery disease Brother      Heart attack Brother            SOCIAL HISTORY       Social History     Socioeconomic History    Marital status:      Spouse name: Not on file    Number of children: Not on file    Years of education: Not on file    Highest education level: Not on file   Occupational History    Not on file   Tobacco Use    Smoking status: Former     Types: Cigarettes     Quit date: 1973     Years since quittin.2    Smokeless tobacco: Never   Vaping Use    Vaping Use: Never used   Substance and Sexual Activity    Alcohol use: Not Currently    Drug use: Not Currently    Sexual activity: Defer   Other Topics Concern    Not on file   Social History Narrative    Not on file     Social Determinants of Health     Financial Resource Strain: Not on file   Food Insecurity: Not on file   Transportation Needs: Not on file   Physical Activity: Not on file   Stress: Not on file   Social Connections: Not on file   Intimate Partner Violence: Not on file   Housing Stability: Not on file       SCREENINGS                        PHYSICAL EXAM    (up to 7 for level 4, 8 or more for level 5)     ED Triage Vitals [24 1526]   Temperature Heart Rate Respirations BP   36.5 °C (97.7 °F) 65 18 168/87      Pulse Ox Temp Source Heart Rate Source Patient Position   95 % Temporal -- Lying      BP Location FiO2 (%)     -- --       Physical Exam  Constitutional:       General: He is not in acute distress.     Appearance: He is not toxic-appearing or diaphoretic.   HENT:      Head: Normocephalic and atraumatic.      Nose: Congestion and rhinorrhea present.      Comments: The patient has had flu for the past 9 days and has congestion and rhinorrhea most likely from the flu.  Eyes:       General: No scleral icterus.        Right eye: No discharge.         Left eye: No discharge.      Extraocular Movements: Extraocular movements intact.      Conjunctiva/sclera: Conjunctivae normal.   Cardiovascular:      Rate and Rhythm: Normal rate and regular rhythm.      Pulses: Normal pulses.      Heart sounds: Normal heart sounds. No murmur heard.     No friction rub. No gallop.   Pulmonary:      Effort: Pulmonary effort is normal. No respiratory distress.      Breath sounds: No stridor. Wheezing (Bilateral wheezes diffusely are present.) present. No rhonchi or rales.   Chest:      Chest wall: No tenderness.   Abdominal:      General: There is no distension.      Palpations: Abdomen is soft.      Tenderness: There is no abdominal tenderness. There is no guarding or rebound.   Musculoskeletal:         General: Swelling, tenderness and deformity present. No signs of injury.      Cervical back: Normal range of motion and neck supple. No rigidity or tenderness.      Right lower leg: Edema present.      Left lower leg: Edema present.      Comments: Patient has a warm and swollen right knee that was first noticed yesterday night without any memorable trauma.  Though right knee cannot be moved without significant pain.  There is significant warmth and enlargement in the right knee that is not present in the left knee.  Left knee appears normal.  The patient also has bilateral edema of the lower extremities.   Lymphadenopathy:      Cervical: No cervical adenopathy.   Skin:     General: Skin is warm and dry.      Coloration: Skin is not jaundiced or pale.      Findings: Erythema (See musculoskeletal above.) present. No bruising, lesion or rash.   Neurological:      Mental Status: He is alert.   Psychiatric:         Mood and Affect: Mood normal.         Behavior: Behavior normal.         Thought Content: Thought content normal.         Judgment: Judgment normal.        DIAGNOSTIC RESULTS     LABS:  Labs Reviewed    CBC - Abnormal       Result Value    WBC 8.7      nRBC 0.0      RBC 3.86 (*)     Hemoglobin 12.0 (*)     Hematocrit 38.7 (*)           MCH 31.1      MCHC 31.0 (*)     RDW 13.3      Platelets 238     SEDIMENTATION RATE, AUTOMATED - Abnormal    Sedimentation Rate 29 (*)    COMPREHENSIVE METABOLIC PANEL   B-TYPE NATRIURETIC PEPTIDE   TROPONIN I, HIGH SENSITIVITY   C-REACTIVE PROTEIN       All other labs were within normal range or not returned as of this dictation.    Imaging  XR knee right 4+ views    (Results Pending)   XR chest 1 view    (Results Pending)        Procedures  Procedures     EMERGENCY DEPARTMENT COURSE/MDM:     ED Course as of 03/17/24 2031   Sun Mar 17, 2024   1908 Pt is confirmed to be DNRCC. Discussed with both daughters and son at bedside. Discussed the merits of pursuing a diagnosis of septic joint, they do not want me to perform an arthocentesis.  [JK]      ED Course User Index  [JK] Kiel Powers, DO         Diagnoses as of 03/17/24 2031   Acute pain of right knee   Pneumonia of both lower lobes due to infectious organism        Medical Decision Making  The patient received a cardiac workup due to the possibility of a cardiogenic cause for the patient's thoracic congestion.  The patient's ECG did not show any significant abnormalities.  Patient's troponin was mildly elevated at 29, indicating possible cardiac stress.  The patient's chest x-ray showed that the patient had opacities in the bilateral lungs consistent with pleural effusion and possible pneumonia.  The patient's CBC did not show any significant abnormalities.  The patient's CMP did not show any significant abnormalities.  Patient received inflammatory marker studies such as C-reactive protein, and sedimentation rate to look for signs of inflammation that might be consistent with an infection.  The patient's C-reactive protein teen and sedimentation rate were elevated indicating systemic inflammation.  The patient  received a BNP study to look for signs of congestive heart failure as the patient has a history of congestive heart failure.  The patient's BNP was elevated from his baseline 2 years ago indicating possible CHF exacerbation.  The patient was put on Rocephin and Zithromax here in the emergency department due to the presumed pneumonia.  Zithromax was used to cover atypical organisms that may cause pneumonia.  The patient received a x-ray of the knee on the right to look for signs of knee abnormalities as the patient had significant warmth, erythema, and deformity of the right knee.  Knee x-ray of the right knee did not show any significant abnormalities except some osteoarthritis and small amount of fluid.  The patient was admitted to general medicine for further management due to his new onset lung findings and CHF exacerbation.    Patient and or family in agreement and understanding of treatment plan.  All questions answered.      I reviewed the case with the attending ED physician. The attending ED physician agrees with the plan. Patient and/or patient´s representative was counseled regarding labs, imaging, likely diagnosis, and plan. All questions were answered.    ED Medications administered this visit:  Medications - No data to display    New Prescriptions from this visit:    New Prescriptions    No medications on file       Follow-up:  No follow-up provider specified.      Final Impression: No diagnosis found.      (Please note that portions of this note were completed with a voice recognition program.  Efforts were made to edit the dictations but occasionally words are mis-transcribed.)     Ryan Oleary MD  Resident  03/17/24 2048

## 2024-03-17 NOTE — H&P
History Of Present Illness  Amari Boyer is a 89 y.o. male with significant medical history of A fib- eliquis, CAD s/p CABG 1993 with LIMA-LAD, VG-OM and VG-RCA, [employer] DDDR pacemakers in 2000, 2005, 2013, and most recently in November 2020, PCI 4/2010 OM with ETHAN, HFpEF, COPD, hypothyroidism, CKD stage 3b, Hx of CVA w/ residual L side weakness, T2DM- insulin independent, lymphedema  presenting with generalized weakness and right-sided knee pain. Patient accompanied by family to provide additional history.  Per family patient's baseline is that he is normally fairly independent only requiring assistance with getting dressed in the morning and at night, and requires some supervision during showers otherwise requires no other assistance.  He had the flu 10 days ago and was treated with a 5-day course of Tamiflu but reports that he has not been feeling like his generalized weakness has been improving and in fact yesterday he reports that he had excruciating knee pain causing him to be unable to move his knee regardless of weightbearing status, normally he is able to bear weight on that knee. No history of septic joint or knee replacements.  He does have a history of lymphedema after his CVA but reports that normally his right leg is stronger.  Normally ambulates via walker or scooter.  He stopped taking his Lasix 1 month ago was he reported that it was becoming difficult for him to make it to the bathroom in time.  Reports that he wears depends diapers and that they do irritate his skin.  Reports that he is more congested and has green sputum production when he coughs.     Denies fevers, lightheadedness, headaches, dizziness, vision changes, sore throat, runny nose, CP, SOB, palpitations, diarrhea, abd pain, black/bloody stools, dysuria, polyuria, hematuria.    CODE STATUS: DNR/DNI    ED course:  Initial Vitals: Temperature 36.5, Pulse 65, RR 18, /87, O2 95 on RA  Labs: Potassium 3.7, creatinine  1.70, CRP 6.51, BNP 1202, troponin 29, white count eight 8.7, hemoglobin 12.0, platelets 238, ESR 29  Imaging: Knee x-ray shows mild medial and patellofemoral compartment osteoarthrosis, small knee joint effusion and medial knee which is nonspecific.  Chest x-ray shows patchy opacities of the right lower lung, small bibasilar pleural effusions right greater than left.    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, subcortical (CMS/HCC)     Thalamic hemorrhage    Other injury of unspecified body region, initial encounter     Hematoma    Peripheral vascular angioplasty status     History of angioplasty    Personal history of other diseases of the circulatory system 03/05/2015    History of angina    Personal history of other diseases of the circulatory system 12/03/2015    History of angina pectoris    Personal history of other diseases of the circulatory system 04/26/2016    History of sick sinus syndrome    Personal history of other endocrine, nutritional and metabolic disease 09/10/2019    History of hyperlipidemia    Personal history of other malignant neoplasm of skin     History of malignant neoplasm of skin    Personal history of other medical treatment     H/O exercise stress test    Personal history of other medical treatment     H/O diagnostic ultrasound    Personal history of other specified conditions 07/27/2021    History of epistaxis    Personal history of pneumonia (recurrent)     History of pneumonia       Surgical History  Past Surgical History:   Procedure Laterality Date    CATARACT EXTRACTION  11/07/2014    Cataract  Surgery    CORONARY ARTERY BYPASS GRAFT  04/26/2016    CABG    CT ANGIO NECK  7/9/2021    CT NECK ANGIO W AND WO IV CONTRAST 7/9/2021 [facility]ICAL [facility]    CT HEAD ANGIO W AND WO IV CONTRAST  7/9/2021    CT HEAD ANGIO W AND WO IV CONTRAST 7/9/2021 [facility] CLINICAL LEGACY    OTHER SURGICAL HISTORY  07/27/2022    Percutaneous transluminal coronary angioplasty    OTHER SURGICAL HISTORY  07/27/2022    Arterial stent placement    OTHER SURGICAL HISTORY  03/22/2021    Umbilical hernia repair    OTHER SURGICAL HISTORY  03/22/2021    Bypass    OTHER SURGICAL HISTORY  03/22/2021    Colonoscopy    OTHER SURGICAL HISTORY  03/22/2021    Circumcision    OTHER SURGICAL HISTORY  03/22/2021    Nose surgery    OTHER SURGICAL HISTORY  03/22/2021    Hip replacement    OTHER SURGICAL HISTORY  03/22/2021    Pacemaker insertion    OTHER SURGICAL HISTORY  03/22/2021    Endoscopy    OTHER SURGICAL HISTORY  03/22/2021    Excision of basal cell carcinoma    OTHER SURGICAL HISTORY  03/22/2021    Eye surgery    OTHER SURGICAL HISTORY  03/22/2021    Rhinoplasty    OTHER SURGICAL HISTORY  03/22/2021    Leg surgery    OTHER SURGICAL HISTORY  03/22/2021    Coronary artery stent placement    OTHER SURGICAL HISTORY  03/22/2021    Mohs surgery nose    OTHER SURGICAL HISTORY  04/26/2016    Permanent Pacemaker Type Dual-Chamber        Social History  He reports that he quit smoking about 51 years ago. His smoking use included cigarettes. He has never used smokeless tobacco. He reports that he does not currently use alcohol. He reports that he does not currently use drugs.    Family History  Family History   Problem Relation Name Age of Onset    Coronary artery disease Mother      Heart attack Mother      Other (ICD in place) Mother      Coronary artery disease Father      Heart attack Father      Other (ICD in place) Father      Coronary artery disease Sister      Coronary artery disease Brother      Heart attack Brother         "  Allergies  Atorvastatin, Rosuvastatin, Simvastatin, and Ranolazine    12-system ROS negative except as documented in HPI     Physical Exam    Constitutional: Well developed, awake/alert/oriented to person, birthdate, year, location, no distress, alert and cooperative, nontoxic  HEENT: anicteric sclera, eomi, no oral lesions noted  Cardiovascular: Regular, rate and rhythm, no murmurs, 2+ equal pulses of the extremities, normal S1 and S2  Respiratory/Thorax: Patent airways, bibasilar rhonchi with scattered wheezes on expiration, good chest expansion, thorax symmetric, no conversational dyspnea  Gastrointestinal: +BS, Nondistended, soft, non-tender, no rebound tenderness or guarding  Musculoskeletal: ROM intact, R knee joint swelling, no apparent joint line tenderness, lateral R knee tenderness to palpation with tenderness to fluctuance in area,  Extremities: normal extremities, bilateral nonpitting edema to mid calf  Skin: intertriginous rash in inguinal folds bilaterally  Neurological: alert and oriented x3, intact senses, follows commands, clear speech, no facial droop    Last Recorded Vitals  Blood pressure 147/83, pulse 67, temperature 36.5 °C (97.7 °F), temperature source Temporal, resp. rate 18, height 1.753 m (5' 9\"), weight 81.6 kg (180 lb), SpO2 94 %.    Relevant Results  Results for orders placed or performed during the hospital encounter of 03/17/24 (from the past 24 hour(s))   CBC   Result Value Ref Range    WBC 8.7 4.4 - 11.3 x10*3/uL    nRBC 0.0 0.0 - 0.0 /100 WBCs    RBC 3.86 (L) 4.50 - 5.90 x10*6/uL    Hemoglobin 12.0 (L) 13.5 - 17.5 g/dL    Hematocrit 38.7 (L) 41.0 - 52.0 %     80 - 100 fL    MCH 31.1 26.0 - 34.0 pg    MCHC 31.0 (L) 32.0 - 36.0 g/dL    RDW 13.3 11.5 - 14.5 %    Platelets 238 150 - 450 x10*3/uL   Comprehensive metabolic panel   Result Value Ref Range    Glucose 110 (H) 74 - 99 mg/dL    Sodium 136 136 - 145 mmol/L    Potassium 3.7 3.5 - 5.3 mmol/L    Chloride 100 98 - 107 " mmol/L    Bicarbonate 28 21 - 32 mmol/L    Anion Gap 12 10 - 20 mmol/L    Urea Nitrogen 20 6 - 23 mg/dL    Creatinine 1.70 (H) 0.50 - 1.30 mg/dL    eGFR 37 (L) >60 mL/min/1.73m*2    Calcium 8.5 (L) 8.6 - 10.3 mg/dL    Albumin 3.4 3.4 - 5.0 g/dL    Alkaline Phosphatase 97 33 - 136 U/L    Total Protein 6.5 6.4 - 8.2 g/dL    AST 19 9 - 39 U/L    Bilirubin, Total 0.7 0.0 - 1.2 mg/dL    ALT 27 10 - 52 U/L   B-type Natriuretic Peptide   Result Value Ref Range    BNP 1,202 (H) 0 - 99 pg/mL   Troponin I, High Sensitivity   Result Value Ref Range    Troponin I, High Sensitivity 29 (H) 0 - 20 ng/L   C-reactive protein   Result Value Ref Range    C-Reactive Protein 6.51 (H) <1.00 mg/dL   Sedimentation rate, automated   Result Value Ref Range    Sedimentation Rate 29 (H) 0 - 20 mm/h     XR chest 1 view    Result Date: 3/17/2024  Interpreted By:  Justine Hernandez, STUDY: Chest, single AP view.   INDICATION: Signs/Symptoms:Cough, rales, sob.   COMPARISON: 07/09/2021   ACCESSION NUMBER(S): [id number]   ORDERING CLINICIAN: PETR DWYER   FINDINGS: Left subclavian AICD/pacemaker device noted. The cardiac silhouette size is within normal limits. Bilateral small pleural effusions noted, right greater than left. Patchy opacities of the right lower lung which may represent pneumonia in appropriate clinical context. No pneumothorax. No acute osseous abnormality.       1. Patchy opacities of the right lower lung which may represent pneumonia in appropriate clinical context. Continued imaging follow-up is suggested in 8-10 weeks to document resolution/improvement. 2. Small bibasilar pleural effusions, right greater than left   MACRO: None.   Signed by: Justine Hernandez 3/17/2024 5:24 PM Dictation workstation:   [id number]    XR knee right 4+ views    Result Date: 3/17/2024  Interpreted By:  Justine Hernandez, STUDY: Right knee, 4 views.   INDICATION: Signs/Symptoms:Knee pain   COMPARISON: None.   ACCESSION NUMBER(S): [id number]    ORDERING CLINICIAN: PETR DWYER   FINDINGS: No acute fracture or malalignment. Mild medial and patellofemoral compartment degenerative changes with joint space loss and osteophytes.   Small knee joint effusion. Bones appear demineralized. Loose bodies are noted in the suprapatellar recess of the knee. Vascular calcifications of the femoral and popliteal arteries noted. Mild soft tissue edema of the medial knee noted as well.       1. Mild medial and patellofemoral compartment osteoarthrosis. 2. Small knee joint effusion and soft tissue edema/swelling of the medial knee which is nonspecific.   MACRO: None.   Signed by: Justine Hernandez 3/17/2024 5:23 PM Dictation workstation:   [id number]    Scheduled medications  furosemide, 40 mg, intravenous, Once      Continuous medications     PRN medications    Assessment/Plan   Principal Problem:    Acute pain of right knee      Amari Boyer is a 89 y.o. male with significant medical history of A fib- eliquis, CAD s/p CABG 1993 with LIMA-LAD, VG-OM and VG-RCA, [employer] DDDR pacemakers in 2000, 2005, 2013, and most recently in November 2020, PCI 4/2010 OM with ETHAN, HFpEF, COPD, hypothyroidism, CKD stage 3b, Hx of CVA w/ residual L side weakness, T2DM- insulin independent, lymphedema  presenting with generalized weakness and right-sided knee pain concerning for CAP and Aseptic vs septic arthritis of R knee joint. Admitted for further workup and monitoring.     Community-acquired pneumonia after recent influenza infection  Generalized weakness  -Had flu 10 days ago with 5-day treatment of Tamiflu  - Received 1 g ceftriaxone and 500 mg azithromycin in ED, will add 1 g ceftriaxone for total dose of 2 g ceftriaxone to date.  - Will continue 2 g ceftriaxone daily and 100 mg doxycycline BID 2/2 prolonged QTC  - no leukocytosis, afebrile  - atypical pna labs pending  - pct pending  - will utilize mucolytics and flutter device to aid in patient  - encouraged  IS    HFpEF  Lymphedema  - dry weight is 183 pounds  - 180 pounds on admission  - unlikely that he is having an exacerbation of heart failure  - will continue daily weights and strict I/Os  - ECHO 7/10/21 45-50% EF, borderline increased LV mass, impaired relaxation of left ventricular diastolic filling, aortic valve sclerosis was present, mild AR, global hypokinesis of LV.  - repeat ECHO pending  - received 40 mg IV lasix while in ED    Aseptic vs septic arthritis of right knee joint  - hyperacute onset <1 day  - no overt erythema on exam, but there is tenderness on lateral side of R knee that is new to patient  - PT/OT consulted  - orthopedic surgery consulted  - CRP/ESR elevated  - further management pending arthrocentesis/orthopedic recs  - Blood cultures obtained after patient received abx in ED  - received ceftriaxone/azithromycin  - abx as detailed above    A-fib on Eliquis  CAD status post CABG 1993  Sick sinus syndrome status post pacemaker 2000, 2005, 2013, and most recently in November 2020  COPD  Hypothyroidism  CKD stage IIIb - stable  History of CVA with residual left-sided weakness  T2DM insulin-dependent - SS#2  - continue home meds as appropriate    DIET: cardiac  DVT PPX: eliquis  ABX: rocephin/doxy  CODE STATUS: dnr dni  Disposition: >2 midnights      To be discussed with attending physician,  Panda Tamez D.O.  PGY-1 Internal medicine resident    --------------------------  Senior resident addendum:    Amari Boyer is a 89 y.o. male with a history of CAD s/p CABG (3 vessel in the past with LIMA-LAD, VG-OM, and VG-RCA in 1993) with known occluded VG-RCA, dyslipidemia, hypertension, lymphedema, remote stroke, sick sinus syndrome status post pacer in situ, chronic diastolic heart failure, hypothyroidism and atrial fibrillation who presents to [facility] ED with complaints of generalized weakness, fatigue, right knee pain and symptoms of congestion consistent with cough with productive green  sputum.  He has had no recent dyspnea at rest or upon exertion.  Denies fevers or chills.  No angina, abdominal pain.  Does state that he has edema in bilateral lower extremities, left greater than right (chronic lymphedema of the left greater than right).  Also complaining of a 1 day history of right knee pain localized primarily to the lateral aspect with associated warmth and swelling.  He has suffered no falls or injuries to his right lower extremity.  This is a first-time event for him.  It is significantly affecting his ambulation.  Of note, he recently completed Tamiflu for influenza infection.  He resides at [place].  He ambulates with a walker/rollator, though of recent, he has had poor ambulation secondary to right knee pain.  He does have left-sided deficits secondary to a prior stroke.     Of note, last visit with cardiologist was on 13 February with Dr. Jarrell.  Outpatient medication list includes a as needed 20 mg Lasix (which patient states he has not taken a water pill in over a month secondary to increased diuresis and inability to get to the restroom in a timely fashion). Last echo 7/2021: LVEF of 50%, borderline increased LV mass, impaired relaxation pattern of LV DF, elevated left ventricular and end-diastolic pressure, slightly elevated RVSP, aortic valve sclerosis, mild AR, global hypokinesis of left ventricle with minor regional variations.  Arrived to the ED afebrile temperature 97.7, HR 65, RR 18, /87 saturating 95% on room air.  CBC without leukocytosis.  Hemoglobin above baseline at 12.  No thrombocytopenia with platelets 238.  CMP with creatinine 1.7 and BUN 20 (baseline creatinine 1.8-2).  Troponin 29. EKG pending. BNP 1200 (775 in July 2021).  Elevated SED of 29 and elevated CRP of 6.51.  CXR with patchy opacities in the right lower lung, evidence of vascular congestion, and small bibasilar pleural effusions right greater than left.  X-ray of the right knee with mild medial and  patellofemoral compartment osteoarthrosis, small knee joint effusion and soft tissue swelling of the medial knee.    In the ER, he received Rocephin and azithromycin. He was admitted to teaching service for management of community-acquired bacterial pneumonia superimposed on viral (recent treatment with Tamiflu for influenza), CHF exacerbation, and right knee effusion concerning for gout versus septic/aseptic arthritis.    On exam, he is on room air with mild conversational dyspnea and mild sternal retractions.  Saturations greater than 90%.  Heart sounds regular and nontachycardic without murmur.  Posterior lung sounds with crackles and decreased aeration right greater than left and bibasilar fields.  Abdomen soft and nontender with bowel sounds appreciated.  Bilateral lower extremity 2-3+ pitting edema to the knee (left greater than right given chronic lymphedema).  Right knee with increased warmth, obvious edema, TTP, and without erythema or wounds.    # Community-acquired pneumonia superimposed on influenza  -Evidence of patchy opacities of the right lower lobe on CXR  -Received azithromycin and Rocephin in the ER  -Continue Rocephin and doxycycline (ECG QTc 532)  -Pending urine antigens and procalcitonin  -Pending blood and sputum cultures  -Bronchial hygiene with I-S/flutter valve  -PT OT    # CHF exacerbation  # Bibasilar pleural effusions (right greater than left)  - CXR with evidence of vascular congestion, and small bibasilar pleural effusions right greater than left  -Last echo 7/2021: LVEF of 50%, borderline increased LV mass, impaired relaxation pattern of LV DF, elevated left ventricular and end-diastolic pressure, slightly elevated RVSP, aortic valve sclerosis, mild AR, global hypokinesis of left ventricle with minor regional variations.  -Patient has not been taking his as needed Lasix, and has not been taking for over a month secondary to increased diuresis and inability to ambulate to the restroom  in timely manner  -BNP of 1200  -Wrap legs in ace bandage from forefoot to knee  -Consider repeat 2D echo since last was in 2021  -Given 40 mg IV Lasix  -Strict I's and O's    # Right knee effusion C/F gout versus less likely septic arthritis  # Knee osteoarthrosis  -Elevated ESR and CRP. Afebrile and no leukocytosis  -X-ray of the right knee with mild medial and patellofemoral compartment osteoarthrosis, small knee joint effusion and soft tissue swelling of the medial knee.  -Adequate pain control  -Consider Ortho consult for possible arthrocentesis  -Pending uric acid    All other comorbidities manage as documented above    Sudhakar Gottlieb, DO  PGY-2 Internal Medicine

## 2024-03-18 PROBLEM — I50.9 HEART FAILURE (MULTI): Status: ACTIVE | Noted: 2024-01-01

## 2024-03-18 NOTE — CONSULTS
Reason For Consult  Right knee pain and swelling    History Of Present Illness  Amari Boyer is a 89 y.o. male presenting with few weeks history of generalized decline.  Patient had recent flu was treated subsequently developed knee pain and swelling complicated past medical history as detailed in the history and physical    X-rays show advanced arthritic change right knee.    He has remained afebrile white count is normal but has increased elevated CRP and sed rate.    Uric acid levels are pending    Concern is obviously for septic joint versus gouty flareup right knee.     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, subcortical (CMS/HCC), Other injury of unspecified body region, initial encounter, Peripheral vascular angioplasty status, Personal history of other diseases of the circulatory system (03/05/2015), Personal history of other diseases of the circulatory system (12/03/2015), Personal history of other diseases of the circulatory system (04/26/2016), Personal history of other endocrine, nutritional and metabolic disease (09/10/2019), Personal history of other malignant neoplasm of skin, Personal history of other medical treatment, Personal history of other medical treatment, Personal history of other specified conditions (07/27/2021), and Personal history of pneumonia (recurrent).    Surgical History  He has a past surgical history that includes Cataract extraction (11/07/2014); Other surgical history (07/27/2022); Other surgical history (07/27/2022); Other surgical history (03/22/2021); Other surgical history (03/22/2021); Other surgical history (03/22/2021); Other surgical history (03/22/2021); Other surgical history (03/22/2021); Other surgical history (03/22/2021);  Other surgical history (03/22/2021); Other surgical history (03/22/2021); Other surgical history (03/22/2021); Other surgical history (03/22/2021); Other surgical history (03/22/2021); Other surgical history (03/22/2021); Other surgical history (03/22/2021); Other surgical history (03/22/2021); Coronary artery bypass graft (04/26/2016); Other surgical history (04/26/2016); CT angio head w and wo IV contrast (7/9/2021); and CT angio neck (7/9/2021).     Social History  He reports that he quit smoking about 51 years ago. His smoking use included cigarettes. He has never used smokeless tobacco. He reports that he does not currently use alcohol. He reports that he does not currently use drugs.    Family History  Family History   Problem Relation Name Age of Onset    Coronary artery disease Mother      Heart attack Mother      Other (ICD in place) Mother      Coronary artery disease Father      Heart attack Father      Other (ICD in place) Father      Coronary artery disease Sister      Coronary artery disease Brother      Heart attack Brother          Allergies  Atorvastatin, Rosuvastatin, Simvastatin, and Ranolazine    Review of Systems  Noncontributory     Physical Exam  Head normocephalic atraumatic  Heart is regular in rhythm  Lungs are clear to auscultation Calogen  Abdominal exam nontender nondistended  Right knee exam  Limited motion 10 to 20 degree flexion contracture with flexion at 90.    Pain with range of motion    3+ effusion    Stable exam to side-to-side and anterior posterior drawer testing moving toes foot and ankle extensor mechanism is intact the knee is swollen but not significantly reddened    No gross instability    No pain in the ankle toes or foot there is some cellulitis and redness from the distal third of the calf extending down over the top of the foot and ankle     Last Recorded Vitals  Blood pressure 133/72, pulse 70, temperature 36.7 °C (98.1 °F), temperature source Temporal, resp. rate  "20, height 1.753 m (5' 9\"), weight 82 kg (180 lb 12.4 oz), SpO2 95 %.    Relevant Results      Scheduled medications  apixaban, 2.5 mg, oral, BID  cefTRIAXone, 2 g, intravenous, q24h  doxycycline, 100 mg, intravenous, q12h  insulin lispro, 0-10 Units, subcutaneous, TID with meals  isosorbide mononitrate ER, 60 mg, oral, BID  levothyroxine, 75 mcg, oral, Daily  metoprolol tartrate, 50 mg, oral, BID  nystatin, 1 Application, Topical, BID  polyethylene glycol, 17 g, oral, Daily  psyllium, 1 packet, oral, Daily  tamsulosin, 0.4 mg, oral, Daily      Continuous medications     PRN medications  PRN medications: acetaminophen, dextrose, dextrose, glucagon  Results for orders placed or performed during the hospital encounter of 03/17/24 (from the past 24 hour(s))   CBC   Result Value Ref Range    WBC 8.7 4.4 - 11.3 x10*3/uL    nRBC 0.0 0.0 - 0.0 /100 WBCs    RBC 3.86 (L) 4.50 - 5.90 x10*6/uL    Hemoglobin 12.0 (L) 13.5 - 17.5 g/dL    Hematocrit 38.7 (L) 41.0 - 52.0 %     80 - 100 fL    MCH 31.1 26.0 - 34.0 pg    MCHC 31.0 (L) 32.0 - 36.0 g/dL    RDW 13.3 11.5 - 14.5 %    Platelets 238 150 - 450 x10*3/uL   Comprehensive metabolic panel   Result Value Ref Range    Glucose 110 (H) 74 - 99 mg/dL    Sodium 136 136 - 145 mmol/L    Potassium 3.7 3.5 - 5.3 mmol/L    Chloride 100 98 - 107 mmol/L    Bicarbonate 28 21 - 32 mmol/L    Anion Gap 12 10 - 20 mmol/L    Urea Nitrogen 20 6 - 23 mg/dL    Creatinine 1.70 (H) 0.50 - 1.30 mg/dL    eGFR 37 (L) >60 mL/min/1.73m*2    Calcium 8.5 (L) 8.6 - 10.3 mg/dL    Albumin 3.4 3.4 - 5.0 g/dL    Alkaline Phosphatase 97 33 - 136 U/L    Total Protein 6.5 6.4 - 8.2 g/dL    AST 19 9 - 39 U/L    Bilirubin, Total 0.7 0.0 - 1.2 mg/dL    ALT 27 10 - 52 U/L   B-type Natriuretic Peptide   Result Value Ref Range    BNP 1,202 (H) 0 - 99 pg/mL   Troponin I, High Sensitivity   Result Value Ref Range    Troponin I, High Sensitivity 29 (H) 0 - 20 ng/L   C-reactive protein   Result Value Ref Range    " C-Reactive Protein 6.51 (H) <1.00 mg/dL   Sedimentation rate, automated   Result Value Ref Range    Sedimentation Rate 29 (H) 0 - 20 mm/h   Uric Acid   Result Value Ref Range    Uric Acid 5.7 4.0 - 7.5 mg/dL   Blood Culture    Specimen: Peripheral Venipuncture; Blood culture   Result Value Ref Range    Blood Culture Loaded on Instrument - Culture in progress    Blood Culture    Specimen: Peripheral Venipuncture; Blood culture   Result Value Ref Range    Blood Culture Loaded on Instrument - Culture in progress    CBC   Result Value Ref Range    WBC 8.6 4.4 - 11.3 x10*3/uL    nRBC 0.0 0.0 - 0.0 /100 WBCs    RBC 3.59 (L) 4.50 - 5.90 x10*6/uL    Hemoglobin 11.3 (L) 13.5 - 17.5 g/dL    Hematocrit 35.5 (L) 41.0 - 52.0 %    MCV 99 80 - 100 fL    MCH 31.5 26.0 - 34.0 pg    MCHC 31.8 (L) 32.0 - 36.0 g/dL    RDW 13.3 11.5 - 14.5 %    Platelets 238 150 - 450 x10*3/uL   Magnesium   Result Value Ref Range    Magnesium 1.73 1.60 - 2.40 mg/dL   Renal Function Panel   Result Value Ref Range    Glucose 99 74 - 99 mg/dL    Sodium 136 136 - 145 mmol/L    Potassium 3.1 (L) 3.5 - 5.3 mmol/L    Chloride 99 98 - 107 mmol/L    Bicarbonate 28 21 - 32 mmol/L    Anion Gap 12 10 - 20 mmol/L    Urea Nitrogen 20 6 - 23 mg/dL    Creatinine 1.76 (H) 0.50 - 1.30 mg/dL    eGFR 36 (L) >60 mL/min/1.73m*2    Calcium 8.1 (L) 8.6 - 10.3 mg/dL    Phosphorus 2.8 2.5 - 4.9 mg/dL    Albumin 3.1 (L) 3.4 - 5.0 g/dL        Assessment/Plan     Before aspiration  the benefits of a cortisone injection including infection, local skin irritation, skin atrophy, calcification, continued pain and discomfort, elevated blood sugar, burning, failure to relieve pain, possible late infection were discussed with the patient.    Postprocedure discomfort can be alleviated with additional medications, ice, elevation, rest over the first 24 hours as recommended.    Patient verbalized understanding and wanted to proceed with the planned procedure.    After informed consent was  provided and allergies verified, the patient was positioned appropriately on thel bed.  The joint to be aspirated was prepped and draped in a sterile fashion.  The skin was anesthetized with ethyl chloride spray.  A joint aspiration was to be performed an 18-gauge needle was used.      The needle was removed and the puncture site closed and sealed with a Band-Aid.  The patient tolerated the procedure well.    We easily aspirated 20 cc of serous yellow sanguinous fluid    the fluid was aspirated it was sent for further studies and a sterile specimen container as ordered to the lab.    Uric acid level appears to be normal at 5.7    Follow-up on crystal studies    Pending results of aspirate studies potential irrigation debridement versus cortisone injection may be judged necessary and appropriate.  Will follow the patient closely    Amari Araujo MD

## 2024-03-18 NOTE — CARE PLAN
The patient's goals for the shift include      The clinical goals for the shift include patient will remain HDS throughout shift    Problem: Pain  Goal: My pain/discomfort is manageable  3/18/2024 0110 by Missy Avila RN  Outcome: Progressing  3/18/2024 0110 by Missy Avila RN  Outcome: Progressing     Problem: Safety  Goal: Patient will be injury free during hospitalization  3/18/2024 0110 by Missy Avila RN  Outcome: Progressing  3/18/2024 0110 by Missy Avila RN  Outcome: Progressing  Goal: I will remain free of falls  3/18/2024 0110 by Missy Avila RN  Outcome: Progressing  3/18/2024 0110 by Missy Avila RN  Outcome: Progressing

## 2024-03-18 NOTE — PROGRESS NOTES
Amari Boyer is a 89 y.o. male on day 1 of admission presenting with Acute pain of right knee.      Subjective   He was complaining of pain in his left heel.  The pillows were adjusted and removed some pressure off his foot.  He endorsed a productive cough.    His son was bedside in the afternoon and did concerns about the possibility of his father having surgery.  He wanted a clear goal of risk versus benefit for any sort of intervention.  He reported his father having a stroke after having to stop anticoagulation for another procedure and wanted it restarted if there will be no surgery on his knee.  All of his son's questions were answered based on current information.       Objective     Last Recorded Vitals  /70 (BP Location: Right arm, Patient Position: Lying)   Pulse 67   Temp 36.6 °C (97.9 °F) (Temporal)   Resp 18   Wt 82 kg (180 lb 12.4 oz)   SpO2 96%   Intake/Output last 3 Shifts:    Intake/Output Summary (Last 24 hours) at 3/18/2024 1400  Last data filed at 3/18/2024 1200  Gross per 24 hour   Intake 1065 ml   Output 1050 ml   Net 15 ml       Admission Weight  Weight: 81.6 kg (180 lb) (03/17/24 1526)    Daily Weight  03/18/24 : 82 kg (180 lb 12.4 oz)    Image Results  Electrocardiogram, 12-lead PRN ACS symptoms  Atrial fibrillation with a competing junctional pacemaker  Left axis deviation  Left ventricular hypertrophy with QRS widening  Possible Lateral infarct , age undetermined  Inferior infarct , age undetermined  Abnormal ECG  When compared with ECG of 17-MAR-2024 20:12, (unconfirmed)  Atrial fibrillation has replaced Wide QRS rhythm  ECG 12 Lead  Wide QRS rhythm  Left axis deviation  Left ventricular hypertrophy with QRS widening and repolarization abnormality  Possible Lateral infarct , age undetermined  Inferior infarct , age undetermined  Abnormal ECG  When compared with ECG of 09-JUL-2021 13:37,  Wide QRS rhythm has replaced Sinus rhythm      Physical Exam  Constitutional:        Appearance: He is ill-appearing.   HENT:      Mouth/Throat:      Mouth: Mucous membranes are moist.      Pharynx: Oropharynx is clear.   Eyes:      Comments: Erythematous, discharge and excessive fluid drainage L>R   Cardiovascular:      Rate and Rhythm: Rhythm irregular.   Pulmonary:      Effort: No respiratory distress.      Breath sounds: No stridor. Rhonchi present.   Abdominal:      General: Abdomen is flat. Bowel sounds are normal.      Palpations: Abdomen is soft.   Musculoskeletal:      Right lower leg: No edema.      Left lower leg: No edema.   Skin:     General: Skin is warm and dry.   Psychiatric:         Mood and Affect: Mood normal.         Behavior: Behavior normal.         Thought Content: Thought content normal.         Judgment: Judgment normal.         Relevant Results  .Scheduled medications  [Held by provider] apixaban, 2.5 mg, oral, BID  cefTRIAXone, 2 g, intravenous, q24h  doxycycline, 100 mg, intravenous, q12h  insulin lispro, 0-10 Units, subcutaneous, TID with meals  isosorbide mononitrate ER, 60 mg, oral, BID  levothyroxine, 75 mcg, oral, Daily  metoprolol tartrate, 50 mg, oral, BID  nystatin, 1 Application, Topical, BID  perflutren lipid microspheres, 0.5-10 mL of dilution, intravenous, Once in imaging  perflutren protein A microsphere, 0.5 mL, intravenous, Once in imaging  polyethylene glycol, 17 g, oral, Daily  polymyxin B sulf-trimethoprim, 1 drop, Both Eyes, q4h BRITTON  psyllium, 1 packet, oral, Daily  sulfur hexafluoride microsphr, 2 mL, intravenous, Once in imaging  tamsulosin, 0.4 mg, oral, Daily      Continuous medications     PRN medications  PRN medications: acetaminophen, dextrose, dextrose, glucagon    Results for orders placed or performed during the hospital encounter of 03/17/24 (from the past 24 hour(s))   CBC   Result Value Ref Range    WBC 8.7 4.4 - 11.3 x10*3/uL    nRBC 0.0 0.0 - 0.0 /100 WBCs    RBC 3.86 (L) 4.50 - 5.90 x10*6/uL    Hemoglobin 12.0 (L) 13.5 - 17.5 g/dL     Hematocrit 38.7 (L) 41.0 - 52.0 %     80 - 100 fL    MCH 31.1 26.0 - 34.0 pg    MCHC 31.0 (L) 32.0 - 36.0 g/dL    RDW 13.3 11.5 - 14.5 %    Platelets 238 150 - 450 x10*3/uL   Comprehensive metabolic panel   Result Value Ref Range    Glucose 110 (H) 74 - 99 mg/dL    Sodium 136 136 - 145 mmol/L    Potassium 3.7 3.5 - 5.3 mmol/L    Chloride 100 98 - 107 mmol/L    Bicarbonate 28 21 - 32 mmol/L    Anion Gap 12 10 - 20 mmol/L    Urea Nitrogen 20 6 - 23 mg/dL    Creatinine 1.70 (H) 0.50 - 1.30 mg/dL    eGFR 37 (L) >60 mL/min/1.73m*2    Calcium 8.5 (L) 8.6 - 10.3 mg/dL    Albumin 3.4 3.4 - 5.0 g/dL    Alkaline Phosphatase 97 33 - 136 U/L    Total Protein 6.5 6.4 - 8.2 g/dL    AST 19 9 - 39 U/L    Bilirubin, Total 0.7 0.0 - 1.2 mg/dL    ALT 27 10 - 52 U/L   B-type Natriuretic Peptide   Result Value Ref Range    BNP 1,202 (H) 0 - 99 pg/mL   Troponin I, High Sensitivity   Result Value Ref Range    Troponin I, High Sensitivity 29 (H) 0 - 20 ng/L   C-reactive protein   Result Value Ref Range    C-Reactive Protein 6.51 (H) <1.00 mg/dL   Sedimentation rate, automated   Result Value Ref Range    Sedimentation Rate 29 (H) 0 - 20 mm/h   Uric Acid   Result Value Ref Range    Uric Acid 5.7 4.0 - 7.5 mg/dL   Procalcitonin   Result Value Ref Range    Procalcitonin 0.20 (H) <=0.07 ng/mL   Blood Culture    Specimen: Peripheral Venipuncture; Blood culture   Result Value Ref Range    Blood Culture Loaded on Instrument - Culture in progress    Blood Culture    Specimen: Peripheral Venipuncture; Blood culture   Result Value Ref Range    Blood Culture Loaded on Instrument - Culture in progress    Electrocardiogram, 12-lead PRN ACS symptoms   Result Value Ref Range    Ventricular Rate 65 BPM    Atrial Rate 394 BPM    QRS Duration 178 ms    QT Interval 512 ms    QTC Calculation(Bazett) 532 ms    R Axis -81 degrees    T Axis 90 degrees    QRS Count 10 beats    Q Onset 186 ms    T Offset 442 ms    QTC Fredericia 525 ms   ECG 12 Lead   Result  Value Ref Range    Ventricular Rate 66 BPM    Atrial Rate 277 BPM    QRS Duration 182 ms    QT Interval 512 ms    QTC Calculation(Bazett) 536 ms    R Axis -82 degrees    T Axis 90 degrees    QRS Count 11 beats    Q Onset 185 ms    T Offset 441 ms    QTC Fredericia 529 ms   CBC   Result Value Ref Range    WBC 8.6 4.4 - 11.3 x10*3/uL    nRBC 0.0 0.0 - 0.0 /100 WBCs    RBC 3.59 (L) 4.50 - 5.90 x10*6/uL    Hemoglobin 11.3 (L) 13.5 - 17.5 g/dL    Hematocrit 35.5 (L) 41.0 - 52.0 %    MCV 99 80 - 100 fL    MCH 31.5 26.0 - 34.0 pg    MCHC 31.8 (L) 32.0 - 36.0 g/dL    RDW 13.3 11.5 - 14.5 %    Platelets 238 150 - 450 x10*3/uL   Magnesium   Result Value Ref Range    Magnesium 1.73 1.60 - 2.40 mg/dL   Renal Function Panel   Result Value Ref Range    Glucose 99 74 - 99 mg/dL    Sodium 136 136 - 145 mmol/L    Potassium 3.1 (L) 3.5 - 5.3 mmol/L    Chloride 99 98 - 107 mmol/L    Bicarbonate 28 21 - 32 mmol/L    Anion Gap 12 10 - 20 mmol/L    Urea Nitrogen 20 6 - 23 mg/dL    Creatinine 1.76 (H) 0.50 - 1.30 mg/dL    eGFR 36 (L) >60 mL/min/1.73m*2    Calcium 8.1 (L) 8.6 - 10.3 mg/dL    Phosphorus 2.8 2.5 - 4.9 mg/dL    Albumin 3.1 (L) 3.4 - 5.0 g/dL   POCT GLUCOSE   Result Value Ref Range    POCT Glucose 98 74 - 99 mg/dL   Body Fluid Cell Count   Result Value Ref Range    Color, Fluid Straw Colorless, Straw, Yellow    Clarity, Fluid Hazy (A) Clear    WBC, Fluid 23,552 See Comment /uL    RBC, Fluid 1,000 0  /uL /uL   Body Fluid Differential   Result Value Ref Range    Neutrophils %, Manual, Fluid 99 <25 % %    Lymphocytes %, Manual, Fluid 1 <75 % %    Mono/Macrophages %, Manual, Fluid 0 <70 % %    Eosinophils %, Manual, Fluid 0 0 % %    Basophils %, Manual, Fluid 0 0 % %    Immature Granulocytes %, Manual, Fluid 0 0 % %    Blasts %, Manual, Fluid 0 0 % %    Unclassified Cells %, Manual, Fluid 0 0 % %    Plasma Cells %, Manual, Fluid 0 0 % %    Total Cells Counted, Fluid 100    Sars-CoV-2 and Influenza A/B PCR   Result Value Ref Range     Flu A Result Detected (A) Not Detected    Flu B Result Not Detected Not Detected    Coronavirus 2019, PCR Not Detected Not Detected   RSV PCR   Result Value Ref Range    RSV PCR Not Detected Not Detected   POCT GLUCOSE   Result Value Ref Range    POCT Glucose 102 (H) 74 - 99 mg/dL     Electrocardiogram, 12-lead PRN ACS symptoms    Result Date: 3/18/2024  Atrial fibrillation with a competing junctional pacemaker Left axis deviation Left ventricular hypertrophy with QRS widening Possible Lateral infarct , age undetermined Inferior infarct , age undetermined Abnormal ECG When compared with ECG of 17-MAR-2024 20:12, (unconfirmed) Atrial fibrillation has replaced Wide QRS rhythm    ECG 12 Lead    Result Date: 3/18/2024  Wide QRS rhythm Left axis deviation Left ventricular hypertrophy with QRS widening and repolarization abnormality Possible Lateral infarct , age undetermined Inferior infarct , age undetermined Abnormal ECG When compared with ECG of 09-JUL-2021 13:37, Wide QRS rhythm has replaced Sinus rhythm    XR chest 1 view    Result Date: 3/17/2024  Interpreted By:  Justine Hernandez, STUDY: Chest, single AP view.   INDICATION: Signs/Symptoms:Cough, rales, sob.   COMPARISON: 07/09/2021   ACCESSION NUMBER(S): [id number]   ORDERING CLINICIAN: PETR DWYER   FINDINGS: Left subclavian AICD/pacemaker device noted. The cardiac silhouette size is within normal limits. Bilateral small pleural effusions noted, right greater than left. Patchy opacities of the right lower lung which may represent pneumonia in appropriate clinical context. No pneumothorax. No acute osseous abnormality.       1. Patchy opacities of the right lower lung which may represent pneumonia in appropriate clinical context. Continued imaging follow-up is suggested in 8-10 weeks to document resolution/improvement. 2. Small bibasilar pleural effusions, right greater than left   MACRO: None.   Signed by: Justine Hernandez 3/17/2024 5:24 PM Dictation  workstation:   [id number]    XR knee right 4+ views    Result Date: 3/17/2024  Interpreted By:  Justine Hernandez, STUDY: Right knee, 4 views.   INDICATION: Signs/Symptoms:Knee pain   COMPARISON: None.   ACCESSION NUMBER(S): [id number]   ORDERING CLINICIAN: PETR DWYER   FINDINGS: No acute fracture or malalignment. Mild medial and patellofemoral compartment degenerative changes with joint space loss and osteophytes.   Small knee joint effusion. Bones appear demineralized. Loose bodies are noted in the suprapatellar recess of the knee. Vascular calcifications of the femoral and popliteal arteries noted. Mild soft tissue edema of the medial knee noted as well.       1. Mild medial and patellofemoral compartment osteoarthrosis. 2. Small knee joint effusion and soft tissue edema/swelling of the medial knee which is nonspecific.   MACRO: None.   Signed by: Justine Hernandez 3/17/2024 5:23 PM Dictation workstation:   [id number]       Assessment/Plan        Principal Problem:    Acute pain of right knee  Active Problems:    Anemia    CAD (coronary artery disease)    Dyslipidemia    Essential hypertension    Acquired hypothyroidism    Stage 3b chronic kidney disease (CMS/HCC)    Heart failure (CMS/[facility])    Amari Boyer is a 89 y.o. male presents with weakness, productive cough and right knee pain. Past medical history of CAD s/p CABG (3 vessel in the past with LIMA-LAD, VG-OM, and VG-RCA in 1993) with known occluded VG-RCA, dyslipidemia, hypertension, lymphedema, remote stroke, sick sinus syndrome status post pacer in situ, chronic diastolic heart failure, hypothyroidism and atrial fibrillation.  Patient was positive for the flu 10 days ago and received 5 days of Tamiflu however failed to improve.  Chest x-ray shows patchy infiltrates along with a right greater than left pleural effusion suggestive of pneumonia.  Community acquired pneumonia suspected given history of viral infection with possible  superimposed bacterial infection.  Ortho following for right joint aspiration has been completed.  Awaiting Recs on intervention.    #Community-acquired pneumonia superimposed on influenza  #Flu A positive  Evidence of patchy opacities of the right lower lobe on CXR  -Continue Rocephin and doxycycline (ECG QTc 532) 3/18-; Received azithromycin and Rocephin in the ER  -Pending urine antigens and   -pro calcitonin = 0.20  -Pending blood culture  -Bronchial hygiene with I-S/flutter valve  -PT OT     #CHF exacerbation?  #Bibasilar pleural effusions (right greater than left)  CXR with evidence of vascular congestion, and small bibasilar pleural effusions right greater than left  Last echo 7/2021: LVEF of 50%, borderline increased LV mass, impaired relaxation pattern of LV DF, elevated left ventricular and end-diastolic pressure, slightly elevated RVSP, aortic valve sclerosis, mild AR, global hypokinesis of left ventricle with minor regional variations.  Patient has not been taking his as needed Lasix, and has not been taking for over a month secondary to increased diuresis and inability to ambulate to the restroom in timely manner  -BNP of 1200 in the setting of CKD  -Given 40 mg IV Lasix given at admission   -Strict I's and O's     # Right knee effusion C/F gout versus less likely septic arthritis  # Knee osteoarthrosis  Elevated ESR and CRP. Afebrile and no leukocytosis  Uric acid normal, no hx of gout  X-ray of the right knee with mild medial and patellofemoral compartment osteoarthrosis, small knee joint effusion and soft tissue swelling of the medial knee.  Fluid aspiration showed =  99% neutrophils, 100 RBCs, 42656 WBC  -Ortho consulted = performed aspiration, held AC for possible wash out, awaiting final recs  -Gram stain pending  -Adequate pain control  -MRSA swab pending, will add coverage if needed    #Conjunctivitis   - Polytrim eyedrops    #Chronic Conditions  CAD s/p CABG -  Imdur  Dyslipidemia  Hypertension  lymphedema  H/o CVA  sick sinus syndrome status post pacer in situ  Hypothyroidism - levothyroxine  Atrial fibrillation - Eliquis held, metoprolol  BPH - flomax      DVT: AC held for possible wash out  Bowel Regimen: miralax  CODE: DNR/DNI  Disposition: likely will need some skilled nursing at dc, awaiting PT/OT recs    Assessment and plan to be discussed with senior resident and attending physician.   Cari Jaarmillo D.O.  PGY-1, Internal Medicine  [facility] [place]        Cari Jaramillo DO

## 2024-03-18 NOTE — NURSING NOTE
1145- No acute changes this morning. No complaints of pain unless moving his legs. Potassium pills given as replacement. Son at bedside this morning. Son requesting to talk with doctors and they will come see him around 1pm. Reached out to Orthopaedics as well to see if there was any plan for anything. Safety maintained and call light within reach.

## 2024-03-18 NOTE — NURSING NOTE
Received pt in transfer, oriented to room / call light. Son at bedside, Dr Jaramillo rounding and updating pt/son

## 2024-03-19 NOTE — CARE PLAN
The patient's goals for the shift include having a restful night.    The clinical goals for the shift include Pt will remain free of shortness of breath.    Over the shift, the patient did not make progress toward the following goals. Barriers to progression include n/a. Recommendations to address these barriers include n/a.

## 2024-03-19 NOTE — PROGRESS NOTES
"Amari Boyer is a 89 y.o. male presenting with Acute pain of right knee.    Subjective   Mr. Boyer tells me that his right knee feels a bit better today. His son/POA, Nikko, at bedside, is curious about options for pain relief for his right knee to allow him better mobility. Mr. Boyer has left sided weakness related to a remote CVA and relies heavily on his right LE for ambulation and transfers.        Objective     Physical Exam  Vitals reviewed.   HENT:      Head: Normocephalic.      Mouth/Throat:      Mouth: Mucous membranes are moist.      Pharynx: Oropharynx is clear.   Eyes:      Extraocular Movements: Extraocular movements intact.   Cardiovascular:      Rate and Rhythm: Normal rate.   Pulmonary:      Effort: Pulmonary effort is normal.   Abdominal:      Palpations: Abdomen is soft.   Musculoskeletal:      Comments: Right knee with minimal effusion, no erythema, skin intact, able to do passive ROM 0-60 degrees, limited by pain. Light touch sensation intact, ankle dorsiflexion/plantarflexion intact, DP 1+/2 palpable.    Skin:     General: Skin is warm and dry.      Capillary Refill: Capillary refill takes less than 2 seconds.   Neurological:      General: No focal deficit present.      Mental Status: He is alert. Mental status is at baseline.   Psychiatric:         Mood and Affect: Mood normal.         Last Recorded Vitals  Blood pressure 158/75, pulse 65, temperature 36.3 °C (97.3 °F), temperature source Temporal, resp. rate 19, height 1.753 m (5' 9\"), weight 80 kg (176 lb 5.9 oz), SpO2 97 %.  Intake/Output last 3 Shifts:  I/O last 3 completed shifts:  In: 1525 (19.1 mL/kg) [P.O.:975; IV Piggyback:550]  Out: 1350 (16.9 mL/kg) [Urine:1350 (0.5 mL/kg/hr)]  Weight: 80 kg     Relevant Results      Scheduled medications  apixaban, 2.5 mg, oral, BID  isosorbide mononitrate ER, 60 mg, oral, BID  levothyroxine, 75 mcg, oral, Daily  lidocaine, 1 patch, transdermal, Daily  metoprolol tartrate, 50 mg, oral, " BID  nystatin, 1 Application, Topical, BID  oxygen, , inhalation, Continuous - Inhalation  polyethylene glycol, 17 g, oral, Daily  polymyxin B sulf-trimethoprim, 1 drop, Both Eyes, q4h BRITTON  psyllium, 1 packet, oral, Daily  sulfur hexafluoride microsphr, 2 mL, intravenous, Once in imaging  tamsulosin, 0.4 mg, oral, Daily      Continuous medications     PRN medications  PRN medications: acetaminophen, diclofenac sodium  Results for orders placed or performed during the hospital encounter of 03/17/24 (from the past 24 hour(s))   POCT GLUCOSE   Result Value Ref Range    POCT Glucose 119 (H) 74 - 99 mg/dL   POCT GLUCOSE   Result Value Ref Range    POCT Glucose 163 (H) 74 - 99 mg/dL   CBC   Result Value Ref Range    WBC 8.6 4.4 - 11.3 x10*3/uL    nRBC 0.0 0.0 - 0.0 /100 WBCs    RBC 3.65 (L) 4.50 - 5.90 x10*6/uL    Hemoglobin 11.4 (L) 13.5 - 17.5 g/dL    Hematocrit 37.0 (L) 41.0 - 52.0 %     (H) 80 - 100 fL    MCH 31.2 26.0 - 34.0 pg    MCHC 30.8 (L) 32.0 - 36.0 g/dL    RDW 13.3 11.5 - 14.5 %    Platelets 232 150 - 450 x10*3/uL   Magnesium   Result Value Ref Range    Magnesium 1.81 1.60 - 2.40 mg/dL   Renal Function Panel   Result Value Ref Range    Glucose 98 74 - 99 mg/dL    Sodium 135 (L) 136 - 145 mmol/L    Potassium 3.4 (L) 3.5 - 5.3 mmol/L    Chloride 101 98 - 107 mmol/L    Bicarbonate 24 21 - 32 mmol/L    Anion Gap 13 10 - 20 mmol/L    Urea Nitrogen 24 (H) 6 - 23 mg/dL    Creatinine 1.78 (H) 0.50 - 1.30 mg/dL    eGFR 35 (L) >60 mL/min/1.73m*2    Calcium 8.3 (L) 8.6 - 10.3 mg/dL    Phosphorus 2.9 2.5 - 4.9 mg/dL    Albumin 3.0 (L) 3.4 - 5.0 g/dL   POCT GLUCOSE   Result Value Ref Range    POCT Glucose 110 (H) 74 - 99 mg/dL               Electrocardiogram, 12-lead PRN ACS symptoms    Result Date: 3/18/2024  Atrial fibrillation with a competing junctional pacemaker Left axis deviation Left ventricular hypertrophy with QRS widening Possible Lateral infarct , age undetermined Inferior infarct , age undetermined  Abnormal ECG When compared with ECG of 17-MAR-2024 20:12, (unconfirmed) Atrial fibrillation has replaced Wide QRS rhythm    ECG 12 Lead    Result Date: 3/18/2024  Wide QRS rhythm Left axis deviation Left ventricular hypertrophy with QRS widening and repolarization abnormality Possible Lateral infarct , age undetermined Inferior infarct , age undetermined Abnormal ECG When compared with ECG of 09-JUL-2021 13:37, Wide QRS rhythm has replaced Sinus rhythm    XR chest 1 view    Result Date: 3/17/2024  Interpreted By:  Justine Hernandez, STUDY: Chest, single AP view.   INDICATION: Signs/Symptoms:Cough, rales, sob.   COMPARISON: 07/09/2021   ACCESSION NUMBER(S): [id number]   ORDERING CLINICIAN: PETR DWYER   FINDINGS: Left subclavian AICD/pacemaker device noted. The cardiac silhouette size is within normal limits. Bilateral small pleural effusions noted, right greater than left. Patchy opacities of the right lower lung which may represent pneumonia in appropriate clinical context. No pneumothorax. No acute osseous abnormality.       1. Patchy opacities of the right lower lung which may represent pneumonia in appropriate clinical context. Continued imaging follow-up is suggested in 8-10 weeks to document resolution/improvement. 2. Small bibasilar pleural effusions, right greater than left   MACRO: None.   Signed by: Justine Hernandez 3/17/2024 5:24 PM Dictation workstation:   [id number]    XR knee right 4+ views    Result Date: 3/17/2024  Interpreted By:  Justine Hernandez, STUDY: Right knee, 4 views.   INDICATION: Signs/Symptoms:Knee pain   COMPARISON: None.   ACCESSION NUMBER(S): [id number]   ORDERING CLINICIAN: PETR DWYER   FINDINGS: No acute fracture or malalignment. Mild medial and patellofemoral compartment degenerative changes with joint space loss and osteophytes.   Small knee joint effusion. Bones appear demineralized. Loose bodies are noted in the suprapatellar recess of the knee. Vascular  calcifications of the femoral and popliteal arteries noted. Mild soft tissue edema of the medial knee noted as well.       1. Mild medial and patellofemoral compartment osteoarthrosis. 2. Small knee joint effusion and soft tissue edema/swelling of the medial knee which is nonspecific.   MACRO: None.   Signed by: Justine Hernandez 3/17/2024 5:23 PM Dictation workstation:   [id number]                Assessment/Plan   Principal Problem:    Acute pain of right knee  Active Problems:    Anemia    CAD (coronary artery disease)    Dyslipidemia    Essential hypertension    Acquired hypothyroidism    Stage 3b chronic kidney disease (CMS/HCC)    Heart failure (CMS/HCC)    Right knee pain  -right knee xray: Mild medial and patellofemoral compartment osteoarthrosis. 2. Small knee joint effusion and soft tissue edema/swelling of the medial knee which is nonspecific.  -pt admitted with suspected pneumonia, has been on multiple antibiotics this admission  -aspiration done 3/18  -Cell count 23,500, no crystals  -gram stain negative, culture-no growth to date, will follow  -once cultures have finalized, will discuss options with patient and son       I spent 30 minutes in the professional and overall care of this patient.      Stephanie Vivar PA-C

## 2024-03-19 NOTE — CARE PLAN
Problem: Pain  Goal: My pain/discomfort is manageable  Outcome: Progressing     Problem: Safety  Goal: Patient will be injury free during hospitalization  Outcome: Progressing  Goal: I will remain free of falls  Outcome: Progressing     Problem: Daily Care  Goal: Daily care needs are met  Outcome: Progressing     Problem: Psychosocial Needs  Goal: Demonstrates ability to cope with hospitalization/illness  Outcome: Progressing  Goal: Collaborate with me, my family, and caregiver to identify my specific goals  Outcome: Progressing     Problem: Discharge Barriers  Goal: My discharge needs are met  Outcome: Progressing     Problem: Skin  Goal: Decreased wound size/increased tissue granulation at next dressing change  Outcome: Progressing  Goal: Participates in plan/prevention/treatment measures  Outcome: Progressing  Goal: Prevent/manage excess moisture  Outcome: Progressing  Goal: Prevent/minimize sheer/friction injuries  Outcome: Progressing  Flowsheets (Taken 3/19/2024 0604)  Prevent/minimize sheer/friction injuries: Turn/reposition every 2 hours/use positioning/transfer devices  Goal: Promote/optimize nutrition  Outcome: Progressing  Goal: Promote skin healing  Outcome: Progressing     Problem: Pain  Goal: Takes deep breaths with improved pain control throughout the shift  Outcome: Progressing  Goal: Turns in bed with improved pain control throughout the shift  Outcome: Progressing  Goal: Walks with improved pain control throughout the shift  Outcome: Progressing  Goal: Performs ADL's with improved pain control throughout shift  Outcome: Progressing  Goal: Participates in PT with improved pain control throughout the shift  Outcome: Progressing  Goal: Free from opioid side effects throughout the shift  Outcome: Progressing  Goal: Free from acute confusion related to pain meds throughout the shift  Outcome: Progressing   The patient's goals for the shift include      The clinical goals for the shift include Pt will  remain hemodynamically stable

## 2024-03-19 NOTE — PROGRESS NOTES
03/19/24 1545   Discharge Planning   Living Arrangements Spouse/significant other   Care Facility Name [facility]   Patient Choice   Provider Choice list and CMS website (https://medicare.gov/care-compare#search) for post-acute Quality and Resource Measure Data were provided and reviewed with: Family     Pt is forgetful at times per nursing staff. TC to son Nikko who provides information. Pt resides at [facility]. Has been there since September 2023. Pt uses a walker in his apartment and a power scooter to go to the dining room. The pt has assistance dressing but pays his own bills and manages his own medications. Pt can make simple meals in his apartment. Pt currently admitted with the flu. Therapy evals completed, ampac is 10 and recommendation is for moderate intensity therapy. Nikko would like a referral sent to [facility] but also requesting a [facility]F list emailed to him at [email or web address]. Sent by this [facility]. Yanni MANUEL sent [facility] referral. Awaiting response.

## 2024-03-19 NOTE — PROGRESS NOTES
Amari Boyer is a 89 y.o. male on day 2 of admission presenting with Acute pain of right knee.      Subjective   Overnight team received a complaint regarding his knee.  He did not want any pain medication but was agreeable to Voltaren gel.    Upon examination this morning, he he was feeling worse.  He had concerns that he was not breathing appropriately although his stats were in the upper 90s.  After rounds his son was bedside and expressed some concern regarding his dad's chronic condition such as his lymphedema and concern for pressure wounds.  A plan was discussed bedside with both the patient and his son and they were agreeable to what was proposed.       Objective     Last Recorded Vitals  /75 (BP Location: Left arm, Patient Position: Lying)   Pulse 65   Temp 36.3 °C (97.3 °F) (Temporal)   Resp 19   Wt 80 kg (176 lb 5.9 oz)   SpO2 97% Comment: 2LNC  Intake/Output last 3 Shifts:    Intake/Output Summary (Last 24 hours) at 3/19/2024 1430  Last data filed at 3/19/2024 1124  Gross per 24 hour   Intake 200 ml   Output 1102 ml   Net -902 ml       Admission Weight  Weight: 81.6 kg (180 lb) (03/17/24 1526)    Daily Weight  03/19/24 : 80 kg (176 lb 5.9 oz)    Image Results  Electrocardiogram, 12-lead PRN ACS symptoms  Atrial fibrillation with a competing junctional pacemaker  Left axis deviation  Left ventricular hypertrophy with QRS widening  Possible Lateral infarct , age undetermined  Inferior infarct , age undetermined  Abnormal ECG  When compared with ECG of 17-MAR-2024 20:12, (unconfirmed)  Atrial fibrillation has replaced Wide QRS rhythm  ECG 12 Lead  Wide QRS rhythm  Left axis deviation  Left ventricular hypertrophy with QRS widening and repolarization abnormality  Possible Lateral infarct , age undetermined  Inferior infarct , age undetermined  Abnormal ECG  When compared with ECG of 09-JUL-2021 13:37,  Wide QRS rhythm has replaced Sinus rhythm      Physical Exam  Constitutional:        Appearance: He is ill-appearing.   HENT:      Mouth/Throat:      Mouth: Mucous membranes are moist.      Pharynx: Oropharynx is clear.   Cardiovascular:      Rate and Rhythm: Normal rate and regular rhythm.   Pulmonary:      Comments: On 2 liters  Abdominal:      General: Abdomen is flat. Bowel sounds are normal.      Palpations: Abdomen is soft.   Musculoskeletal:      Right lower leg: Edema present.      Left lower leg: Edema present.   Skin:     General: Skin is warm and dry.   Neurological:      General: No focal deficit present.   Psychiatric:         Mood and Affect: Mood normal.         Thought Content: Thought content normal.         Judgment: Judgment normal.         Relevant Results  Scheduled medications  apixaban, 2.5 mg, oral, BID  isosorbide mononitrate ER, 60 mg, oral, BID  levothyroxine, 75 mcg, oral, Daily  lidocaine, 1 patch, transdermal, Daily  metoprolol tartrate, 50 mg, oral, BID  nystatin, 1 Application, Topical, BID  oxygen, , inhalation, Continuous - Inhalation  polyethylene glycol, 17 g, oral, Daily  polymyxin B sulf-trimethoprim, 1 drop, Both Eyes, q4h BRITTON  psyllium, 1 packet, oral, Daily  sulfur hexafluoride microsphr, 2 mL, intravenous, Once in imaging  tamsulosin, 0.4 mg, oral, Daily      Continuous medications     PRN medications  PRN medications: acetaminophen, diclofenac sodium    Electrocardiogram, 12-lead PRN ACS symptoms    Result Date: 3/18/2024  Atrial fibrillation with a competing junctional pacemaker Left axis deviation Left ventricular hypertrophy with QRS widening Possible Lateral infarct , age undetermined Inferior infarct , age undetermined Abnormal ECG When compared with ECG of 17-MAR-2024 20:12, (unconfirmed) Atrial fibrillation has replaced Wide QRS rhythm    ECG 12 Lead    Result Date: 3/18/2024  Wide QRS rhythm Left axis deviation Left ventricular hypertrophy with QRS widening and repolarization abnormality Possible Lateral infarct , age undetermined Inferior infarct ,  age undetermined Abnormal ECG When compared with ECG of 09-JUL-2021 13:37, Wide QRS rhythm has replaced Sinus rhythm    XR chest 1 view    Result Date: 3/17/2024  Interpreted By:  Justine Hernandez, STUDY: Chest, single AP view.   INDICATION: Signs/Symptoms:Cough, rales, sob.   COMPARISON: 07/09/2021   ACCESSION NUMBER(S): [id number]   ORDERING CLINICIAN: PETR DWYER   FINDINGS: Left subclavian AICD/pacemaker device noted. The cardiac silhouette size is within normal limits. Bilateral small pleural effusions noted, right greater than left. Patchy opacities of the right lower lung which may represent pneumonia in appropriate clinical context. No pneumothorax. No acute osseous abnormality.       1. Patchy opacities of the right lower lung which may represent pneumonia in appropriate clinical context. Continued imaging follow-up is suggested in 8-10 weeks to document resolution/improvement. 2. Small bibasilar pleural effusions, right greater than left   MACRO: None.   Signed by: Justine Hernandez 3/17/2024 5:24 PM Dictation workstation:   [id number]    XR knee right 4+ views    Result Date: 3/17/2024  Interpreted By:  Justine Hernandez, STUDY: Right knee, 4 views.   INDICATION: Signs/Symptoms:Knee pain   COMPARISON: None.   ACCESSION NUMBER(S): [id number]   ORDERING CLINICIAN: PETR DWYER   FINDINGS: No acute fracture or malalignment. Mild medial and patellofemoral compartment degenerative changes with joint space loss and osteophytes.   Small knee joint effusion. Bones appear demineralized. Loose bodies are noted in the suprapatellar recess of the knee. Vascular calcifications of the femoral and popliteal arteries noted. Mild soft tissue edema of the medial knee noted as well.       1. Mild medial and patellofemoral compartment osteoarthrosis. 2. Small knee joint effusion and soft tissue edema/swelling of the medial knee which is nonspecific.   MACRO: None.   Signed by: Justine Hernandez 3/17/2024 5:23  PM Dictation workstation:   [id number]     Results for orders placed or performed during the hospital encounter of 03/17/24 (from the past 24 hour(s))   POCT GLUCOSE   Result Value Ref Range    POCT Glucose 119 (H) 74 - 99 mg/dL   POCT GLUCOSE   Result Value Ref Range    POCT Glucose 163 (H) 74 - 99 mg/dL   CBC   Result Value Ref Range    WBC 8.6 4.4 - 11.3 x10*3/uL    nRBC 0.0 0.0 - 0.0 /100 WBCs    RBC 3.65 (L) 4.50 - 5.90 x10*6/uL    Hemoglobin 11.4 (L) 13.5 - 17.5 g/dL    Hematocrit 37.0 (L) 41.0 - 52.0 %     (H) 80 - 100 fL    MCH 31.2 26.0 - 34.0 pg    MCHC 30.8 (L) 32.0 - 36.0 g/dL    RDW 13.3 11.5 - 14.5 %    Platelets 232 150 - 450 x10*3/uL   Magnesium   Result Value Ref Range    Magnesium 1.81 1.60 - 2.40 mg/dL   Renal Function Panel   Result Value Ref Range    Glucose 98 74 - 99 mg/dL    Sodium 135 (L) 136 - 145 mmol/L    Potassium 3.4 (L) 3.5 - 5.3 mmol/L    Chloride 101 98 - 107 mmol/L    Bicarbonate 24 21 - 32 mmol/L    Anion Gap 13 10 - 20 mmol/L    Urea Nitrogen 24 (H) 6 - 23 mg/dL    Creatinine 1.78 (H) 0.50 - 1.30 mg/dL    eGFR 35 (L) >60 mL/min/1.73m*2    Calcium 8.3 (L) 8.6 - 10.3 mg/dL    Phosphorus 2.9 2.5 - 4.9 mg/dL    Albumin 3.0 (L) 3.4 - 5.0 g/dL   POCT GLUCOSE   Result Value Ref Range    POCT Glucose 110 (H) 74 - 99 mg/dL         Assessment/Plan        Principal Problem:    Acute pain of right knee  Active Problems:    Anemia    CAD (coronary artery disease)    Dyslipidemia    Essential hypertension    Acquired hypothyroidism    Stage 3b chronic kidney disease (CMS/HCC)    Heart failure (CMS/HCC)    Amari Boyer is a 89 y.o. male presents with weakness, productive cough and right knee pain. Past medical history of CAD s/p CABG (3 vessel in the past with LIMA-LAD, VG-OM, and VG-RCA in 1993) with known occluded VG-RCA, dyslipidemia, hypertension, lymphedema, remote stroke, sick sinus syndrome status post pacer in situ, chronic diastolic heart failure, hypothyroidism and  atrial fibrillation.  Patient was positive for the flu 10 days ago and received 5 days of Tamiflu however failed to improve.  Chest x-ray shows patchy infiltrates along with a right greater than left pleural effusion.  Was initially started on Rocephin, doxycycline for concern of community-acquired pneumonia however procalcitonin is 0.20, afebrile and no leukocytosis.  Patient was responsive to IV Lasix.  Therefore on day 2 of admission antibiotics were de-escalated as the clinical picture is more suggestive of congestive heart failure.     #CHF exacerbation 2/2 influenza   #Bibasilar pleural effusions (right greater than left)  #Flu A positive  CXR with evidence of vascular congestion, and small bibasilar pleural effusions right greater than left  Last echo 7/2021: LVEF of 50%, borderline increased LV mass, impaired relaxation pattern of LV DF, elevated left ventricular and end-diastolic pressure, slightly elevated RVSP, aortic valve sclerosis, mild AR, global hypokinesis of left ventricle with minor regional variations.  Patient has not been taking his as needed Lasix, and has not been taking for over a month secondary to increased diuresis and inability to ambulate to the restroom in timely manner  Initial concern for possibile superimposed bacterial infection over influenza pneumonia so patient was started on Ceftriaxone and doxycyline and dc'd on day 2; Procal 0.20.  -BNP of 1200 in the setting of CKD  -Given 40 mg IV Lasix given at admission with symptom improvement, another 40mg administered today  -Strict I's and O's  -Urine antigens pending  -Incentive spirometry   -O2 added for comfort, will dc once patient diureses today     # Right knee effusion C/F gout versus less likely septic arthritis  # Knee osteoarthrosis  Elevated ESR and CRP. Afebrile and no leukocytosis  Uric acid normal, no hx of gout  X-ray of the right knee with mild medial and patellofemoral compartment osteoarthrosis, small knee joint  effusion and soft tissue swelling of the medial knee.  Fluid aspiration showed =  99% neutrophils, 100 RBCs, 45742 WBC  -Ortho consulted = performed aspiration, will await cultures to determine further intervention  -Gram stain pending  -Adequate pain control  -MRSA swab pending, will add coverage if needed  -Blood culture pending     #Conjunctivitis   - Polytrim eyedrops     #Chronic Conditions  CAD s/p CABG - Imdur  Dyslipidemia  Hypertension  lymphedema  H/o CVA  sick sinus syndrome status post pacer in situ  Hypothyroidism - levothyroxine  Atrial fibrillation - Eliquis held, metoprolol  BPH - flomax     DVT: Eliquis  Bowel Regimen: miralax  CODE: DNR/DNI  Disposition: SNF recommended     Assessment and plan to be discussed with senior resident and attending physician.   Cari Jaramillo D.O.  PGY-1, Internal Medicine   - [facility] [place]              Cari Jaramillo DO

## 2024-03-19 NOTE — PROGRESS NOTES
Physical Therapy    Physical Therapy Evaluation    Patient Name: Amari Boyer  MRN: [id number]  Today's Date: 3/19/2024   Time Calculation  Start Time: 1241  Stop Time: 1305  Time Calculation (min): 24 min  3124    Assessment/Plan   PT Assessment: Pt demonstrates decreased strength, increased pain, decreased activity tolerance, and impaired balance/mobility.  Pt appears below baseline level of function and based on current level of function, pt would benefit from continued skilled therapy while in the hospital to ensure safety, decrease risk of falls, and regains strength/mobility back to baseline.  At this time, pt is unsafe to return home and once stable enough for discharge, pt would benefit from moderate intensity therapy prior to returning home.    PT Assessment Results: Decreased strength, Decreased range of motion, Impaired balance, Decreased mobility, Impaired judgement, Decreased safety awareness, Pain  Rehab Prognosis: Good  Evaluation/Treatment Tolerance: Patient limited by fatigue, Patient limited by pain  Medical Staff Made Aware: Yes  End of Session Communication: Bedside nurse  End of Session Patient Position: Bed, 3 rail up, Alarm on  IP OR SWING BED PT PLAN  Inpatient or Swing Bed: Inpatient  PT Plan  Treatment/Interventions: Bed mobility, Transfer training, Gait training, Balance training, Neuromuscular re-education, Strengthening, Range of motion, Therapeutic exercise, Therapeutic activity  PT Plan: Skilled PT  PT Frequency: 3 times per week  PT Discharge Recommendations: Moderate intensity level of continued care  Equipment Recommended upon Discharge: Wheeled walker  PT Recommended Transfer Status: Assist x2 (Max A)  PT - OK to Discharge: Yes - To next level of care when cleared by medical team    Subjective     Current Problem:  Patient Active Problem List   Diagnosis    Unsteady gait    Abnormal posture    Anemia    Bruit of right carotid artery    CAD (coronary artery disease)    Cardiac  pacemaker    Cerebral hemorrhage (CMS/HCC)    Chronic diastolic heart failure (CMS/HCC)    Constipation    Slow transit constipation    Decreased coordination    Dyslipidemia    Edema    Elevated blood sugar    Epistaxis    Essential hypertension    Hematochezia    Acquired hypothyroidism    Left-sided weakness    Lymphedema of both lower extremities    Paroxysmal atrial fibrillation (CMS/HCC)    Polyneuropathy associated with underlying disease (CMS/HCC)    Rash    Rectal bleeding    S/P CABG x 3    Sick sinus syndrome due to SA node dysfunction (CMS/HCC)    Skin texture changes    Stage 3b chronic kidney disease (CMS/HCC)    Thoracic vertebral fracture (CMS/HCC)    Cerebrovascular accident (CVA) due to occlusion of cerebral artery (CMS/HCC)    TIA (transient ischemic attack)    Vasomotor rhinitis    Weakness    Chest pain    Dyspnea on exertion    Iron deficiency anemia secondary to inadequate dietary iron intake    Overweight    Squamous cell cancer of skin of ala nasi    Right sided weakness    Exudative age-related macular degeneration, right eye, with active choroidal neovascularization (CMS/HCC)    Hemiplegia and hemiparesis following cerebral infarction affecting left non-dominant side (CMS/HCC)    Chronic obstructive pulmonary disease, unspecified COPD type (CMS/HCC)    Type 2 diabetes mellitus with chronic kidney disease, without long-term current use of insulin, unspecified CKD stage (CMS/HCC)    Secondary hyperparathyroidism of renal origin (CMS/HCC)    Stiffness of both ankle joints    Acute pain of right knee    Heart failure (CMS/HCC)       General Visit Information:  Per EMR: pt presenting with generalized weakness and right-sided knee pain. Patient accompanied by family to provide additional history.  Per family patient's baseline is that he is normally fairly independent only requiring assistance with getting dressed in the morning and at night, and requires some supervision during showers  otherwise requires no other assistance.  He had the flu 10 days ago and was treated with a 5-day course of Tamiflu but reports that he has not been feeling like his generalized weakness has been improving and in fact yesterday he reports that he had excruciating knee pain causing him to be unable to move his knee regardless of weightbearing status, normally he is able to bear weight on that knee. No history of septic joint or knee replacements.  He does have a history of lymphedema after his CVA but reports that normally his right leg is stronger.  Normally ambulates via walker or scooter.  He stopped taking his Lasix 1 month ago was he reported that it was becoming difficult for him to make it to the bathroom in time.  Reports that he wears depends diapers and that they do irritate his skin.  Reports that he is more congested and has green sputum production when he coughs.     General: On arrival, pt supine in bed with son at bedside.  Pt in no apparent distress and agreeable to therapy.    Reason for Referral: impaired mobility, gait training  Referred By: Ling Hernández  Past Medical History Relevant to Rehab: CVA with L sided weakness (2021), A fib, CHF, CKD, CAD s/p CABG, pacemaker, HF, COPD, DM, lymphedema  Family/Caregiver Present: Yes  Caregiver Feedback: son at bedside  Prior to Session Communication: Bedside nurse  Patient Position Received: Bed, 3 rail up, Alarm on    Home Living/PLOF:  Pt is from [facility] since Sept 2023.  Some history obtained from son at bedside.  Pt gets assist from daughter for showers and aides assist with dressing.  Pt was Mod I with mobility up until~7 days ago when he started not feeling well.  Uses FWW in his apt and motorized scooter to dining room for meals or other prolonged distances.  Pt has lift chair in which he uses lift function.  No O2 needs at baseline.    Precautions:  Precautions  Medical Precautions: Fall precautions  Precautions Comment:  (RLE WBAT),  (+) flu, droplet precautions    Vital Signs:  Vital Signs  SpO2: 97 % (2LNC)  Objective     Pain:  Pain Assessment  Pain Assessment: 0-10  Pain Score:  (pt initially denies any pain but c/o R knee pain in sitting)    Cognition:  Cognition  Overall Cognitive Status: Within Functional Limits  Orientation Level: Oriented X4    General Assessments:      Activity Tolerance  Endurance: Decreased tolerance for upright activites  Sensation  Sensation Comment: denies any n/t    Static Sitting Balance  Static Sitting-Comment/Number of Minutes: fair  Dynamic Sitting Balance  Dynamic Sitting-Comments: fair minus       Functional Assessments:  Bed Mobility   Supine to sit: Max A x2; pt able to minimally move BLE towards EOB with assist, draw sheet used for transfer  Sit to supine: Max A x2; draw sheet used for transfer  Boost in supine: Dep A x2    Transfers/Ambulation  Pt refused to attempt further transfers secondary to pain and fear of falling despite reassurance of safety    Extremity/Trunk Assessments:  LLE: moderate ankle inversion at rest; pt able to wiggle toes, pump ankle, and perform partial SLR  RLE: able to wiggle toes, pump ankle, and perform minimal SLR; (+) R knee domi with SLR attempt     Outcome Measures:  [facility] Basic Mobility  Turning from your back to your side while in a flat bed without using bedrails: A lot  Moving from lying on your back to sitting on the side of a flat bed without using bedrails: A lot  Moving to and from bed to chair (including a wheelchair): A lot  Standing up from a chair using your arms (e.g. wheelchair or bedside chair): A lot  To walk in hospital room: Total  Climbing 3-5 steps with railing: Total  Basic Mobility - Total Score: 10    Goals:  Encounter Problems       Encounter Problems (Active)       PT Problem       Pt will be able to perform all bed mobility tasks with Min-Mod A.  (Progressing)       Start:  03/19/24    Expected End:  04/02/24            Pt will perform all  transfers with Mod A and FWW with proper safety mechanics.   (Progressing)       Start:  03/19/24    Expected End:  04/02/24            Pt will ambulate 30 ft with Mod A using FWW for improved functional independence.  (Progressing)       Start:  03/19/24    Expected End:  04/02/24            Pt will demonstrate good dynamic sit balance for completion of therapeutic exercises and functional tasks.  (Progressing)       Start:  03/19/24    Expected End:  04/02/24                 Education Documentation  Body Mechanics, taught by Adamaris Gray, PT at 3/19/2024  1:51 PM.  Learner: Patient  Readiness: Acceptance  Method: Explanation  Response: Verbalizes Understanding    Home Exercise Program, taught by Adamaris Gray, PT at 3/19/2024  1:51 PM.  Learner: Patient  Readiness: Acceptance  Method: Explanation  Response: Verbalizes Understanding    Mobility Training, taught by Adamaris Gray, PT at 3/19/2024  1:51 PM.  Learner: Patient  Readiness: Acceptance  Method: Explanation  Response: Verbalizes Understanding    Education Comments  No comments found.

## 2024-03-20 NOTE — PROGRESS NOTES
"Amari Boyer is a 89 y.o. male  presenting with Acute pain of right knee.    Subjective   Mr. Boyer reports mild right knee discomfort. He was able to have PT evaluation yesterday.        Objective     Vitals reviewed.   HENT:      Head: Normocephalic.      Mouth/Throat:      Mouth: Mucous membranes are moist.      Pharynx: Oropharynx is clear.   Eyes:      Extraocular Movements: Extraocular movements intact.   Cardiovascular:      Rate and Rhythm: Normal rate.   Pulmonary:      Effort: Pulmonary effort is normal.   Abdominal:      Palpations: Abdomen is soft.   Musculoskeletal:      Comments: Right knee with minimal effusion, no erythema, skin intact, able to do passive ROM 0-60 degrees, limited by pain. Light touch sensation intact, ankle dorsiflexion/plantarflexion intact, DP 1+/2 palpable.    Skin:     General: Skin is warm and dry.      Capillary Refill: Capillary refill takes less than 2 seconds.   Neurological:      General: No focal deficit present.      Mental Status: He is alert. Mental status is at baseline.   Psychiatric:         Mood and Affect: Mood normal.     Last Recorded Vitals  Blood pressure 140/68, pulse 65, temperature 36.4 °C (97.5 °F), temperature source Temporal, resp. rate 24, height 1.753 m (5' 9\"), weight 77.8 kg (171 lb 8.3 oz), SpO2 96 %.  Intake/Output last 3 Shifts:  I/O last 3 completed shifts:  In: 440 (5.7 mL/kg) [P.O.:240; IV Piggyback:200]  Out: 2602 (33.4 mL/kg) [Urine:2600 (0.9 mL/kg/hr); Stool:2]  Weight: 77.8 kg     Relevant Results      Scheduled medications  apixaban, 2.5 mg, oral, BID  hydrOXYzine HCL, 25 mg, oral, Nightly  ipratropium-albuteroL, 3 mL, nebulization, TID  isosorbide mononitrate ER, 60 mg, oral, BID  levothyroxine, 75 mcg, oral, Daily  lidocaine, 1 patch, transdermal, Daily  metoprolol tartrate, 50 mg, oral, BID  nystatin, 1 Application, Topical, BID  oxygen, , inhalation, Continuous - Inhalation  polyethylene glycol, 17 g, oral, Daily  psyllium, 1 " packet, oral, Daily  sulfur hexafluoride microsphr, 2 mL, intravenous, Once in imaging  tamsulosin, 0.4 mg, oral, Daily      Continuous medications     PRN medications  PRN medications: acetaminophen, diclofenac sodium, ipratropium-albuteroL  Results for orders placed or performed during the hospital encounter of 03/17/24 (from the past 24 hour(s))   CBC   Result Value Ref Range    WBC 7.6 4.4 - 11.3 x10*3/uL    nRBC 0.0 0.0 - 0.0 /100 WBCs    RBC 3.59 (L) 4.50 - 5.90 x10*6/uL    Hemoglobin 11.4 (L) 13.5 - 17.5 g/dL    Hematocrit 35.8 (L) 41.0 - 52.0 %     80 - 100 fL    MCH 31.8 26.0 - 34.0 pg    MCHC 31.8 (L) 32.0 - 36.0 g/dL    RDW 13.4 11.5 - 14.5 %    Platelets 267 150 - 450 x10*3/uL   Magnesium   Result Value Ref Range    Magnesium 1.75 1.60 - 2.40 mg/dL   Renal Function Panel   Result Value Ref Range    Glucose 89 74 - 99 mg/dL    Sodium 135 (L) 136 - 145 mmol/L    Potassium 3.3 (L) 3.5 - 5.3 mmol/L    Chloride 100 98 - 107 mmol/L    Bicarbonate 25 21 - 32 mmol/L    Anion Gap 13 10 - 20 mmol/L    Urea Nitrogen 28 (H) 6 - 23 mg/dL    Creatinine 1.90 (H) 0.50 - 1.30 mg/dL    eGFR 32 (L) >60 mL/min/1.73m*2    Calcium 8.2 (L) 8.6 - 10.3 mg/dL    Phosphorus 3.0 2.5 - 4.9 mg/dL    Albumin 2.9 (L) 3.4 - 5.0 g/dL   POCT GLUCOSE   Result Value Ref Range    POCT Glucose 109 (H) 74 - 99 mg/dL               Electrocardiogram, 12-lead PRN ACS symptoms    Result Date: 3/18/2024  Atrial fibrillation with a competing junctional pacemaker Left axis deviation Left ventricular hypertrophy with QRS widening Possible Lateral infarct , age undetermined Inferior infarct , age undetermined Abnormal ECG When compared with ECG of 17-MAR-2024 20:12, (unconfirmed) Atrial fibrillation has replaced Wide QRS rhythm    ECG 12 Lead    Result Date: 3/18/2024  Wide QRS rhythm Left axis deviation Left ventricular hypertrophy with QRS widening and repolarization abnormality Possible Lateral infarct , age undetermined Inferior infarct ,  age undetermined Abnormal ECG When compared with ECG of 09-JUL-2021 13:37, Wide QRS rhythm has replaced Sinus rhythm    XR chest 1 view    Result Date: 3/17/2024  Interpreted By:  Justine Hernandez, STUDY: Chest, single AP view.   INDICATION: Signs/Symptoms:Cough, rales, sob.   COMPARISON: 07/09/2021   ACCESSION NUMBER(S): [id number]   ORDERING CLINICIAN: PETR DWYER   FINDINGS: Left subclavian AICD/pacemaker device noted. The cardiac silhouette size is within normal limits. Bilateral small pleural effusions noted, right greater than left. Patchy opacities of the right lower lung which may represent pneumonia in appropriate clinical context. No pneumothorax. No acute osseous abnormality.       1. Patchy opacities of the right lower lung which may represent pneumonia in appropriate clinical context. Continued imaging follow-up is suggested in 8-10 weeks to document resolution/improvement. 2. Small bibasilar pleural effusions, right greater than left   MACRO: None.   Signed by: Justine Hernandez 3/17/2024 5:24 PM Dictation workstation:   [id number]    XR knee right 4+ views    Result Date: 3/17/2024  Interpreted By:  Justine Hernandez, STUDY: Right knee, 4 views.   INDICATION: Signs/Symptoms:Knee pain   COMPARISON: None.   ACCESSION NUMBER(S): [id number]   ORDERING CLINICIAN: PETR DWYER   FINDINGS: No acute fracture or malalignment. Mild medial and patellofemoral compartment degenerative changes with joint space loss and osteophytes.   Small knee joint effusion. Bones appear demineralized. Loose bodies are noted in the suprapatellar recess of the knee. Vascular calcifications of the femoral and popliteal arteries noted. Mild soft tissue edema of the medial knee noted as well.       1. Mild medial and patellofemoral compartment osteoarthrosis. 2. Small knee joint effusion and soft tissue edema/swelling of the medial knee which is nonspecific.   MACRO: None.   Signed by: Justine Hernandez 3/17/2024 5:23  PM Dictation workstation:   [id number]                Assessment/Plan   Principal Problem:    Acute pain of right knee  Active Problems:    Anemia    CAD (coronary artery disease)    Dyslipidemia    Essential hypertension    Acquired hypothyroidism    Stage 3b chronic kidney disease (CMS/HCC)    Heart failure (CMS/HCC)    Right knee pain  -right knee xray: Mild medial and patellofemoral compartment osteoarthrosis. 2. Small knee joint effusion and soft tissue edema/swelling of the medial knee which is nonspecific.  -pt admitted with suspected pneumonia, has been on multiple antibiotics this admission  -aspiration done 3/18  -Cell count 23,500, no crystals  -gram stain negative, culture-no growth to date, preliminary result, will follow  -once cultures have finalized, will discuss options with patient and son, including possible steroid injection  -Seen on am rounds with Dr. King          I spent 25 minutes in the professional and overall care of this patient.      Stephanie Vivar PA-C       Dr. Alcala [phone]

## 2024-03-20 NOTE — NURSING NOTE
Upon assessment, Mr. Boyer was found to be tachypneic. Respirations approximately 34 BPM. Maintained on 2 liters via nasal canula. HOB elevated 45 degrees. Oxygenation adequate at 98%. Lungs clear to auscultation bilaterally but diminished. Dr. Amari Grullon made aware of tachypnea via secure chat and indicated he would not like to start DuoNeb at this time and to continue to monitor patient. Instructed to reassess Mr. Byoer in 15 minutes and notify him if patient desaturates.    Kimberley Dubois RN

## 2024-03-20 NOTE — PROGRESS NOTES
Amari Boyer is a 89 y.o. male on day 3 of admission presenting with Acute pain of right knee.      Subjective   Reports diarrhea overnight.  Very concerned about his cough.  Expressed worry about not being able to get back to his baseline and admits to experiencing anxiety and taking short shallow breaths.  Asked for help with sleep this evening.       Objective     Last Recorded Vitals  /68 (BP Location: Left arm, Patient Position: Lying)   Pulse 65   Temp 36.4 °C (97.5 °F) (Temporal)   Resp 24   Wt 77.8 kg (171 lb 8.3 oz)   SpO2 96%   Intake/Output last 3 Shifts:    Intake/Output Summary (Last 24 hours) at 3/20/2024 1335  Last data filed at 3/20/2024 0603  Gross per 24 hour   Intake 240 ml   Output 1500 ml   Net -1260 ml       Admission Weight  Weight: 81.6 kg (180 lb) (03/17/24 1526)    Daily Weight  03/20/24 : 77.8 kg (171 lb 8.3 oz)    Image Results  Electrocardiogram, 12-lead PRN ACS symptoms  Atrial fibrillation with a competing junctional pacemaker  Left axis deviation  Left ventricular hypertrophy with QRS widening  Possible Lateral infarct , age undetermined  Inferior infarct , age undetermined  Abnormal ECG  When compared with ECG of 17-MAR-2024 20:12, (unconfirmed)  Atrial fibrillation has replaced Wide QRS rhythm  ECG 12 Lead  Wide QRS rhythm  Left axis deviation  Left ventricular hypertrophy with QRS widening and repolarization abnormality  Possible Lateral infarct , age undetermined  Inferior infarct , age undetermined  Abnormal ECG  When compared with ECG of 09-JUL-2021 13:37,  Wide QRS rhythm has replaced Sinus rhythm      Physical Exam  Constitutional:       Appearance: He is normal weight.   HENT:      Mouth/Throat:      Mouth: Mucous membranes are moist.      Pharynx: Oropharynx is clear.   Eyes:      Conjunctiva/sclera: Conjunctivae normal.      Comments: Discharge no longer seeing in eyes   Cardiovascular:      Rate and Rhythm: Normal rate and regular rhythm.      Pulses:  Normal pulses.      Heart sounds: Normal heart sounds.   Pulmonary:      Breath sounds: No wheezing, rhonchi or rales.      Comments: Shallow breaths  Abdominal:      General: Abdomen is flat. Bowel sounds are normal.   Musculoskeletal:      Right lower leg: Edema present.      Left lower leg: Edema present.   Neurological:      Mental Status: He is alert and oriented to person, place, and time. Mental status is at baseline.   Psychiatric:      Comments: Depressed mood         Relevant Results  Scheduled medications  apixaban, 2.5 mg, oral, BID  hydrOXYzine HCL, 25 mg, oral, Nightly  ipratropium-albuteroL, 3 mL, nebulization, TID  isosorbide mononitrate ER, 60 mg, oral, BID  levothyroxine, 75 mcg, oral, Daily  lidocaine, 1 patch, transdermal, Daily  metoprolol tartrate, 50 mg, oral, BID  nystatin, 1 Application, Topical, BID  oxygen, , inhalation, Continuous - Inhalation  sulfur hexafluoride microsphr, 2 mL, intravenous, Once in imaging  tamsulosin, 0.4 mg, oral, Daily      Continuous medications     PRN medications  PRN medications: acetaminophen, diclofenac sodium, ipratropium-albuteroL, loperamide, melatonin    Results for orders placed or performed during the hospital encounter of 03/17/24 (from the past 24 hour(s))   CBC   Result Value Ref Range    WBC 7.6 4.4 - 11.3 x10*3/uL    nRBC 0.0 0.0 - 0.0 /100 WBCs    RBC 3.59 (L) 4.50 - 5.90 x10*6/uL    Hemoglobin 11.4 (L) 13.5 - 17.5 g/dL    Hematocrit 35.8 (L) 41.0 - 52.0 %     80 - 100 fL    MCH 31.8 26.0 - 34.0 pg    MCHC 31.8 (L) 32.0 - 36.0 g/dL    RDW 13.4 11.5 - 14.5 %    Platelets 267 150 - 450 x10*3/uL   Magnesium   Result Value Ref Range    Magnesium 1.75 1.60 - 2.40 mg/dL   Renal Function Panel   Result Value Ref Range    Glucose 89 74 - 99 mg/dL    Sodium 135 (L) 136 - 145 mmol/L    Potassium 3.3 (L) 3.5 - 5.3 mmol/L    Chloride 100 98 - 107 mmol/L    Bicarbonate 25 21 - 32 mmol/L    Anion Gap 13 10 - 20 mmol/L    Urea Nitrogen 28 (H) 6 - 23 mg/dL     Creatinine 1.90 (H) 0.50 - 1.30 mg/dL    eGFR 32 (L) >60 mL/min/1.73m*2    Calcium 8.2 (L) 8.6 - 10.3 mg/dL    Phosphorus 3.0 2.5 - 4.9 mg/dL    Albumin 2.9 (L) 3.4 - 5.0 g/dL   POCT GLUCOSE   Result Value Ref Range    POCT Glucose 109 (H) 74 - 99 mg/dL   POCT GLUCOSE   Result Value Ref Range    POCT Glucose 121 (H) 74 - 99 mg/dL      No results found.      Assessment/Plan        Principal Problem:    Acute pain of right knee  Active Problems:    Anemia    CAD (coronary artery disease)    Dyslipidemia    Essential hypertension    Acquired hypothyroidism    Stage 3b chronic kidney disease (CMS/[facility])    Heart failure (CMS/[facility])    Amari Boyer is a 89 y.o. male presents with weakness, productive cough and right knee pain. Past medical history of CAD s/p CABG (3 vessel in the past with LIMA-LAD, VG-OM, and VG-RCA in 1993) with known occluded VG-RCA, dyslipidemia, hypertension, lymphedema, remote stroke, sick sinus syndrome status post pacer in situ, chronic diastolic heart failure, hypothyroidism and atrial fibrillation.  Patient was positive for the flu 10 days ago and received 5 days of Tamiflu however failed to improve.  Chest x-ray shows patchy infiltrates along with a right greater than left pleural effusion.  Was initially started on Rocephin, doxycycline for concern of community-acquired pneumonia however procalcitonin is 0.20, afebrile and no leukocytosis.  Patient was responsive to IV Lasix.  Therefore on day 2 of admission antibiotics were de-escalated as the clinical picture is more suggestive of congestive heart failure.     #CHF exacerbation 2/2 influenza   #Bibasilar pleural effusions (right greater than left)  #Flu A positive  CXR with evidence of vascular congestion, and small bibasilar pleural effusions right greater than left  Last echo 7/2021: LVEF of 50%, borderline increased LV mass, impaired relaxation pattern of LV DF, elevated left ventricular and end-diastolic pressure, slightly elevated  RVSP, aortic valve sclerosis, mild AR, global hypokinesis of left ventricle with minor regional variations.  Patient has not been taking his as needed Lasix, and has not been taking for over a month secondary to increased diuresis and inability to ambulate to the restroom in timely manner  Initial concern for possibile superimposed bacterial infection over influenza pneumonia so patient was started on Ceftriaxone and doxycyline and dc'd on day 2; Procal 0.20; Legionella/strep pneumo negative; MRSA swab positive for MSSA  BNP of 1200 in the setting of CKD  -Additional 40mg IV lasix today  -Strict I's and O's  -Incentive spirometry  -O2 added for comfort, will     # Right knee effusion C/F gout versus less likely septic arthritis  # Knee osteoarthrosis  Elevated ESR and CRP. Afebrile and no leukocytosis  Uric acid normal, no hx of gout  X-ray of the right knee with mild medial and patellofemoral compartment osteoarthrosis, small knee joint effusion and soft tissue swelling of the medial knee.  Fluid aspiration showed =  99% neutrophils, 100 RBCs, 32088 WBC  -Ortho consulted = performed aspiration, will await cultures to determine further intervention  -Gram stain NGTD  -Adequate pain control  -Blood culture no growth in 48hrs    #Diarrhea, most likely secondary to antibiotic use  - discontinued bowel regimen  - Imodium   - Will monitor for signs of dehydration or electrolyte abnormalities    #Anxiety  #Insomnia  - Atarax at night  - melatonin PRN     #Conjunctivitis, resolved  - Polytrim eyedrops stopped (3/18-3/21)     #Chronic Conditions  CAD s/p CABG - Imdur  Dyslipidemia  Hypertension  lymphedema  H/o CVA  sick sinus syndrome status post pacer in situ  Hypothyroidism - levothyroxine  Atrial fibrillation - Eliquis held, metoprolol  BPH - flomax     DVT: Eliquis  Bowel Regimen: miralax  CODE: DNR/DNI  Disposition: Accepted at [place] when medically appropriate     Assessment and plan to be discussed with senior  resident and attending physician.   Cari Jaramillo D.O.  PGY-1, Internal Medicine   - [facility]              Cari Jaramillo DO

## 2024-03-20 NOTE — NURSING NOTE
Mr. Boyer c/o SOB. He is also tachypneic. He's breathing 35-40 bpm and they are shallow. His lungs are clear but diminished & he has a moist non-productive cough. He stated coughing exacerbates the SOB and he stated anxiety is playing a role as well. HOB elevated in high Lacy's position and he is maintained on 2 liters via nasal canula. Secured message sent to Nicole Grullon & Sudhakar Gottlieb.    Kimberley Dubois RN

## 2024-03-20 NOTE — NURSING NOTE
Dr. Grullon at bedside assessing Mr. Boyer. Respirations 32 BPM. Oxygen sat 98% however Mr. Boyer denies SOB.     Kimberley Dubois RN

## 2024-03-20 NOTE — PROGRESS NOTES
03/20/24 0931   Discharge Planning   Patient expects to be discharged to: [facility]   Does the patient need discharge transport arranged? Yes   RoundTrip coordination needed? Yes   Has discharge transport been arranged? No   What day is the transport expected? 03/20/24     Accepted to [facility], voice mail left for son. Message sent to attending .

## 2024-03-21 NOTE — CARE PLAN
The patient's goals for the shift include restful night.    The clinical goals for the shift include adequate oxygenation and pain control.    Over the shift, the patient did not make progress toward the following goals. Barriers to progression include n/a. Recommendations to address these barriers include n/a.

## 2024-03-21 NOTE — PROGRESS NOTES
03/21/24 1004   Discharge Planning   Patient expects to be discharged to: [facility]   Does the patient need discharge transport arranged? Yes   RoundTrip coordination needed? Yes   Has discharge transport been arranged? No     Updates electronically sent to [facility]     1015: Spoke to patient and son at bedside with Dr Hernández present. Son would like patient to be up in chair prior to discharge to [facility]. Message sent to PT/OT. Will keep Dr Hernández updated with therapy notes.    1600: Message for dsc to submit for the 7000 to [facility]. Round trip stretcher transport arranged for 1900 due to inability to ambulate or put weight on the right leg, heavy 2 person assist. Facility notified of transport time, nurse notified, and daughter notified at the bedside. She confirmed she will notify her brother. If the time is too late (8:00 pm) staff instructed to cancel transport and arrange for the am. Kodi solano, AVS, and MAR sent electronically through care port.    none

## 2024-03-21 NOTE — PROGRESS NOTES
Occupational Therapy    Occupational Therapy    Evaluation    Patient Name: Amari Boyer  MRN: [id number]  Today's Date: 3/21/2024  Time Calculation  Start Time: 1041  Stop Time: 1106  Time Calculation (min): 25 min  Rm: 3124    Assessment  IP OT Assessment  OT Assessment: Pt pleasant and cooperative. Pt requies increased asssit with ADL's and mobility. Recommend SNF to increase safety and independence with ADL's.  Pt had plans to discharge to SNF this am, but son persistent about having therapy complete assessment prior to leaving hospital. Pt's son pushing for more mobility. Pt cooperative, yet requires increased motivation to work with therapy. Pt appears irritated with his son and his sons push for increased mobility.    Prognosis: Fair  End of Session Communication: Bedside nurse  End of Session Patient Position: Bed, 3 rail up, Alarm on    Plan:  Treatment Interventions: ADL retraining, UE strengthening/ROM, Neuromuscular reeducation, Cognitive reorientation, Functional transfer training  OT Frequency: 3 times per week  OT Discharge Recommendations: Moderate intensity level of continued care (SNF)  Equipment Recommended upon Discharge: Wheeled walker  OT Recommended Transfer Status: Maximum assist, Assist of 2  OT - OK to Discharge: Yes (to next level of care)    Subjective     Current Problem:  1. Acute pain of right knee        2. Pneumonia of both lower lobes due to infectious organism        3. Dyspnea on exertion  CANCELED: Transthoracic Echo (TTE) Complete    CANCELED: Transthoracic Echo (TTE) Complete      4. Diastolic heart failure, unspecified HF chronicity (CMS/HCC)  CANCELED: Transthoracic Echo (TTE) Complete    CANCELED: Transthoracic Echo (TTE) Complete      5. Heart failure, diastolic, due to CAD, unspecified failure chronicity (CMS/HCC)  CANCELED: Transthoracic Echo (TTE) Complete    CANCELED: Transthoracic Echo (TTE) Complete      6. Weakness  Renal function panel      7. SHADIA (acute kidney  injury) (CMS/[facility])  Renal function panel      8. Edema, unspecified type  furosemide (Lasix) 20 mg tablet          General:  General  Reason for Referral: ADL's; Safety Assessment  Referred By: Ling Hernández  Past Medical History Relevant to Rehab: CVA with L sided weakness (2021), A fib, CHF, CKD, CAD s/p CABG, pacemaker, HF, COPD, DM, lymphedema  Family/Caregiver Present: Yes  Caregiver Feedback: son at bedside  Prior to Session Communication: Bedside nurse  Patient Position Received: Bed, 3 rail up, Alarm on    General Visit Information:  Per EMR: pt presenting with generalized weakness and right-sided knee pain. Patient accompanied by family to provide additional history.  Per family patient's baseline is that he is normally fairly independent only requiring assistance with getting dressed in the morning and at night, and requires some supervision during showers otherwise requires no other assistance.  He had the flu 10 days ago and was treated with a 5-day course of Tamiflu but reports that he has not been feeling like his generalized weakness has been improving and in fact yesterday he reports that he had excruciating knee pain causing him to be unable to move his knee regardless of weightbearing status, normally he is able to bear weight on that knee. No history of septic joint or knee replacements.  He does have a history of lymphedema after his CVA but reports that normally his right leg is stronger.  Normally ambulates via walker or scooter.  He stopped taking his Lasix 1 month ago was he reported that it was becoming difficult for him to make it to the bathroom in time.  Reports that he wears depends diapers and that they do irritate his skin.  Reports that he is more congested and has green sputum production when he coughs.     Precautions:  Medical Precautions: Fall precautions  Precautions Comment: bed/chair alarm (RLE WBAT)  (+) Flu/droplet precautions     Pain:  Pain Assessment  Pain Assessment:  0-10  Pain Score:  (Pt stated that he had pain in R knee; Pt also yelled out in pain when his son touched pt's calves to don socks on pt. Pt did not rate pain)    Objective     Cognition:  Overall Cognitive Status: Within Functional Limits  Orientation Level: Oriented X4    Home Living/PLOF:  Pt is from [facility] since Sept 2023.  Some history obtained from son at bedside.  Pt gets assist from daughter for showers and aides assist with dressing.  Pt was Mod I with mobility up until~7 days ago when he started not feeling well.  Uses FWW in his apt and motorized scooter to dining room for meals or other prolonged distances.  Pt has lift chair in which he uses lift function.  No O2 needs at baseline.    ADL:  Eating Assistance: Stand by  Grooming Assistance: Stand by  Bathing Assistance: Maximal  UE Dressing Assistance: Moderate  LE Dressing Assistance: Maximal  Toileting Assistance with Device: Maximal    Activity Tolerance:  Endurance: Decreased tolerance for upright activites    Functional Assessments:  Bed Mobility   Supine to sit: Max A x2; pt able to minimally move BLE towards EOB with assist, draw sheet used for transfer  Sit to supine: Max A x2; draw sheet used for transfer  Boost in supine: Dep A x2     Transfers/Ambulation  Sit to stand: Max A of 2 with poor balance and increased posterior lean. Pt required Max A to maintain stand balance.    Sitting Balance:  Static Sitting Balance  Static Sitting-Comment/Number of Minutes: fair  Dynamic Sitting Balance  Dynamic Sitting-Comments: poor (decreased ability to maintain dynamic sit balance)    Standing Balance:  Static Standing Balance  Static Standing-Comment/Number of Minutes: poor  Dynamic Standing Balance  Dynamic Standing-Comments: poor (pt fearful of falling and with significant posterior lean)    Sensation:  Sensation Comment: denies any n/t    Strength:  Strength Comments: B UE's grossly 4/5    Extremities: RUE   RUE : Within Functional Limits  and LUE   LUE:  (shoulder AROM flexion about 90 degrees)    Outcome Measures: [facility] Daily Activity  Putting on and taking off regular lower body clothing: Total  Bathing (including washing, rinsing, drying): Total  Putting on and taking off regular upper body clothing: A lot  Toileting, which includes using toilet, bedpan or urinal: Total  Taking care of personal grooming such as brushing teeth: A lot  Eating Meals: A lot  Daily Activity - Total Score: 9    EDUCATION:  Education  Individual(s) Educated: Patient  Education Provided:  (safety)  Education Documentation  Body Mechanics, taught by Deepika Sevilla OT at 3/21/2024  3:45 PM.  Learner: Patient  Readiness: Acceptance  Method: Explanation  Response: Verbalizes Understanding    Precautions, taught by Deepika Sevilla OT at 3/21/2024  3:45 PM.  Learner: Patient  Readiness: Acceptance  Method: Explanation  Response: Verbalizes Understanding    Education Comments  No comments found.        Goals:   Encounter Problems       Encounter Problems (Active)       OT Goals       OT Goal 1 (Progressing)       Start:  03/21/24    Expected End:  04/04/24       Pt will complete all bed mobility with Min A safely            OT Goal 2 (Progressing)       Start:  03/21/24    Expected End:  04/04/24       Pt with complete all transfers safely with Min A of 1-2           OT Goal 3 (Progressing)       Start:  03/21/24    Expected End:  04/04/24       Pt will complete ADL's and mobility with good sit balance and fair stand balance            OT Goal 4 (Progressing)       Start:  03/21/24    Expected End:  04/04/24       Pt will complete UB dressing ADL's with CGA using adaptive device(s) as needed           OT Goal 5 (Progressing)       Start:  03/21/24    Expected End:  04/04/24       Pt with complete grooming ADL's with supervision after setup               Treatment: Pt required Max A of 2 to complete supine-sit. Pt with fair static sit balance on EOB. With dynamic sit balance, pt  required Mod A to maintain sit balance. Pt with several posterior LOB's while on EOB. Pt completed sit-stand with Max A of 2 to wheeled walker. Pt with increased fear of falling and increased posterior lean. Pt required Max A to maintain stand balance. Pt was not able to side step to HOB. Pt did attempt to scoot hips to HOB while sitting EOB, and required Max A of 2 to draw sheet. Pt returned to supine in bed with Max A of 2 and remained in bed with bed alarm on and call light within reach.

## 2024-03-21 NOTE — PROGRESS NOTES
Physical Therapy    Physical Therapy Treatment    Patient Name: Amari Boyer  MRN: [id number]  Today's Date: 3/21/2024  Time Calculation  Start Time: 1106  Stop Time: 1136  Time Calculation (min): 30 min      03/21/24 1106   PT  Visit   PT Received On 03/21/24   Response to Previous Treatment Patient with no complaints from previous session.   General   Reason for Referral Impaired Mobility, Gait training.   Past Medical History Relevant to Rehab CVA with L sided weakness (2021), A fib, CHF, CKD, CAD s/p CABG, pacemaker, HF, COPD, DM, lymphedema   Family/Caregiver Present Yes   Caregiver Feedback son at bedside   Prior to Session Communication Bedside nurse   Patient Position Received Bed, 3 rail up;Alarm on   General Comment Pt. was in bed when I arrived. OT in for eval. Pt. c/o pain in B LE with touch or pressure.   Precautions   Medical Precautions Fall precautions   Precautions Comment Bed  Chair alarm   Cognition   Overall Cognitive Status WFL   Therapeutic Exercise   Therapeutic Exercise Activity 1 Seated Ex's Heel/Toe raises. LAQ, Hip Abd/add, Seated hip flexion. x 15   Therapeutic Exercise Activity 2 Cycles of breathing x 3 Cycles.  Diaphragmatic breathing x 5 reps  Mouth open wide hand covering mouth (3 huffing exhalations), (ie fogging up window or mirror)  Cough. (3-4 volitional coughs) pt. performed   Regular breaths between Diaphragmatic and huffing breaths for pacing and to prevent hyperventilating.     PT. Needed cues and demonstration of breaths during breathing cycle. Cough was productive  after 2nd cycle.    Bed Mobility 1   Bed Mobility 1 Supine to sitting;Sitting to supine   Level of Assistance 1 Moderate verbal cues  (2)   Bed Mobility Comments 1 Pt transfrer supine to sit with Max assist x2. Used the draw sheet to assist with rolling and transfer to sitting.   Bed Mobility 2   Bed Mobility  2 Rolling right;Rolling left   Level of Assistance 2 Maximum assistance   Bed Mobility Comments 2  Rolling right and left to change linen and clean him up.   Transfer 1   Technique 1 Sit to stand;Stand to sit   Transfer Device 1 Walker   Transfer Level of Assistance 1 Moderate assistance;Maximum assistance;+2   Trials/Comments 1 Verbal cues and assist x2 to stand x2 from the EOB. Pt. stood to tolernace. Posterior left lean. Sitting on the EOB. Scooted to the HOB. wiht mod assist x2.   Activity Tolerance   Endurance Decreased tolerance for upright activites   PT Assessment   End of Session Communication Bedside nurse   End of Session Patient Position Bed, 3 rail up;Alarm on   PT Plan   Equipment Recommended upon Discharge Wheeled walker     Assessment/Plan   PT Assessment  End of Session Communication: Bedside nurse  End of Session Patient Position: Bed, 3 rail up, Alarm on     PT Plan  Treatment/Interventions: Bed mobility, Transfer training, Gait training, Balance training, Neuromuscular re-education, Strengthening, Range of motion, Therapeutic exercise, Therapeutic activity  PT Plan: Skilled PT  PT Frequency: 3 times per week  PT Discharge Recommendations: Moderate intensity level of continued care  Equipment Recommended upon Discharge: Wheeled walker  PT Recommended Transfer Status: Assist x2 (Max A)  PT - OK to Discharge: Yes    Outcome Measures:  [facility] Basic Mobility  Turning from your back to your side while in a flat bed without using bedrails: A lot  Moving from lying on your back to sitting on the side of a flat bed without using bedrails: A lot  Moving to and from bed to chair (including a wheelchair): Total  Standing up from a chair using your arms (e.g. wheelchair or bedside chair): A lot  To walk in hospital room: Total  Climbing 3-5 steps with railing: Total  Basic Mobility - Total Score: 9                             EDUCATION:     Education Documentation  Body Mechanics, taught by Braulio Anand PTA at 3/21/2024  4:49 PM.  Learner: Patient  Readiness: Acceptance  Method: Demonstration,  Explanation  Response: Needs Reinforcement    Home Exercise Program, taught by Braulio Anand PTA at 3/21/2024  4:49 PM.  Learner: Patient  Readiness: Acceptance  Method: Demonstration, Explanation  Response: Needs Reinforcement    Mobility Training, taught by Braulio Anand PTA at 3/21/2024  4:49 PM.  Learner: Patient  Readiness: Acceptance  Method: Demonstration, Explanation  Response: Needs Reinforcement    Education Comments  No comments found.        GOALS:  Encounter Problems       Encounter Problems (Active)       PT Problem       Pt will be able to perform all bed mobility tasks with Min-Mod A.  (Progressing)       Start:  03/19/24    Expected End:  04/02/24            Pt will perform all transfers with Mod A and FWW with proper safety mechanics.   (Progressing)       Start:  03/19/24    Expected End:  04/02/24            Pt will ambulate 30 ft with Mod A using FWW for improved functional independence.  (Progressing)       Start:  03/19/24    Expected End:  04/02/24            Pt will demonstrate good dynamic sit balance for completion of therapeutic exercises and functional tasks.  (Progressing)       Start:  03/19/24    Expected End:  04/02/24

## 2024-03-21 NOTE — CARE PLAN
The patient's goals for the shift include remain fall free    The clinical goals for the shift include pox will remain 92% or greater

## 2024-03-21 NOTE — PROGRESS NOTES
Spiritual Care Visit    Clinical Encounter Type  Visited With: Patient and family together  Routine Visit: Introduction  Continue Visiting: Yes    Gnosticism Encounters  Gnosticism Needs: Literature, Gnosticism articles         Sacramental Encounters  Communion: Patient wants communion  Communion Given Indicator: Yes  Sacrament of Sick-Anointing: Anointed                             Taxonomy  Intended Effects: Establish rapport and connectedness, Skylar affirmation, Build relationship of care and support, Demonstrate caring and concern, Lessen someone's feelings of isolation, Helping someone feel comforted, Promote sense of peace

## 2024-03-21 NOTE — HOSPITAL COURSE
Amari Boyer is a 89 y.o. male with significant medical history of A fib- eliquis, CAD s/p CABG 1993 with LIMA-LAD, VG-OM and VG-RCA, [employer] DDDR pacemakers in 2000, 2005, 2013, and most recently in November 2020, PCI 4/2010 OM with ETHAN, HFpEF, COPD, hypothyroidism, CKD stage 3b, Hx of CVA w/ residual L side weakness, T2DM- insulin independent, lymphedema presenting with generalized weakness and right-sided knee pain.      He was positive for the flu 10 days prior to admission and treated with 5-day course of Tamiflu however still reports generalized weakness.  Chest x-ray demonstrated multilobular pneumonia the right worsen than left.  It was initially suspected the patient had superimposed bacterial pneumonia and was treated with Rocephin and azithromycin for for 2 days.  Chart review and patient report he is supposed to be taking Lasix however stopped taking it due to his inability to make it to the bathroom on time due to his limited mobility.  Lasix was restarted in the hospital and due to his improvement with the diuretic the antibiotics were stopped and his condition was treated as acute exacerbation of congestive heart failure.  Lasix has been restarted and it is recommended to get a renal function panel within 1 week of discharge to reassess continuing Lasix.      Orthopedic surgery was consulted regarding his right knee.  Chest x-ray showed evidence of osteoarthritis.  A joint aspiration was performed on the second day of admission and did not show signs of infection and culture has shown no growth to date.  On the day of discharge orthopedics administered a cortisone shot and they recommend follow-up outpatient within 1 to 2 weeks.     On physical exam the patient had conjunctivitis in the right eye and was treated with 4 days of Polytrim with some improvement, will continue 10 days outpatient for a complete course.  He also reported anxiety and trouble sleeping. On the night before  discharge he was given Atarax and was able to sleep through the night.  This medication has been prescribed for him to use nightly.    Patient is hemodynamically stable and appropriate for discharge to a SNF when transportation can be arranged.    # Congestive heart failure secondary to influenza  -Continue daily Lasix  -RFP in 1 week to assess Lasix continuance    # Right knee pain  -Joint aspiration performed with no signs of infection  -Cortisone shot given 3/21  -Follow-up with orthopedic surgery in 1 to 2 weeks    # Conjunctivitis  -Polytrim    # Anxiety/insomnia  -Prescribed Atarax for nightly use

## 2024-03-21 NOTE — DISCHARGE INSTRUCTIONS
You have been prescribed Lasix every day for the next three days, after the three days your provider at nursing facility will determine your need for continued diuresis.  You have also been prescribed Atarax this medication will help with anxiety and insomnia.  You will complete 10 additional day sof Polytrim drops to complete your course of conjunctivitis (eye irritation/infection). There have been no changes to your home medications and you have completed your course of Tamiflu so you no longer need to take this medication.    It is recommended to have a renal function panel one week after discharge. This is blood work to check your electrolytes and kidney function.     Please call your primary care physician, Dr. Chavez Clark, to schedule a post hospital follow up visit and to discuss the results of your blood work.    While admitted to the hospital you had a joint aspiration and cortisone shot to your right knee.  The orthopedic team would like to see you in 1 to 2 weeks from discharge to follow-up on your care.    Thank you for allowing us to participate in your care.  [facility]

## 2024-03-21 NOTE — DISCHARGE SUMMARY
Discharge Diagnosis  Acute pain of right knee    Issues Requiring Follow-Up  Primary care physician -continuance of Lasix, RFP within 1 week of discharge; Atarax given for anxiety and insomnia; conjunctivitis  Orthopedics - right knee joint aspiration, cortisone shot    Atarax, furosemide, Polytrim drops prescribed at discharge; completed course of Tamiflu    Discharge Meds     Your medication list        CHANGE how you take these medications        Instructions Last Dose Given Next Dose Due   furosemide 20 mg tablet  Commonly known as: Lasix  What changed:   when to take this  reasons to take this      Take 1 tablet (20 mg) by mouth once daily for 3 days.              CONTINUE taking these medications        Instructions Last Dose Given Next Dose Due   apixaban 2.5 mg tablet  Commonly known as: Eliquis           docusate sodium 100 mg capsule  Commonly known as: Colace           isosorbide mononitrate ER 60 mg 24 hr tablet  Commonly known as: Imdur           levothyroxine 75 mcg tablet  Commonly known as: Synthroid, Levoxyl      Take 1 tablet (75 mcg) by mouth once daily.       metoprolol tartrate 50 mg tablet  Commonly known as: Lopressor           nitroglycerin 0.4 mg SL tablet  Commonly known as: Nitrostat      Place 1 tablet (0.4 mg) under the tongue every 5 minutes if needed for chest pain.       PreserVision AREDS-2 250-90-40-1 mg capsule  Generic drug: vit C,N-Yc-qoyfp-lutein-zeaxan           tamsulosin 0.4 mg 24 hr capsule  Commonly known as: Flomax      Take 1 capsule (0.4 mg) by mouth once daily.              STOP taking these medications      oseltamivir 75 mg capsule  Commonly known as: Tamiflu                  Where to Get Your Medications        Information about where to get these medications is not yet available    Ask your nurse or doctor about these medications  furosemide 20 mg tablet         Test Results Pending At Discharge  Pending Labs       No current pending labs.            Hospital  Course  Amari Boyer is a 89 y.o. male with significant medical history of A fib- eliquis, CAD s/p CABG 1993 with LIMA-LAD, VG-OM and VG-RCA, [employer] DDDR pacemakers in 2000, 2005, 2013, and most recently in November 2020, PCI 4/2010 OM with ETHAN, HFpEF, COPD, hypothyroidism, CKD stage 3b, Hx of CVA w/ residual L side weakness, T2DM- insulin independent, lymphedema presenting with generalized weakness and right-sided knee pain.      He was positive for the flu 10 days prior to admission and treated with 5-day course of Tamiflu however still reports generalized weakness.  Chest x-ray demonstrated multilobular pneumonia the right worsen than left.  It was initially suspected the patient had superimposed bacterial pneumonia and was treated with Rocephin and azithromycin for for 2 days.  Chart review and patient report he is supposed to be taking Lasix however stopped taking it due to his inability to make it to the bathroom on time due to his limited mobility.  Lasix was restarted in the hospital and due to his improvement with the diuretic the antibiotics were stopped and his condition was treated as acute exacerbation of congestive heart failure.  Lasix has been restarted and it is recommended to get a renal function panel within 1 week of discharge to reassess continuing Lasix.      Orthopedic surgery was consulted regarding his right knee.  Chest x-ray showed evidence of osteoarthritis.  A joint aspiration was performed on the second day of admission and did not show signs of infection and culture has shown no growth to date.  On the day of discharge orthopedics administered a cortisone shot and they recommend follow-up outpatient within 1 to 2 weeks.     On physical exam the patient had conjunctivitis in the right eye and was treated with 3 days of Polytrim with improvement.  He also reported anxiety and trouble sleeping. On the night before discharge he was given Atarax and was able to sleep  through the night.  This medication has been prescribed for him to use nightly.    Patient is hemodynamically stable and appropriate for discharge to a SNF when transportation can be arranged.    # Congestive heart failure secondary to influenza  -Continue daily Lasix  -RFP in 1 week to assess Lasix continuance    # Right knee pain  -Joint aspiration performed with no signs of infection  -Cortisone shot given 3/21  -Follow-up with orthopedic surgery in 1 to 2 weeks    # Conjunctivitis  -Completed 3-day treatment of antibiotic drops    # Anxiety/insomnia  -Prescribed Atarax for nightly use    Pertinent Physical Exam At Time of Discharge  Physical Exam  Constitutional:       Appearance: He is normal weight.   Cardiovascular:      Rate and Rhythm: Normal rate and regular rhythm.   Pulmonary:      Breath sounds: Wheezing (Improved from prior exam) present.   Abdominal:      General: Abdomen is flat. Bowel sounds are normal.      Palpations: Abdomen is soft.   Musculoskeletal:      Right lower leg: No edema.      Left lower leg: No edema.   Skin:     General: Skin is warm and dry.   Neurological:      General: No focal deficit present.      Mental Status: He is alert. Mental status is at baseline.   Psychiatric:         Mood and Affect: Mood normal.         Behavior: Behavior normal.         Thought Content: Thought content normal.         Judgment: Judgment normal.         Outpatient Follow-Up  Future Appointments   Date Time Provider Department Center   4/10/2024  1:00 PM Chavez Clark DO [id number] [place]   7/30/2024  1:45 PM Nikko Jarrell MD [id number] [place]   10/1/2024  1:20 PM [facility] [id number] [place]   10/1/2024  1:40 PM Amari Lowry MD [id number] [place]         Cari Jaramillo DO

## 2024-03-21 NOTE — PROGRESS NOTES
DAILY PROGRESS NOTE          Patient doing fairly well  Visit Vitals  /74 (BP Location: Left arm, Patient Position: Lying)   Pulse 68   Temp 36.3 °C (97.3 °F) (Temporal)   Resp 16      Temp (24hrs), Av.1 °C (97 °F), Min:35.9 °C (96.6 °F), Max:36.3 °C (97.3 °F)       Pain Control fair  No chest pain or shortness of breath.  No calf pain    Exam:   Right knee has moderate swelling no signs of infection no redness no warmth limited range of motion significant crepitus with range of motion  Extremity shows neuro vascular status intact. Flexion and extension intact on extremity.  Calves soft and non-tender without evidence of DVT.        Labs reviewed:  Final culture results from right knee aspiration negative    I&O  I/O last 3 completed shifts:  In: - (0 mL/kg)   Out: 1400 (18.2 mL/kg) [Urine:1400 (0.5 mL/kg/hr)]  Weight: 77 kg       Assessment: Right knee arthritis    Plan: Right knee cortisone injection performed today without complication 2 cc Kenalog 4 cc lidocaine patient Toller procedure well.    Before aspiration/injection, the risks  of this procedure including but not limited to;  infection, local skin irritation, skin atrophy, calcification, continued pain or discomfort, elevated blood sugar, burning, failure to relieve pain, possible late infection were all discussed with the patient.  The patient verbalized understanding and consented to the procedure.   After informed consent was provided, patient identification was confirmed, and allergies were verified, the patient was appropriately positioned. The site was marked and time-out performed.  The injection site was prepped in the usual sterile manner to provide a sterile environment. The skin was anesthetized with ethyl chloride spray. The aspiration/injection was performed with standard technique. The needle was withdrawn and the puncture site was secured with a Band-Aid. The patient tolerated the procedure well without complication.    Post-procedure discomfort can be alleviated with additional medication, ice, elevation, and rest over the first 24 hours as recommended.  The injection was right knee 2 cc Celestone 4 cc lidocaine    Plan is to have patient follow-up with Dr. Grubbs as an outpatient after discharge okay to discharge when stable from medical standpoint.      Bruce Boykin MD MD  3/21/2024 11:00 AM

## 2024-03-22 NOTE — CARE PLAN
Problem: Pain  Goal: My pain/discomfort is manageable  Outcome: Progressing   The patient's goals for the shift include      The clinical goals for the shift include pt will remain on RA thru shift    Over the shift, the patient did not make progress toward the following goals.

## 2024-03-25 NOTE — LETTER
Patient: Amari Boyer  : 10/3/1930    Encounter Date: 2024    Subjective  Patient ID: Amari Boyer is a 89 y.o. male.    Patient is a 89-year-old male with past medical history of CAD, CABG, PCI, A-fib on anticoagulation, HFpEF, COPD, CKD, recent influenza infection who presented to the ED with worsening weakness and dyspnea on exertion.  Patient was found to have bilateral lower extremity edema as well as pleural effusions and elevated BNP.  Due to this, patient was admitted for heart failure exacerbation.  And generalized weakness.  Patient also did have a swollen knee joint, which was concerning for infection.  Arthrocentesis was done which was negative.  Patient improved with supportive care with diuresis, and was discharged to skilled nursing facility in stable condition.  On evaluation, patient overall feels well however still is fairly weak.  He does endorse at times that he also has some difficulty swallowing as well.  States that this has been going on since he was in the hospital.  Regards that this is likely secondary to his recent weakness as well.  Otherwise feels overall well is tolerating his medications.  Denies any additional issues at this time.        Review of Systems   Constitutional: Negative.    HENT: Negative.     Respiratory: Negative.     Cardiovascular: Negative.    Gastrointestinal: Negative.    Musculoskeletal: Negative.    Skin: Negative.    Neurological: Negative.    Psychiatric/Behavioral: Negative.         ObjectiveVitals Reviewed via facility EMR   Physical Exam  Constitutional:       General: He is not in acute distress.     Appearance: He is not ill-appearing.      Comments: Elderly male   Eyes:      Pupils: Pupils are equal, round, and reactive to light.   Cardiovascular:      Rate and Rhythm: Normal rate and regular rhythm.      Pulses: Normal pulses.      Heart sounds: No murmur heard.  Pulmonary:      Effort: No respiratory distress.      Breath sounds: No  wheezing.      Comments: Reported dyspahgia  Abdominal:      General: Abdomen is flat. Bowel sounds are normal. There is no distension.   Musculoskeletal:      Right lower leg: No edema.      Left lower leg: No edema.   Skin:     General: Skin is warm and dry.   Neurological:      Mental Status: He is alert. Mental status is at baseline.      Cranial Nerves: No cranial nerve deficit.      Motor: No weakness.   Psychiatric:         Mood and Affect: Mood normal.         Behavior: Behavior normal.         Assessment/Plan  Diagnoses and all orders for this visit:  S/P CABG x 3  Heart failure, unspecified HF chronicity, unspecified heart failure type (CMS/HCC)  Chronic diastolic heart failure (CMS/HCC)  Type 2 diabetes mellitus with chronic kidney disease, without long-term current use of insulin, unspecified CKD stage (CMS/HCC)  Chronic obstructive pulmonary disease, unspecified COPD type (CMS/[facility])  Weakness  Unsteady gait  Other dysphagia      Patient seen and examined at bedside.  Overall medically stable, hospitalization in hospital course reviewed extensively.  Medications reviewed and reconciled.  He does endorse some difficulty with swallowing this morning, would recommend a speech therapy evaluation to further evaluate.  Discussed this with therapist who will evaluate the patient.  Will closely monitor patient's fluid status as he was recently admitted with a heart failure exacerbation.  Encouraged low-salt diet as well as fluid restriction.  Continue PT OT.  Will obtain routine labs optimize electrolytes continue supportive care.  Patient agreeable to plan, staff updated at bedside.    Reviewed and approved by SONG DURAN on 3/27/24 at 9:53 PM.       Electronically Signed By: Song Duran DO   3/27/24  9:53 PM

## 2024-03-27 PROBLEM — R13.19 OTHER DYSPHAGIA: Status: ACTIVE | Noted: 2024-01-01

## 2024-03-27 NOTE — LETTER
Patient: Amari Boyer  : 10/3/1930    Encounter Date: 2024    Subjective  Patient ID: Amari Boyer is a 89 y.o. male.    Patient seen and examined at bedside.  He regards that he is doing slightly better than before.  Is having some worsening diarrhea over the past couple days ago.  States that this was new onset and just came on by itself.  He regards no other constitutional symptoms secondary to this.  Otherwise, states that he has a modified barium swallow upcoming that was recommended by speech.  Regards no additional issues or concerns at this time.  States overall doing well.        Review of Systems   Constitutional: Negative.    HENT: Negative.     Respiratory: Negative.     Cardiovascular: Negative.    Gastrointestinal:  Positive for abdominal pain and diarrhea.   Musculoskeletal: Negative.    Skin: Negative.    Neurological: Negative.    Psychiatric/Behavioral: Negative.         ObjectiveVitals Reviewed via facility EMR   Physical Exam  Constitutional:       General: He is not in acute distress.     Appearance: He is not ill-appearing.   Eyes:      Pupils: Pupils are equal, round, and reactive to light.   Cardiovascular:      Rate and Rhythm: Normal rate and regular rhythm.      Pulses: Normal pulses.      Heart sounds: No murmur heard.  Pulmonary:      Effort: No respiratory distress.      Breath sounds: No wheezing.   Abdominal:      General: Abdomen is flat. Bowel sounds are normal. There is no distension.   Musculoskeletal:      Right lower leg: No edema.      Left lower leg: No edema.   Skin:     General: Skin is warm and dry.   Neurological:      Mental Status: He is alert. Mental status is at baseline.      Cranial Nerves: No cranial nerve deficit.      Motor: No weakness.   Psychiatric:         Mood and Affect: Mood normal.         Behavior: Behavior normal.         Assessment/Plan  Diagnoses and all orders for this visit:  Paroxysmal atrial fibrillation (CMS/[facility])  Heart failure,  unspecified HF chronicity, unspecified heart failure type (CMS/[facility])  Type 2 diabetes mellitus with chronic kidney disease, without long-term current use of insulin, unspecified CKD stage (CMS/[facility])  Cerebrovascular accident (CVA) due to occlusion of cerebral artery (CMS/[facility])  Other dysphagia  Functional diarrhea    Patient seen and examined.  Overall medically stable.  Continue supportive care.  Continue with PT OT, appreciate speech-language pathology recommendations.  We will obtain his C. difficile PCR due to underlying diarrhea that patient is experiencing.  Monitor weekly labs.  Continue supportive care.    Reviewed and approved by SONG DURAN on 3/28/24 at 7:57 PM.       Electronically Signed By: Song Duran DO   3/28/24  7:57 PM

## 2024-03-28 PROBLEM — K59.1 FUNCTIONAL DIARRHEA: Status: ACTIVE | Noted: 2024-01-01

## 2024-03-28 NOTE — PROGRESS NOTES
Subjective   Patient ID: Amari Boyer is a 89 y.o. male.    Patient seen and examined at bedside.  He regards that he is doing slightly better than before.  Is having some worsening diarrhea over the past couple days ago.  States that this was new onset and just came on by itself.  He regards no other constitutional symptoms secondary to this.  Otherwise, states that he has a modified barium swallow upcoming that was recommended by speech.  Regards no additional issues or concerns at this time.  States overall doing well.        Review of Systems   Constitutional: Negative.    HENT: Negative.     Respiratory: Negative.     Cardiovascular: Negative.    Gastrointestinal:  Positive for abdominal pain and diarrhea.   Musculoskeletal: Negative.    Skin: Negative.    Neurological: Negative.    Psychiatric/Behavioral: Negative.         Objective Vitals Reviewed via facility EMR   Physical Exam  Constitutional:       General: He is not in acute distress.     Appearance: He is not ill-appearing.   Eyes:      Pupils: Pupils are equal, round, and reactive to light.   Cardiovascular:      Rate and Rhythm: Normal rate and regular rhythm.      Pulses: Normal pulses.      Heart sounds: No murmur heard.  Pulmonary:      Effort: No respiratory distress.      Breath sounds: No wheezing.   Abdominal:      General: Abdomen is flat. Bowel sounds are normal. There is no distension.   Musculoskeletal:      Right lower leg: No edema.      Left lower leg: No edema.   Skin:     General: Skin is warm and dry.   Neurological:      Mental Status: He is alert. Mental status is at baseline.      Cranial Nerves: No cranial nerve deficit.      Motor: No weakness.   Psychiatric:         Mood and Affect: Mood normal.         Behavior: Behavior normal.         Assessment/Plan   Diagnoses and all orders for this visit:  Paroxysmal atrial fibrillation (CMS/HCC)  Heart failure, unspecified HF chronicity, unspecified heart failure type  (CMS/[facility])  Type 2 diabetes mellitus with chronic kidney disease, without long-term current use of insulin, unspecified CKD stage (CMS/[facility])  Cerebrovascular accident (CVA) due to occlusion of cerebral artery (CMS/[facility])  Other dysphagia  Functional diarrhea    Patient seen and examined.  Overall medically stable.  Continue supportive care.  Continue with PT OT, appreciate speech-language pathology recommendations.  We will obtain his C. difficile PCR due to underlying diarrhea that patient is experiencing.  Monitor weekly labs.  Continue supportive care.    Reviewed and approved by TERRI DURAN on 3/28/24 at 7:57 PM.

## 2024-03-28 NOTE — PROGRESS NOTES
Subjective   Patient ID: Amari Boyer is a 89 y.o. male.    Patient is a 89-year-old male with past medical history of CAD, CABG, PCI, A-fib on anticoagulation, HFpEF, COPD, CKD, recent influenza infection who presented to the ED with worsening weakness and dyspnea on exertion.  Patient was found to have bilateral lower extremity edema as well as pleural effusions and elevated BNP.  Due to this, patient was admitted for heart failure exacerbation.  And generalized weakness.  Patient also did have a swollen knee joint, which was concerning for infection.  Arthrocentesis was done which was negative.  Patient improved with supportive care with diuresis, and was discharged to skilled nursing facility in stable condition.  On evaluation, patient overall feels well however still is fairly weak.  He does endorse at times that he also has some difficulty swallowing as well.  States that this has been going on since he was in the hospital.  Regards that this is likely secondary to his recent weakness as well.  Otherwise feels overall well is tolerating his medications.  Denies any additional issues at this time.        Review of Systems   Constitutional: Negative.    HENT: Negative.     Respiratory: Negative.     Cardiovascular: Negative.    Gastrointestinal: Negative.    Musculoskeletal: Negative.    Skin: Negative.    Neurological: Negative.    Psychiatric/Behavioral: Negative.         Objective Vitals Reviewed via facility EMR   Physical Exam  Constitutional:       General: He is not in acute distress.     Appearance: He is not ill-appearing.      Comments: Elderly male   Eyes:      Pupils: Pupils are equal, round, and reactive to light.   Cardiovascular:      Rate and Rhythm: Normal rate and regular rhythm.      Pulses: Normal pulses.      Heart sounds: No murmur heard.  Pulmonary:      Effort: No respiratory distress.      Breath sounds: No wheezing.      Comments: Reported dyspahgia  Abdominal:      General:  Abdomen is flat. Bowel sounds are normal. There is no distension.   Musculoskeletal:      Right lower leg: No edema.      Left lower leg: No edema.   Skin:     General: Skin is warm and dry.   Neurological:      Mental Status: He is alert. Mental status is at baseline.      Cranial Nerves: No cranial nerve deficit.      Motor: No weakness.   Psychiatric:         Mood and Affect: Mood normal.         Behavior: Behavior normal.         Assessment/Plan   Diagnoses and all orders for this visit:  S/P CABG x 3  Heart failure, unspecified HF chronicity, unspecified heart failure type (CMS/HCC)  Chronic diastolic heart failure (CMS/HCC)  Type 2 diabetes mellitus with chronic kidney disease, without long-term current use of insulin, unspecified CKD stage (CMS/HCC)  Chronic obstructive pulmonary disease, unspecified COPD type (CMS/HCC)  Weakness  Unsteady gait  Other dysphagia      Patient seen and examined at bedside.  Overall medically stable, hospitalization in hospital course reviewed extensively.  Medications reviewed and reconciled.  He does endorse some difficulty with swallowing this morning, would recommend a speech therapy evaluation to further evaluate.  Discussed this with therapist who will evaluate the patient.  Will closely monitor patient's fluid status as he was recently admitted with a heart failure exacerbation.  Encouraged low-salt diet as well as fluid restriction.  Continue PT OT.  Will obtain routine labs optimize electrolytes continue supportive care.  Patient agreeable to plan, staff updated at bedside.    Reviewed and approved by TERRI DURAN on 3/27/24 at 9:53 PM.

## 2024-04-03 NOTE — LETTER
Patient: Amari Boyer  : 10/3/1930    Encounter Date: 2024    Subjective  Patient ID: Amari Boyer is a 89 y.o. male.    Patient seen and evaluated at bedside.  He voices no acute complaints or concerns and overall feels well.  Feels as if he is gaining strength back with physical therapy.  Breathing is overall stable, regards no constitutional symptoms.  States no additional issues at this time.        Review of Systems   Constitutional: Negative.    HENT: Negative.     Respiratory: Negative.     Cardiovascular: Negative.    Gastrointestinal: Negative.    Musculoskeletal: Negative.    Skin: Negative.    Neurological: Negative.    Psychiatric/Behavioral: Negative.         ObjectiveVitals Reviewed via facility EMR   Physical Exam  Constitutional:       General: He is not in acute distress.     Appearance: He is not ill-appearing.      Comments: Eldelry male, resting in bed   Eyes:      Pupils: Pupils are equal, round, and reactive to light.   Cardiovascular:      Rate and Rhythm: Normal rate and regular rhythm.      Pulses: Normal pulses.      Heart sounds: No murmur heard.  Pulmonary:      Effort: No respiratory distress.      Breath sounds: No wheezing.   Abdominal:      General: Abdomen is flat. Bowel sounds are normal. There is no distension.   Musculoskeletal:      Right lower leg: No edema.      Left lower leg: No edema.   Skin:     General: Skin is warm and dry.   Neurological:      Mental Status: He is alert. Mental status is at baseline.      Cranial Nerves: No cranial nerve deficit.      Motor: No weakness.   Psychiatric:         Mood and Affect: Mood normal.         Behavior: Behavior normal.         Assessment/Plan  Diagnoses and all orders for this visit:  Coronary artery disease of native artery of native heart with stable angina pectoris (CMS/HCC)  Essential hypertension  Type 2 diabetes mellitus with chronic kidney disease, without long-term current use of insulin, unspecified CKD  stage (CMS/HCC)  Exudative age-related macular degeneration, right eye, with active choroidal neovascularization (CMS/HCC)  TIA (transient ischemic attack)  Weakness    Patient seen and examined at bedside.  Overall medically stable.  Continue supportive care.  Vitals reviewed and labs reviewed and overall no issues noted.  Creatinine has been slowly downtrending.  Continue with PT OT.  Fall precautions.  No change in medications at this time.    Reviewed and approved by SONG DURAN on 4/4/24 at 10:52 PM.       Electronically Signed By: Song Duran DO   4/4/24 10:52 PM

## 2024-04-05 NOTE — LETTER
Patient: Amari Boyer  : 10/3/1930    Encounter Date: 2024    Subjective  Patient ID: Amari Boyer is a 89 y.o. male.    Patient seen and examined at bedside.  He regards that he is up in his wheelchair this morning and eating his breakfast.  Has slowly been improving with physical therapy, states that he still feels weak is surprised how weak he did become from his recent hospitalization.  It is somewhat stressed about this, but overall is improving.  Otherwise, regards no issues with his medications and overall feels well.  Denies any additional issues.        Review of Systems   Constitutional: Negative.    HENT: Negative.     Respiratory: Negative.     Cardiovascular: Negative.    Gastrointestinal: Negative.    Musculoskeletal: Negative.    Skin: Negative.    Neurological: Negative.    Psychiatric/Behavioral: Negative.         ObjectiveVitals Reviewed via facility EMR   Physical Exam  Constitutional:       General: He is not in acute distress.     Appearance: He is not ill-appearing.      Comments: Elderly male, in wheelchair, pleasant   Eyes:      Pupils: Pupils are equal, round, and reactive to light.   Cardiovascular:      Rate and Rhythm: Normal rate and regular rhythm.      Pulses: Normal pulses.      Heart sounds: No murmur heard.  Pulmonary:      Effort: No respiratory distress.      Breath sounds: No wheezing.   Abdominal:      General: Abdomen is flat. Bowel sounds are normal. There is no distension.   Musculoskeletal:      Right lower leg: No edema.      Left lower leg: No edema.   Skin:     General: Skin is warm and dry.   Neurological:      Mental Status: He is alert. Mental status is at baseline.      Cranial Nerves: No cranial nerve deficit.      Motor: No weakness.   Psychiatric:         Mood and Affect: Mood normal.         Behavior: Behavior normal.         Assessment/Plan  Diagnoses and all orders for this visit:  Paroxysmal atrial fibrillation (CMS/[facility])  Essential  hypertension  Coronary artery disease of native artery of native heart with stable angina pectoris (CMS/HCC)  Type 2 diabetes mellitus with chronic kidney disease, without long-term current use of insulin, unspecified CKD stage (CMS/HCC)  Cerebrovascular accident (CVA) due to occlusion of cerebral artery (CMS/HCC)  Weakness  Patient seen and examined.  Overall medically stable.  Continue with PT OT.  Discussed with patient that his therapy course likely will be long as he was significantly weak when discharged from the hospital.  He voiced understanding with this.  Overall is slowly improving with episodes of physical therapy.  Continue supportive care.  Routine labs.  No changes in medications at this time.    Reviewed and approved by SONG DURAN on 4/5/24 at 9:58 PM.         Electronically Signed By: Song Duran DO   4/5/24  9:58 PM

## 2024-04-05 NOTE — PROGRESS NOTES
Subjective   Patient ID: Amari Boyer is a 89 y.o. male.    Patient seen and evaluated at bedside.  He voices no acute complaints or concerns and overall feels well.  Feels as if he is gaining strength back with physical therapy.  Breathing is overall stable, regards no constitutional symptoms.  States no additional issues at this time.        Review of Systems   Constitutional: Negative.    HENT: Negative.     Respiratory: Negative.     Cardiovascular: Negative.    Gastrointestinal: Negative.    Musculoskeletal: Negative.    Skin: Negative.    Neurological: Negative.    Psychiatric/Behavioral: Negative.         Objective Vitals Reviewed via facility EMR   Physical Exam  Constitutional:       General: He is not in acute distress.     Appearance: He is not ill-appearing.      Comments: Eldelry male, resting in bed   Eyes:      Pupils: Pupils are equal, round, and reactive to light.   Cardiovascular:      Rate and Rhythm: Normal rate and regular rhythm.      Pulses: Normal pulses.      Heart sounds: No murmur heard.  Pulmonary:      Effort: No respiratory distress.      Breath sounds: No wheezing.   Abdominal:      General: Abdomen is flat. Bowel sounds are normal. There is no distension.   Musculoskeletal:      Right lower leg: No edema.      Left lower leg: No edema.   Skin:     General: Skin is warm and dry.   Neurological:      Mental Status: He is alert. Mental status is at baseline.      Cranial Nerves: No cranial nerve deficit.      Motor: No weakness.   Psychiatric:         Mood and Affect: Mood normal.         Behavior: Behavior normal.         Assessment/Plan   Diagnoses and all orders for this visit:  Coronary artery disease of native artery of native heart with stable angina pectoris (CMS/[facility])  Essential hypertension  Type 2 diabetes mellitus with chronic kidney disease, without long-term current use of insulin, unspecified CKD stage (CMS/[facility])  Exudative age-related macular degeneration, right eye,  with active choroidal neovascularization (CMS/HCC)  TIA (transient ischemic attack)  Weakness    Patient seen and examined at bedside.  Overall medically stable.  Continue supportive care.  Vitals reviewed and labs reviewed and overall no issues noted.  Creatinine has been slowly downtrending.  Continue with PT OT.  Fall precautions.  No change in medications at this time.    Reviewed and approved by TERRI DURAN on 4/4/24 at 10:52 PM.

## 2024-04-06 NOTE — PROGRESS NOTES
Subjective   Patient ID: Amari Boyer is a 89 y.o. male.    Patient seen and examined at bedside.  He regards that he is up in his wheelchair this morning and eating his breakfast.  Has slowly been improving with physical therapy, states that he still feels weak is surprised how weak he did become from his recent hospitalization.  It is somewhat stressed about this, but overall is improving.  Otherwise, regards no issues with his medications and overall feels well.  Denies any additional issues.        Review of Systems   Constitutional: Negative.    HENT: Negative.     Respiratory: Negative.     Cardiovascular: Negative.    Gastrointestinal: Negative.    Musculoskeletal: Negative.    Skin: Negative.    Neurological: Negative.    Psychiatric/Behavioral: Negative.         Objective Vitals Reviewed via facility EMR   Physical Exam  Constitutional:       General: He is not in acute distress.     Appearance: He is not ill-appearing.      Comments: Elderly male, in wheelchair, pleasant   Eyes:      Pupils: Pupils are equal, round, and reactive to light.   Cardiovascular:      Rate and Rhythm: Normal rate and regular rhythm.      Pulses: Normal pulses.      Heart sounds: No murmur heard.  Pulmonary:      Effort: No respiratory distress.      Breath sounds: No wheezing.   Abdominal:      General: Abdomen is flat. Bowel sounds are normal. There is no distension.   Musculoskeletal:      Right lower leg: No edema.      Left lower leg: No edema.   Skin:     General: Skin is warm and dry.   Neurological:      Mental Status: He is alert. Mental status is at baseline.      Cranial Nerves: No cranial nerve deficit.      Motor: No weakness.   Psychiatric:         Mood and Affect: Mood normal.         Behavior: Behavior normal.         Assessment/Plan   Diagnoses and all orders for this visit:  Paroxysmal atrial fibrillation (CMS/HCC)  Essential hypertension  Coronary artery disease of native artery of native heart with stable  angina pectoris (CMS/HCC)  Type 2 diabetes mellitus with chronic kidney disease, without long-term current use of insulin, unspecified CKD stage (CMS/HCC)  Cerebrovascular accident (CVA) due to occlusion of cerebral artery (CMS/HCC)  Weakness  Patient seen and examined.  Overall medically stable.  Continue with PT OT.  Discussed with patient that his therapy course likely will be long as he was significantly weak when discharged from the hospital.  He voiced understanding with this.  Overall is slowly improving with episodes of physical therapy.  Continue supportive care.  Routine labs.  No changes in medications at this time.    Reviewed and approved by TERRI DURAN on 4/5/24 at 9:58 PM.

## 2024-04-10 PROBLEM — R19.7 DIARRHEA OF PRESUMED INFECTIOUS ORIGIN: Status: ACTIVE | Noted: 2024-01-01

## 2024-04-10 PROBLEM — E87.8 HYPERCHLOREMIA: Status: ACTIVE | Noted: 2024-01-01

## 2024-04-10 NOTE — LETTER
Patient: Amari Boyer  : 10/3/1930    Encounter Date: 04/10/2024    Subjective  Patient ID: Amari Boyer is a 89 y.o. male.    Patient seen and examined at bedside. He is doing well, however is having some abdominal pain as of late.  Regards that he is having diffuse amounts of diarrhea as well.  This recently started over the past couple days.  Otherwise, he reports that he is working well with therapy and getting extra strength.  Denies any additional issues or concerns at this time.        Review of Systems   Constitutional:  Positive for fatigue.   HENT: Negative.     Respiratory: Negative.     Cardiovascular: Negative.    Gastrointestinal:  Positive for abdominal pain, diarrhea and nausea.   Musculoskeletal: Negative.    Skin: Negative.    Neurological:  Positive for weakness.   Psychiatric/Behavioral: Negative.         ObjectiveVitals Reviewed via facility EMR   Physical Exam  Constitutional:       General: He is not in acute distress.     Appearance: He is not ill-appearing.      Comments: Elderly male   HENT:      Head: Right periorbital erythema: mouth noted to be dry.   Eyes:      Pupils: Pupils are equal, round, and reactive to light.   Cardiovascular:      Rate and Rhythm: Normal rate and regular rhythm.      Pulses: Normal pulses.      Heart sounds: No murmur heard.  Pulmonary:      Effort: No respiratory distress.      Breath sounds: No wheezing.   Abdominal:      General: Abdomen is flat. Bowel sounds are normal. There is no distension.   Musculoskeletal:      Right lower leg: No edema.      Left lower leg: No edema.   Skin:     General: Skin is warm and dry.   Neurological:      Mental Status: He is alert. Mental status is at baseline.      Cranial Nerves: No cranial nerve deficit.      Motor: No weakness.   Psychiatric:         Mood and Affect: Mood normal.         Behavior: Behavior normal.         Assessment/Plan  Diagnoses and all orders for this visit:  Paroxysmal atrial  fibrillation (CMS/HCC)  Heart failure, unspecified HF chronicity, unspecified heart failure type (CMS/HCC)  Essential hypertension  Stage 3b chronic kidney disease (CMS/HCC)  Diarrhea of presumed infectious origin  Weakness    Patient seen and examined at bedside.  Overall clinically stable.  Vitals reviewed and overall stable.  Reviewed labs showed mild elevation in his chloride.  This could be secondary to underlying diarrhea.  Encourage fluid intake, due to diarrhea, obtain C. difficile panel as well as stool PCR.  Continue as needed medications.  PT OT.  Continue supportive care.    Reviewed and approved by SONG DURAN on 4/10/24 at 11:14 PM.       Electronically Signed By: Song Duran DO   4/10/24 11:14 PM

## 2024-04-12 PROBLEM — I69.354 HEMIPLEGIA AND HEMIPARESIS FOLLOWING CEREBRAL INFARCTION AFFECTING LEFT NON-DOMINANT SIDE (MULTI): Status: RESOLVED | Noted: 2023-01-01 | Resolved: 2024-01-01

## 2024-04-12 PROBLEM — N18.4 CKD (CHRONIC KIDNEY DISEASE) STAGE 4, GFR 15-29 ML/MIN (MULTI): Status: ACTIVE | Noted: 2024-01-01

## 2024-04-12 PROBLEM — K13.70 MOUTH LESION: Status: ACTIVE | Noted: 2024-01-01

## 2024-04-12 PROBLEM — N25.81 SECONDARY HYPERPARATHYROIDISM OF RENAL ORIGIN (MULTI): Status: RESOLVED | Noted: 2023-01-01 | Resolved: 2024-01-01

## 2024-04-12 PROBLEM — G63 POLYNEUROPATHY ASSOCIATED WITH UNDERLYING DISEASE (MULTI): Status: RESOLVED | Noted: 2023-05-08 | Resolved: 2024-01-01

## 2024-04-12 NOTE — LETTER
Patient: Amari Boyer  : 10/3/1930    Encounter Date: 2024    Subjective  Patient ID: Amari Boyer is a 89 y.o. male.    Patient seen and examined.  He regards that he is overall doing well, off evaluation, his diarrhea has subsequently resolved.  He reports that he is overall doing well, however his son who is POA noted that patient is due for her coagulation studies.  In addition, patient endorses that he has been having some issues in his mouth that was evaluated by his PCP.  This has been ongoing.  States that it seems to be a small mass in the area otherwise, states no additional issues at this time and overall feels well.        Review of Systems   Constitutional: Negative.    HENT: Negative.          Mouth lesion   Respiratory: Negative.     Cardiovascular: Negative.    Gastrointestinal: Negative.    Musculoskeletal: Negative.    Skin: Negative.    Neurological: Negative.    Psychiatric/Behavioral: Negative.         ObjectiveVitals Reviewed via facility EMR   Physical Exam  Constitutional:       General: He is not in acute distress.     Appearance: He is not ill-appearing.   HENT:      Head:      Comments: Slight growth in mouth cavity  Eyes:      Pupils: Pupils are equal, round, and reactive to light.   Cardiovascular:      Rate and Rhythm: Normal rate and regular rhythm.      Pulses: Normal pulses.      Heart sounds: No murmur heard.  Pulmonary:      Effort: No respiratory distress.      Breath sounds: No wheezing.   Abdominal:      General: Abdomen is flat. Bowel sounds are normal. There is no distension.   Musculoskeletal:      Right lower leg: No edema.      Left lower leg: No edema.   Skin:     General: Skin is warm and dry.   Neurological:      Mental Status: He is alert. Mental status is at baseline.      Cranial Nerves: No cranial nerve deficit.      Motor: No weakness.   Psychiatric:         Mood and Affect: Mood normal.         Behavior: Behavior normal.          Assessment/Plan  Diagnoses and all orders for this visit:  CKD (chronic kidney disease) stage 4, GFR 15-29 ml/min (Multi)  Type 2 diabetes mellitus with chronic kidney disease, without long-term current use of insulin, unspecified CKD stage (Multi)  Unsteady gait  Weakness  Mouth lesion  Diarrhea of presumed infectious origin  Patient seen and examined at bedside.  Overall doing well, diarrhea has subsequently resolved DC isolation precautions as I do not suspect C. difficile, and this likely was a viral gastroenteritis as per physical exam, does seem to have a growth on the roof of the mouth recommend ENT follow-up.  Will have facility follow-up with son with regards to specific anticoagulation studies that need to be done for the patient.  Otherwise, continue supportive care, PT OT.    Reviewed and approved by SONG DURAN on 4/12/24 at 10:34 PM.         Electronically Signed By: Song Duran DO   4/12/24 10:34 PM

## 2024-04-13 NOTE — PROGRESS NOTES
Subjective   Patient ID: Amari Boyer is a 89 y.o. male.    Patient seen and examined.  He regards that he is overall doing well, off evaluation, his diarrhea has subsequently resolved.  He reports that he is overall doing well, however his son who is POA noted that patient is due for her coagulation studies.  In addition, patient endorses that he has been having some issues in his mouth that was evaluated by his PCP.  This has been ongoing.  States that it seems to be a small mass in the area otherwise, states no additional issues at this time and overall feels well.        Review of Systems   Constitutional: Negative.    HENT: Negative.          Mouth lesion   Respiratory: Negative.     Cardiovascular: Negative.    Gastrointestinal: Negative.    Musculoskeletal: Negative.    Skin: Negative.    Neurological: Negative.    Psychiatric/Behavioral: Negative.         Objective Vitals Reviewed via facility EMR   Physical Exam  Constitutional:       General: He is not in acute distress.     Appearance: He is not ill-appearing.   HENT:      Head:      Comments: Slight growth in mouth cavity  Eyes:      Pupils: Pupils are equal, round, and reactive to light.   Cardiovascular:      Rate and Rhythm: Normal rate and regular rhythm.      Pulses: Normal pulses.      Heart sounds: No murmur heard.  Pulmonary:      Effort: No respiratory distress.      Breath sounds: No wheezing.   Abdominal:      General: Abdomen is flat. Bowel sounds are normal. There is no distension.   Musculoskeletal:      Right lower leg: No edema.      Left lower leg: No edema.   Skin:     General: Skin is warm and dry.   Neurological:      Mental Status: He is alert. Mental status is at baseline.      Cranial Nerves: No cranial nerve deficit.      Motor: No weakness.   Psychiatric:         Mood and Affect: Mood normal.         Behavior: Behavior normal.         Assessment/Plan   Diagnoses and all orders for this visit:  CKD (chronic kidney disease)  stage 4, GFR 15-29 ml/min (Multi)  Type 2 diabetes mellitus with chronic kidney disease, without long-term current use of insulin, unspecified CKD stage (Multi)  Unsteady gait  Weakness  Mouth lesion  Diarrhea of presumed infectious origin  Patient seen and examined at bedside.  Overall doing well, diarrhea has subsequently resolved DC isolation precautions as I do not suspect C. difficile, and this likely was a viral gastroenteritis as per physical exam, does seem to have a growth on the roof of the mouth recommend ENT follow-up.  Will have facility follow-up with son with regards to specific anticoagulation studies that need to be done for the patient.  Otherwise, continue supportive care, PT OT.    Reviewed and approved by TERRI DUARN on 4/12/24 at 10:34 PM.

## 2024-04-17 NOTE — LETTER
Patient: Amari Boyer  : 10/3/1930    Encounter Date: 2024    Subjective  Patient ID: Amari Boyer is a 89 y.o. male.    Patient seen and examined at bedside.  He regards that he is overall doing well, working well with therapy.  He is wondering about the workup for his facial mass.  Would like a biopsy, however is not sure what he would do if this is cancerous.  Otherwise, endorses no additional issues at this time and overall feels well.        Review of Systems   Constitutional: Negative.    HENT: Negative.     Respiratory: Negative.     Cardiovascular: Negative.    Gastrointestinal: Negative.    Musculoskeletal: Negative.    Skin: Negative.    Neurological: Negative.    Psychiatric/Behavioral: Negative.         ObjectiveVitals Reviewed via facility EMR   Physical Exam  Constitutional:       General: He is not in acute distress.     Appearance: He is not ill-appearing.      Comments: Elderly male, pleasant   Eyes:      Pupils: Pupils are equal, round, and reactive to light.   Cardiovascular:      Rate and Rhythm: Normal rate and regular rhythm.      Pulses: Normal pulses.      Heart sounds: No murmur heard.  Pulmonary:      Effort: No respiratory distress.      Breath sounds: No wheezing.   Abdominal:      General: Abdomen is flat. Bowel sounds are normal. There is no distension.   Musculoskeletal:      Right lower leg: No edema.      Left lower leg: No edema.   Skin:     General: Skin is warm and dry.   Neurological:      Mental Status: He is alert. Mental status is at baseline.      Cranial Nerves: No cranial nerve deficit.      Motor: No weakness.   Psychiatric:         Mood and Affect: Mood normal.         Behavior: Behavior normal.         Assessment/Plan  Diagnoses and all orders for this visit:  Paroxysmal atrial fibrillation (Multi)  Chronic diastolic heart failure (Multi)  Mouth lesion  CKD (chronic kidney disease) stage 4, GFR 15-29 ml/min (Multi)  Weakness    Patient seen and  examined, overall medically stable.  Continue supportive care, routine labs.  Plan follow-up with ears nose and throat, I am unsure if the mass is cancerous however would need a biopsy to fully confirm.  Due to patient's advanceded age and multiple comorbidities would need to weigh benefits and risks of treatment options available if this lesion were to be cancerous.  Otherwise, continue supportive care, routine labs    Reviewed and approved by SONG DURAN on 4/20/24 at 9:24 PM.         Electronically Signed By: Song Duran DO   4/20/24  9:25 PM

## 2024-04-21 NOTE — PROGRESS NOTES
Subjective   Patient ID: Amari Boyer is a 89 y.o. male.    Patient seen and examined at bedside.  He regards that he is overall doing well, working well with therapy.  He is wondering about the workup for his facial mass.  Would like a biopsy, however is not sure what he would do if this is cancerous.  Otherwise, endorses no additional issues at this time and overall feels well.        Review of Systems   Constitutional: Negative.    HENT: Negative.     Respiratory: Negative.     Cardiovascular: Negative.    Gastrointestinal: Negative.    Musculoskeletal: Negative.    Skin: Negative.    Neurological: Negative.    Psychiatric/Behavioral: Negative.         Objective Vitals Reviewed via facility EMR   Physical Exam  Constitutional:       General: He is not in acute distress.     Appearance: He is not ill-appearing.      Comments: Elderly male, pleasant   Eyes:      Pupils: Pupils are equal, round, and reactive to light.   Cardiovascular:      Rate and Rhythm: Normal rate and regular rhythm.      Pulses: Normal pulses.      Heart sounds: No murmur heard.  Pulmonary:      Effort: No respiratory distress.      Breath sounds: No wheezing.   Abdominal:      General: Abdomen is flat. Bowel sounds are normal. There is no distension.   Musculoskeletal:      Right lower leg: No edema.      Left lower leg: No edema.   Skin:     General: Skin is warm and dry.   Neurological:      Mental Status: He is alert. Mental status is at baseline.      Cranial Nerves: No cranial nerve deficit.      Motor: No weakness.   Psychiatric:         Mood and Affect: Mood normal.         Behavior: Behavior normal.         Assessment/Plan   Diagnoses and all orders for this visit:  Paroxysmal atrial fibrillation (Multi)  Chronic diastolic heart failure (Multi)  Mouth lesion  CKD (chronic kidney disease) stage 4, GFR 15-29 ml/min (Multi)  Weakness    Patient seen and examined, overall medically stable.  Continue supportive care, routine labs.   Plan follow-up with ears nose and throat, I am unsure if the mass is cancerous however would need a biopsy to fully confirm.  Due to patient's advanceded age and multiple comorbidities would need to weigh benefits and risks of treatment options available if this lesion were to be cancerous.  Otherwise, continue supportive care, routine labs    Reviewed and approved by TERRI DURAN on 4/20/24 at 9:24 PM.

## 2024-04-22 NOTE — LETTER
Patient: Amari Boyer  : 10/3/1930    Encounter Date: 2024    Subjective  Patient ID: Amari Boyer is a 89 y.o. male.    Patient seen and examined at bedside.  He regards that he is overall doing well but is having some issues with frequent urination.  States that he is unable to get to the restroom in time prior to urinating.  This has been ongoing for the past couple days.  UA recently did show positive nitrites. Does endorse some intermittent constipation.  Otherwise, regards no additional issues or concerns.        Review of Systems   Constitutional: Negative.    HENT: Negative.     Respiratory: Negative.     Cardiovascular: Negative.    Gastrointestinal:  Positive for abdominal pain and constipation.   Genitourinary:  Positive for dysuria and frequency.   Musculoskeletal: Negative.    Skin: Negative.    Neurological: Negative.    Psychiatric/Behavioral: Negative.         ObjectiveVitals Reviewed via facility EMR   Physical Exam  Constitutional:       General: He is not in acute distress.     Appearance: He is not ill-appearing.   Eyes:      Pupils: Pupils are equal, round, and reactive to light.   Cardiovascular:      Rate and Rhythm: Normal rate and regular rhythm.      Pulses: Normal pulses.      Heart sounds: No murmur heard.  Pulmonary:      Effort: No respiratory distress.      Breath sounds: No wheezing.   Abdominal:      General: Abdomen is flat. There is no distension.      Comments: Slight dec bowel sounds   Genitourinary:     Comments: Slight suprapubic pain  Musculoskeletal:      Right lower leg: No edema.      Left lower leg: No edema.   Skin:     General: Skin is warm and dry.   Neurological:      Mental Status: He is alert. Mental status is at baseline.      Cranial Nerves: No cranial nerve deficit.      Motor: No weakness.   Psychiatric:         Mood and Affect: Mood normal.         Behavior: Behavior normal.         Assessment/Plan  Diagnoses and all orders for this  visit:  S/P CABG x 3  Paroxysmal atrial fibrillation (Multi)  Essential hypertension  Type 2 diabetes mellitus with chronic kidney disease, without long-term current use of insulin, unspecified CKD stage (Multi)  Cystitis  Stage 3b chronic kidney disease (Multi)  Pt seen and examined. Is having some dysuria and frequency. Will treat with course of Cipro. Plan on follow up with ENT. Continue supportive care. Discussed with patient that his bowel regimen is ordered prn and that he will need to ask staff about this. If this continues to become an issue recommend routine dosing. Optimize electrolytes. No additional issues at this time.    Reviewed and approved by SONG DURAN on 4/24/24 at 10:33 PM.         Electronically Signed By: Song Duran DO   4/24/24 10:33 PM

## 2024-04-23 NOTE — PROGRESS NOTES
Occupational Therapy    Discharge Summary    Name: Amari Boyer  MRN: [id number]  : 10/3/1930  Date: 24    Discharge Summary: OT    Discharge Information: Date of last visit 2024, Date of evaluation 2023, and Number of attended visits 4    Therapy Summary:   Pt made progress, velcro compression garments ordered.      Rehab Discharge Reason:   Pt admit to SNF 3/17/2024

## 2024-04-24 PROBLEM — N30.90 CYSTITIS: Status: ACTIVE | Noted: 2024-01-01

## 2024-04-24 NOTE — LETTER
"Patient: Amari Boyer  : 10/3/1930    Encounter Date: 2024    Subjective  Patient ID: Amari Boyer is a 89 y.o. male.    Patient seen and examined at bedside.  He reports that he is overall doing well, urinary issues have subsequently improved with initiation of Cipro.  He feels like he is not going to the bathroom as much.  However, his most recent issue, has started noticing some issues with constipation.  He feels like at times he cannot fully get out of the bowel movement.  Is having bowel movements however he feels like they are \"small trista.\"  Otherwise, states he is doing well with physical therapy.  Awaiting appointment with ENT.        Review of Systems   Constitutional: Negative.    HENT: Negative.     Respiratory: Negative.     Cardiovascular: Negative.    Gastrointestinal:  Positive for constipation and nausea.   Genitourinary:         Improved urinary symptoms   Musculoskeletal: Negative.    Skin: Negative.    Neurological: Negative.    Psychiatric/Behavioral: Negative.         ObjectiveVitals Reviewed via facility EMR   Physical Exam  Constitutional:       General: He is not in acute distress.     Appearance: He is not ill-appearing.      Comments: Elderly male   Eyes:      Pupils: Pupils are equal, round, and reactive to light.   Cardiovascular:      Rate and Rhythm: Normal rate and regular rhythm.      Pulses: Normal pulses.      Heart sounds: No murmur heard.  Pulmonary:      Effort: No respiratory distress.      Breath sounds: No wheezing.   Abdominal:      General: Abdomen is flat. Bowel sounds are normal. There is no distension.   Musculoskeletal:      Right lower leg: No edema.      Left lower leg: No edema.   Skin:     General: Skin is warm and dry.   Neurological:      Mental Status: He is alert. Mental status is at baseline.      Cranial Nerves: No cranial nerve deficit.      Motor: No weakness.   Psychiatric:         Mood and Affect: Mood normal.         Behavior: " Behavior normal.         Assessment/Plan  Diagnoses and all orders for this visit:  Paroxysmal atrial fibrillation (Multi)  Essential hypertension  Dyslipidemia  Type 2 diabetes mellitus with chronic kidney disease, without long-term current use of insulin, unspecified CKD stage (Multi)  Slow transit constipation  Cystitis        Patient seen and examined.  Overall doing well, improved with a course of antibiotics for underlying UTI.  Will obtain KUB due to underlying constipation.  Discussed with patient that he is able to ask for as needed medications, however if KUB does not show signs of obstruction, will prescribe suppository.  Continue to optimize electrolytes especially potassium and magnesium levels.  Continue supportive care.    Reviewed and approved by SONG DURAN on 4/25/24 at 9:34 PM.       Electronically Signed By: Song Duran DO   4/25/24  9:34 PM

## 2024-04-25 NOTE — PROGRESS NOTES
Subjective   Patient ID: Amari Boyer is a 89 y.o. male.    Patient seen and examined at bedside.  He regards that he is overall doing well but is having some issues with frequent urination.  States that he is unable to get to the restroom in time prior to urinating.  This has been ongoing for the past couple days.  UA recently did show positive nitrites. Does endorse some intermittent constipation.  Otherwise, regards no additional issues or concerns.        Review of Systems   Constitutional: Negative.    HENT: Negative.     Respiratory: Negative.     Cardiovascular: Negative.    Gastrointestinal:  Positive for abdominal pain and constipation.   Genitourinary:  Positive for dysuria and frequency.   Musculoskeletal: Negative.    Skin: Negative.    Neurological: Negative.    Psychiatric/Behavioral: Negative.         Objective Vitals Reviewed via facility EMR   Physical Exam  Constitutional:       General: He is not in acute distress.     Appearance: He is not ill-appearing.   Eyes:      Pupils: Pupils are equal, round, and reactive to light.   Cardiovascular:      Rate and Rhythm: Normal rate and regular rhythm.      Pulses: Normal pulses.      Heart sounds: No murmur heard.  Pulmonary:      Effort: No respiratory distress.      Breath sounds: No wheezing.   Abdominal:      General: Abdomen is flat. There is no distension.      Comments: Slight dec bowel sounds   Genitourinary:     Comments: Slight suprapubic pain  Musculoskeletal:      Right lower leg: No edema.      Left lower leg: No edema.   Skin:     General: Skin is warm and dry.   Neurological:      Mental Status: He is alert. Mental status is at baseline.      Cranial Nerves: No cranial nerve deficit.      Motor: No weakness.   Psychiatric:         Mood and Affect: Mood normal.         Behavior: Behavior normal.         Assessment/Plan   Diagnoses and all orders for this visit:  S/P CABG x 3  Paroxysmal atrial fibrillation (Multi)  Essential  hypertension  Type 2 diabetes mellitus with chronic kidney disease, without long-term current use of insulin, unspecified CKD stage (Multi)  Cystitis  Stage 3b chronic kidney disease (Multi)  Pt seen and examined. Is having some dysuria and frequency. Will treat with course of Cipro. Plan on follow up with ENT. Continue supportive care. Discussed with patient that his bowel regimen is ordered prn and that he will need to ask staff about this. If this continues to become an issue recommend routine dosing. Optimize electrolytes. No additional issues at this time.    Reviewed and approved by TERRI DURAN on 4/24/24 at 10:33 PM.

## 2024-04-26 NOTE — PROGRESS NOTES
"Subjective   Patient ID: Amari Boyer is a 89 y.o. male.    Patient seen and examined at bedside.  He reports that he is overall doing well, urinary issues have subsequently improved with initiation of Cipro.  He feels like he is not going to the bathroom as much.  However, his most recent issue, has started noticing some issues with constipation.  He feels like at times he cannot fully get out of the bowel movement.  Is having bowel movements however he feels like they are \"small trista.\"  Otherwise, states he is doing well with physical therapy.  Awaiting appointment with ENT.        Review of Systems   Constitutional: Negative.    HENT: Negative.     Respiratory: Negative.     Cardiovascular: Negative.    Gastrointestinal:  Positive for constipation and nausea.   Genitourinary:         Improved urinary symptoms   Musculoskeletal: Negative.    Skin: Negative.    Neurological: Negative.    Psychiatric/Behavioral: Negative.         Objective Vitals Reviewed via facility EMR   Physical Exam  Constitutional:       General: He is not in acute distress.     Appearance: He is not ill-appearing.      Comments: Elderly male   Eyes:      Pupils: Pupils are equal, round, and reactive to light.   Cardiovascular:      Rate and Rhythm: Normal rate and regular rhythm.      Pulses: Normal pulses.      Heart sounds: No murmur heard.  Pulmonary:      Effort: No respiratory distress.      Breath sounds: No wheezing.   Abdominal:      General: Abdomen is flat. Bowel sounds are normal. There is no distension.   Musculoskeletal:      Right lower leg: No edema.      Left lower leg: No edema.   Skin:     General: Skin is warm and dry.   Neurological:      Mental Status: He is alert. Mental status is at baseline.      Cranial Nerves: No cranial nerve deficit.      Motor: No weakness.   Psychiatric:         Mood and Affect: Mood normal.         Behavior: Behavior normal.         Assessment/Plan   Diagnoses and all orders for this " visit:  Paroxysmal atrial fibrillation (Multi)  Essential hypertension  Dyslipidemia  Type 2 diabetes mellitus with chronic kidney disease, without long-term current use of insulin, unspecified CKD stage (Multi)  Slow transit constipation  Cystitis        Patient seen and examined.  Overall doing well, improved with a course of antibiotics for underlying UTI.  Will obtain KUB due to underlying constipation.  Discussed with patient that he is able to ask for as needed medications, however if KUB does not show signs of obstruction, will prescribe suppository.  Continue to optimize electrolytes especially potassium and magnesium levels.  Continue supportive care.    Reviewed and approved by TERRI DURAN on 4/25/24 at 9:34 PM.

## 2024-04-29 PROBLEM — M27.0 TORUS PALATINUS: Status: ACTIVE | Noted: 2024-01-01

## 2024-04-29 NOTE — PROGRESS NOTES
Amari Boyer is a 89 y.o. year old male patient with ROOF OF MOUTH LESION     Patient presents to the office today for assessment of mouth.  Patient with concern over possible roof of mouth lesion.  He is complaining with some occasional pain in the region with swallow.  All other ENT issues are negative.  The patient is a former smoker who quit nearly 60 years ago.  There have been no significant changes in past medical or past surgical histories except as mentioned.          Physical Exam:   No acute distress  The external ear structures appear normal. The ear canals patent and the tympanic membranes are intact without evidence of air-fluid levels, retraction, or congenital defects.  Anterior rhinoscopy notes essentially a midline nasal septum. Examination is noted for normal healthy mucosal membranes without any evidence of lesions, polyps, or exudate. The tongue is normally mobile. There are no lesions on the gingiva, buccal, or oral mucosa. There are no oral cavity masses.  Patient with palatal magalie but otherwise unremarkable.  The neck is negative for mass lymphadenopathy. The trachea and parotid are clear. The thyroid bed is grossly unremarkable. The salivary gland structures are grossly unremarkable.  The laryngeal structures are noted for grossly normal appearance. The true vocal cords, the false focal cords, and the remaining structures including the epiglottis, piriform sinuses, and base of tongue are all grossly normal. There is no evidence of gross mass nodule, polyp, tumor or other defect.    Assessment/Plan     Palatal magalie  Patient seen in the office today for assessment of his mouth.  Patient does have magalie present on the palate but otherwise unremarkable.  This includes mirror examination of the larynx.  I recommend observation and see me back should changes arise.

## 2024-05-01 NOTE — LETTER
Patient: Amari Boyer  : 10/3/1930    Encounter Date: 2024    Subjective  Patient ID: Amari Boyer is a 89 y.o. male.    Patient seen and examined at bedside.  Regards that he is doing well with physical therapy, and physical therapy does note that he has made a significant improvement as well.  Recent bowel regimen, reports that he did have a bowel movement and feels much better with regards to this.  Otherwise, he does report some significant congestion as of late.  States they feel slightly sore breath and does have a slight cough.        Review of Systems   Constitutional: Negative.    HENT:  Positive for congestion.    Respiratory:  Positive for cough.    Cardiovascular: Negative.    Gastrointestinal: Negative.    Musculoskeletal: Negative.    Skin: Negative.    Neurological: Negative.    Psychiatric/Behavioral: Negative.         ObjectiveVitals Reviewed via facility EMR   Physical Exam  Constitutional:       General: He is not in acute distress.     Appearance: He is not ill-appearing.   Eyes:      Pupils: Pupils are equal, round, and reactive to light.   Cardiovascular:      Rate and Rhythm: Normal rate and regular rhythm.      Pulses: Normal pulses.      Heart sounds: No murmur heard.  Pulmonary:      Effort: No respiratory distress.      Breath sounds: No wheezing.      Comments: Slight dec breath sounds  Abdominal:      General: Abdomen is flat. Bowel sounds are normal. There is no distension.   Musculoskeletal:      Right lower leg: No edema.      Left lower leg: No edema.   Skin:     General: Skin is warm and dry.   Neurological:      Mental Status: He is alert. Mental status is at baseline.      Cranial Nerves: No cranial nerve deficit.      Motor: No weakness.   Psychiatric:         Mood and Affect: Mood normal.         Behavior: Behavior normal.         Assessment/Plan  Diagnoses and all orders for this visit:  Chronic diastolic heart failure (Multi)  Dyslipidemia  Type 2 diabetes  mellitus with chronic kidney disease, without long-term current use of insulin, unspecified CKD stage (Multi)  Stage 3b chronic kidney disease (Multi)  Pneumonia of both lower lobes due to infectious organism      Patient seen and examined.  As per HPI, is overall improving with physical therapy.  Note slight congestion today, check check x-ray.  Continue current bowel regimen.  Continue supportive care, PT OT.  Post visit, patient's x-ray did reveal slight infiltrates, started on course of Augmentin and as well as breathing treatments.  Continue supportive care, staff updated with care plan and is agreeable.    Post visit  Chest x-ray positive for infiltrate, procal is pending, start on abx    Reviewed and approved by SONG DURAN on 5/4/24 at 2:32 PM.       Electronically Signed By: Song Duran DO   5/4/24  2:32 PM

## 2024-05-03 NOTE — LETTER
Patient: Amari Boyer  : 10/3/1930    Encounter Date: 2024    Subjective  Patient ID: Amari Boyer is a 89 y.o. male.    Patient seen and examined at bedside.  Regards that he is overall doing well, is slightly improving from his underlying congestion and cough.  States that his strength is overall stable.  Otherwise, regards that he is slowly improving with physical therapy, however, states that he has been considering hospice care for himself due to not wanting to be admitted back to the hospital.  This was discussed with his son who is his POA who reports that they do want to help with their dad's quality of life.  They would like to avoid further hospitalizations as well.  They are interested in hospice care, however unsure how to appropriately navigate this and what services would be available to them.  Otherwise, reports no additional issues or concerns.  He is overall doing well.        Review of Systems   Constitutional: Negative.    HENT: Negative.     Respiratory: Negative.     Cardiovascular: Negative.    Gastrointestinal: Negative.    Musculoskeletal: Negative.    Skin: Negative.    Neurological: Negative.    Psychiatric/Behavioral: Negative.         ObjectiveVitals Reviewed via facility EMR   Physical Exam  Constitutional:       General: He is not in acute distress.     Appearance: He is not ill-appearing.      Comments: Elderly male   Eyes:      Pupils: Pupils are equal, round, and reactive to light.   Cardiovascular:      Rate and Rhythm: Normal rate and regular rhythm.      Pulses: Normal pulses.      Heart sounds: No murmur heard.  Pulmonary:      Effort: No respiratory distress.      Breath sounds: Wheezing present.      Comments: Slight dec respiratory sounds, wheezing  Abdominal:      General: Abdomen is flat. Bowel sounds are normal. There is no distension.   Musculoskeletal:      Right lower leg: No edema.      Left lower leg: No edema.   Skin:     General: Skin is warm and  dry.   Neurological:      Mental Status: He is alert. Mental status is at baseline.      Cranial Nerves: No cranial nerve deficit.      Motor: No weakness.   Psychiatric:         Mood and Affect: Mood normal.         Behavior: Behavior normal.         Assessment/Plan  Diagnoses and all orders for this visit:  S/P CABG x 3  Type 2 diabetes mellitus with chronic kidney disease, without long-term current use of insulin, unspecified CKD stage (Multi)  CKD (chronic kidney disease) stage 4, GFR 15-29 ml/min (Multi)  Pneumonia of both lower lobes due to infectious organism  Abnormal posture  Goals of care, counseling/discussion      Patient seen and examined at bedside.  Procalcitonin noted to be elevated, completed course of antibiotics as there is suspected underlying infection going on and would benefit from further antibiotics.  As per HPI, discussed goals of care planning with the family as well as patient himself.  There are considerations for hospice care, due to advanced age and multiple medical core above abilities, would be appropriate in this situation.  Will consult palliative and hospice for further evaluation and recommendations.  Continue supportive care.  PT OT.  Appreciate palliative recommendations.    Reviewed and approved by SONG DURAN on 5/4/24 at 8:20 PM.       Electronically Signed By: Song Duran DO   5/4/24  8:20 PM

## 2024-05-04 PROBLEM — Z71.89 GOALS OF CARE, COUNSELING/DISCUSSION: Status: ACTIVE | Noted: 2024-01-01

## 2024-05-04 PROBLEM — J18.9 PNEUMONIA OF BOTH LOWER LOBES DUE TO INFECTIOUS ORGANISM: Status: ACTIVE | Noted: 2024-01-01

## 2024-05-04 NOTE — PROGRESS NOTES
Subjective   Patient ID: Amari Boyer is a 89 y.o. male.    Patient seen and examined at bedside.  Regards that he is doing well with physical therapy, and physical therapy does note that he has made a significant improvement as well.  Recent bowel regimen, reports that he did have a bowel movement and feels much better with regards to this.  Otherwise, he does report some significant congestion as of late.  States they feel slightly sore breath and does have a slight cough.        Review of Systems   Constitutional: Negative.    HENT:  Positive for congestion.    Respiratory:  Positive for cough.    Cardiovascular: Negative.    Gastrointestinal: Negative.    Musculoskeletal: Negative.    Skin: Negative.    Neurological: Negative.    Psychiatric/Behavioral: Negative.         Objective Vitals Reviewed via facility EMR   Physical Exam  Constitutional:       General: He is not in acute distress.     Appearance: He is not ill-appearing.   Eyes:      Pupils: Pupils are equal, round, and reactive to light.   Cardiovascular:      Rate and Rhythm: Normal rate and regular rhythm.      Pulses: Normal pulses.      Heart sounds: No murmur heard.  Pulmonary:      Effort: No respiratory distress.      Breath sounds: No wheezing.      Comments: Slight dec breath sounds  Abdominal:      General: Abdomen is flat. Bowel sounds are normal. There is no distension.   Musculoskeletal:      Right lower leg: No edema.      Left lower leg: No edema.   Skin:     General: Skin is warm and dry.   Neurological:      Mental Status: He is alert. Mental status is at baseline.      Cranial Nerves: No cranial nerve deficit.      Motor: No weakness.   Psychiatric:         Mood and Affect: Mood normal.         Behavior: Behavior normal.         Assessment/Plan   Diagnoses and all orders for this visit:  Chronic diastolic heart failure (Multi)  Dyslipidemia  Type 2 diabetes mellitus with chronic kidney disease, without long-term current use of  insulin, unspecified CKD stage (Multi)  Stage 3b chronic kidney disease (Multi)  Pneumonia of both lower lobes due to infectious organism      Patient seen and examined.  As per HPI, is overall improving with physical therapy.  Note slight congestion today, check check x-ray.  Continue current bowel regimen.  Continue supportive care, PT OT.  Post visit, patient's x-ray did reveal slight infiltrates, started on course of Augmentin and as well as breathing treatments.  Continue supportive care, staff updated with care plan and is agreeable.    Post visit  Chest x-ray positive for infiltrate, procal is pending, start on abx    Reviewed and approved by TERRI DURAN on 5/4/24 at 2:32 PM.

## 2024-05-05 NOTE — PROGRESS NOTES
Subjective   Patient ID: Amari Boyer is a 89 y.o. male.    Patient seen and examined at bedside.  Regards that he is overall doing well, is slightly improving from his underlying congestion and cough.  States that his strength is overall stable.  Otherwise, regards that he is slowly improving with physical therapy, however, states that he has been considering hospice care for himself due to not wanting to be admitted back to the hospital.  This was discussed with his son who is his POA who reports that they do want to help with their dad's quality of life.  They would like to avoid further hospitalizations as well.  They are interested in hospice care, however unsure how to appropriately navigate this and what services would be available to them.  Otherwise, reports no additional issues or concerns.  He is overall doing well.        Review of Systems   Constitutional: Negative.    HENT: Negative.     Respiratory: Negative.     Cardiovascular: Negative.    Gastrointestinal: Negative.    Musculoskeletal: Negative.    Skin: Negative.    Neurological: Negative.    Psychiatric/Behavioral: Negative.         Objective Vitals Reviewed via facility EMR   Physical Exam  Constitutional:       General: He is not in acute distress.     Appearance: He is not ill-appearing.      Comments: Elderly male   Eyes:      Pupils: Pupils are equal, round, and reactive to light.   Cardiovascular:      Rate and Rhythm: Normal rate and regular rhythm.      Pulses: Normal pulses.      Heart sounds: No murmur heard.  Pulmonary:      Effort: No respiratory distress.      Breath sounds: Wheezing present.      Comments: Slight dec respiratory sounds, wheezing  Abdominal:      General: Abdomen is flat. Bowel sounds are normal. There is no distension.   Musculoskeletal:      Right lower leg: No edema.      Left lower leg: No edema.   Skin:     General: Skin is warm and dry.   Neurological:      Mental Status: He is alert. Mental status is at  baseline.      Cranial Nerves: No cranial nerve deficit.      Motor: No weakness.   Psychiatric:         Mood and Affect: Mood normal.         Behavior: Behavior normal.         Assessment/Plan   Diagnoses and all orders for this visit:  S/P CABG x 3  Type 2 diabetes mellitus with chronic kidney disease, without long-term current use of insulin, unspecified CKD stage (Multi)  CKD (chronic kidney disease) stage 4, GFR 15-29 ml/min (Multi)  Pneumonia of both lower lobes due to infectious organism  Abnormal posture  Goals of care, counseling/discussion      Patient seen and examined at bedside.  Procalcitonin noted to be elevated, completed course of antibiotics as there is suspected underlying infection going on and would benefit from further antibiotics.  As per HPI, discussed goals of care planning with the family as well as patient himself.  There are considerations for hospice care, due to advanced age and multiple medical core above abilities, would be appropriate in this situation.  Will consult palliative and hospice for further evaluation and recommendations.  Continue supportive care.  PT OT.  Appreciate palliative recommendations.    Reviewed and approved by TERRI DURAN on 5/4/24 at 8:20 PM.

## 2024-05-08 NOTE — LETTER
Patient: Amari Boyer  : 10/3/1930    Encounter Date: 2024    Subjective  Patient ID: Amari Boyer is a 89 y.o. male.    Patient seen and evaluated at bedside.  Regards that he is doing overall well, however still having issues with shortness of breath and coughing.  Still feels slightly symptomatic from his underlying pneumonia that he received a full course of antibiotics for.  Otherwise, regards that strength is overall improving.  States no additional issues or concerns at this time.        Review of Systems   Constitutional: Negative.    HENT:  Positive for congestion and postnasal drip.    Respiratory:  Positive for cough.    Cardiovascular: Negative.    Gastrointestinal: Negative.    Musculoskeletal: Negative.    Skin: Negative.    Neurological: Negative.    Psychiatric/Behavioral: Negative.         ObjectiveVitals Reviewed via facility EMR   Physical Exam  Constitutional:       General: He is not in acute distress.     Appearance: He is not ill-appearing.   Eyes:      Pupils: Pupils are equal, round, and reactive to light.   Cardiovascular:      Rate and Rhythm: Normal rate and regular rhythm.      Pulses: Normal pulses.      Heart sounds: No murmur heard.  Pulmonary:      Effort: No respiratory distress.      Breath sounds: No wheezing.      Comments: Productive cough, no wheezing noted  Abdominal:      General: Abdomen is flat. Bowel sounds are normal. There is no distension.   Musculoskeletal:      Right lower leg: No edema.      Left lower leg: No edema.   Skin:     General: Skin is warm and dry.   Neurological:      Mental Status: He is alert. Mental status is at baseline.      Cranial Nerves: No cranial nerve deficit.      Motor: No weakness.   Psychiatric:         Mood and Affect: Mood normal.         Behavior: Behavior normal.         Assessment/Plan  Diagnoses and all orders for this visit:  Dyspnea on exertion  Dyslipidemia  Essential hypertension  Heart failure, unspecified HF  chronicity, unspecified heart failure type (Multi)  Stage 3b chronic kidney disease (Multi)  Pneumonia of both lower lobes due to infectious organism  Weakness    Patient seen and examined.  Cough and congestion and productive cough is likely underlying sequela of recent pneumonia.  Will initiate as needed medications such as Tessalon Perles, Mucinex, and albuterol inhaler.  Prednisone burst of 10 mg over next 5 days.  Closely monitor vitals.  Low threshold for repeat chest x-ray.  Monitor routine labs.  Continue supportive care.    Reviewed and approved by SONG DURAN on 5/9/24 at 9:39 PM.         Electronically Signed By: Song Duran DO   5/9/24  9:39 PM

## 2024-05-10 NOTE — PROGRESS NOTES
Subjective   Patient ID: Amari Boyer is a 89 y.o. male.    Patient seen and evaluated at bedside.  Regards that he is doing overall well, however still having issues with shortness of breath and coughing.  Still feels slightly symptomatic from his underlying pneumonia that he received a full course of antibiotics for.  Otherwise, regards that strength is overall improving.  States no additional issues or concerns at this time.        Review of Systems   Constitutional: Negative.    HENT:  Positive for congestion and postnasal drip.    Respiratory:  Positive for cough.    Cardiovascular: Negative.    Gastrointestinal: Negative.    Musculoskeletal: Negative.    Skin: Negative.    Neurological: Negative.    Psychiatric/Behavioral: Negative.         Objective Vitals Reviewed via facility EMR   Physical Exam  Constitutional:       General: He is not in acute distress.     Appearance: He is not ill-appearing.   Eyes:      Pupils: Pupils are equal, round, and reactive to light.   Cardiovascular:      Rate and Rhythm: Normal rate and regular rhythm.      Pulses: Normal pulses.      Heart sounds: No murmur heard.  Pulmonary:      Effort: No respiratory distress.      Breath sounds: No wheezing.      Comments: Productive cough, no wheezing noted  Abdominal:      General: Abdomen is flat. Bowel sounds are normal. There is no distension.   Musculoskeletal:      Right lower leg: No edema.      Left lower leg: No edema.   Skin:     General: Skin is warm and dry.   Neurological:      Mental Status: He is alert. Mental status is at baseline.      Cranial Nerves: No cranial nerve deficit.      Motor: No weakness.   Psychiatric:         Mood and Affect: Mood normal.         Behavior: Behavior normal.         Assessment/Plan   Diagnoses and all orders for this visit:  Dyspnea on exertion  Dyslipidemia  Essential hypertension  Heart failure, unspecified HF chronicity, unspecified heart failure type (Multi)  Stage 3b chronic  kidney disease (Multi)  Pneumonia of both lower lobes due to infectious organism  Weakness    Patient seen and examined.  Cough and congestion and productive cough is likely underlying sequela of recent pneumonia.  Will initiate as needed medications such as Tessalon Perles, Mucinex, and albuterol inhaler.  Prednisone burst of 10 mg over next 5 days.  Closely monitor vitals.  Low threshold for repeat chest x-ray.  Monitor routine labs.  Continue supportive care.    Reviewed and approved by TERRI DURAN on 5/9/24 at 9:39 PM.

## 2024-05-15 NOTE — LETTER
Patient: Amari Boyer  : 10/3/1930    Encounter Date: 05/15/2024    Subjective  Patient ID: Amari Boyer is a 89 y.o. male.    Patient seen and examined at bedside.  Regards that he is doing overall well, however is having a couple issues today.  States that his first thing that he is worried about is that he again is having some difficulty with urination.  Frequently urinates, and is slightly frustrated about this.  In addition, is also slightly depressed secondary to his underlying deterioration of physical status.  Otherwise, regards no additional issues or concerns at this time.        Review of Systems   Constitutional: Negative.    HENT: Negative.     Respiratory:  Positive for cough.    Cardiovascular: Negative.    Gastrointestinal: Negative.    Musculoskeletal: Negative.    Skin: Negative.    Neurological: Negative.    Psychiatric/Behavioral:  Positive for behavioral problems and dysphoric mood. The patient is nervous/anxious.        ObjectiveVitals Reviewed via facility EMR   Physical Exam  Constitutional:       General: He is not in acute distress.     Appearance: He is not ill-appearing.   Eyes:      Pupils: Pupils are equal, round, and reactive to light.   Cardiovascular:      Rate and Rhythm: Normal rate and regular rhythm.      Pulses: Normal pulses.      Heart sounds: No murmur heard.  Pulmonary:      Effort: No respiratory distress.      Breath sounds: No wheezing.      Comments: Mild cough  Abdominal:      General: Abdomen is flat. Bowel sounds are normal. There is no distension.   Musculoskeletal:      Right lower leg: No edema.      Left lower leg: No edema.   Skin:     General: Skin is warm and dry.   Neurological:      Mental Status: He is alert. Mental status is at baseline.      Cranial Nerves: No cranial nerve deficit.      Motor: No weakness.   Psychiatric:         Mood and Affect: Mood normal.         Behavior: Behavior normal.      Comments: Flat affect          Assessment/Plan  Diagnoses and all orders for this visit:  Essential hypertension  Heart failure, unspecified HF chronicity, unspecified heart failure type (Multi)  Type 2 diabetes mellitus with chronic kidney disease, without long-term current use of insulin, unspecified CKD stage (Multi)  CKD (chronic kidney disease) stage 4, GFR 15-29 ml/min (Multi)  Weakness  Frequency of urination        Patient seen and examined at bedside.  Regards that he is overall doing well.  Suspect urination could be medication side effect as he is on follow-up max.  We will discontinue this medication and monitor symptoms.  However will also check a UA as patient did have UTIs in the past.  Is endorsing some issues with depression, we will initiate Lexapro 10 mg.  Will closely monitor mood.  Continue supportive care.    Reviewed and approved by SONG DURAN on 5/18/24 at 12:56 PM.       Electronically Signed By: Song Duran DO   5/18/24 12:56 PM

## 2024-05-18 PROBLEM — F32.1 CURRENT MODERATE EPISODE OF MAJOR DEPRESSIVE DISORDER WITHOUT PRIOR EPISODE (MULTI): Status: ACTIVE | Noted: 2024-01-01

## 2024-05-18 PROBLEM — R35.0 FREQUENCY OF URINATION: Status: ACTIVE | Noted: 2024-01-01

## 2024-05-18 NOTE — PROGRESS NOTES
Subjective   Patient ID: Amari Boyer is a 89 y.o. male.    Patient seen and examined at bedside.  Regards that he is doing overall well, however is having a couple issues today.  States that his first thing that he is worried about is that he again is having some difficulty with urination.  Frequently urinates, and is slightly frustrated about this.  In addition, is also slightly depressed secondary to his underlying deterioration of physical status.  Otherwise, regards no additional issues or concerns at this time.        Review of Systems   Constitutional: Negative.    HENT: Negative.     Respiratory:  Positive for cough.    Cardiovascular: Negative.    Gastrointestinal: Negative.    Musculoskeletal: Negative.    Skin: Negative.    Neurological: Negative.    Psychiatric/Behavioral:  Positive for behavioral problems and dysphoric mood. The patient is nervous/anxious.        Objective Vitals Reviewed via facility EMR   Physical Exam  Constitutional:       General: He is not in acute distress.     Appearance: He is not ill-appearing.   Eyes:      Pupils: Pupils are equal, round, and reactive to light.   Cardiovascular:      Rate and Rhythm: Normal rate and regular rhythm.      Pulses: Normal pulses.      Heart sounds: No murmur heard.  Pulmonary:      Effort: No respiratory distress.      Breath sounds: No wheezing.      Comments: Mild cough  Abdominal:      General: Abdomen is flat. Bowel sounds are normal. There is no distension.   Musculoskeletal:      Right lower leg: No edema.      Left lower leg: No edema.   Skin:     General: Skin is warm and dry.   Neurological:      Mental Status: He is alert. Mental status is at baseline.      Cranial Nerves: No cranial nerve deficit.      Motor: No weakness.   Psychiatric:         Mood and Affect: Mood normal.         Behavior: Behavior normal.      Comments: Flat affect         Assessment/Plan   Diagnoses and all orders for this visit:  Essential  hypertension  Heart failure, unspecified HF chronicity, unspecified heart failure type (Multi)  Type 2 diabetes mellitus with chronic kidney disease, without long-term current use of insulin, unspecified CKD stage (Multi)  CKD (chronic kidney disease) stage 4, GFR 15-29 ml/min (Multi)  Weakness  Frequency of urination        Patient seen and examined at bedside.  Regards that he is overall doing well.  Suspect urination could be medication side effect as he is on follow-up max.  We will discontinue this medication and monitor symptoms.  However will also check a UA as patient did have UTIs in the past.  Is endorsing some issues with depression, we will initiate Lexapro 10 mg.  Will closely monitor mood.  Continue supportive care.    Reviewed and approved by TERRI DURAN on 5/18/24 at 12:56 PM.

## 2024-05-29 NOTE — LETTER
Patient: Amari Boyer  : 10/3/1930    Encounter Date: 2024    Subjective  Patient ID: Amari Boyer is a 89 y.o. male.    Patient seen and examined at bedside.  Recently, goals of care conversation was held, and family has ultimately elected for hospice admission for the patient.  Due to this, recent comfort medications were initiated including Ativan as well as morphine for the patient.  Overnight, patient did receive 2 or 3 doses of this, and throughout the day today has been more lethargic than usual.  In addition, patient started noticing an area around the posterior aspect of his left ear.  Somewhat tender to palpitation and has been growing as well.  Otherwise, states no additional issues or concerns.        Review of Systems   Reason unable to perform ROS: unable to obtain due to ams.       ObjectiveVitals Reviewed via facility EMR   Physical Exam  Constitutional:       Appearance: He is ill-appearing.      Comments: Poorly responsive   HENT:      Head:      Comments: Unable to swallow, drooling, swelling around R orbit, erythema  Eyes:      Pupils: Pupils are equal, round, and reactive to light.   Cardiovascular:      Rate and Rhythm: Normal rate and regular rhythm.      Heart sounds: No murmur heard.  Pulmonary:      Effort: No respiratory distress.      Breath sounds: Wheezing present.   Abdominal:      General: Abdomen is flat. Bowel sounds are normal. There is no distension.   Skin:     General: Skin is warm and dry.   Neurological:      Mental Status: Mental status is at baseline.      Cranial Nerves: No cranial nerve deficit.      Motor: Weakness present.   Psychiatric:         Mood and Affect: Mood normal.         Assessment/Plan  Diagnoses and all orders for this visit:  Encounter for hospice care  Paroxysmal atrial fibrillation (Multi)  Dyslipidemia  Dyspnea on exertion  Stage 3b chronic kidney disease (Multi)  Weakness  Dysphagia, unspecified type  Acute metabolic  encephalopathy    Patient seen and examined at bedside.  Agree with hospice evaluation, however suspect patient more lethargic secondary to being opioid naïve and being initiated on opioid medications.  Will titrate medications down to every 6 hours and closely monitor.  Suspect underlying abscess or cellulitic like area behind posterior left ear.  Will initiate course of Augmentin.  Continue supportive care.  Staff updated with care plan as well as hospice agency and is agreeable.      Reviewed and approved by SONG DURAN on 5/31/24 at 9:30 PM.       Electronically Signed By: Song Duran DO   5/31/24  9:30 PM

## 2024-05-30 ENCOUNTER — TELEPHONE (OUTPATIENT)
Dept: PRIMARY CARE | Facility: CLINIC | Age: 89
End: 2024-05-30
Payer: MEDICARE

## 2024-05-30 NOTE — TELEPHONE ENCOUNTER
Pt son, Nikko, left  stating pt passed away yesterday at [facility] facility.    Voiced appreciation to both you and Dr. Clark for your care.

## 2024-05-31 PROBLEM — G93.41 ACUTE METABOLIC ENCEPHALOPATHY: Status: ACTIVE | Noted: 2024-05-31

## 2024-05-31 PROBLEM — R13.10 DYSPHAGIA: Status: ACTIVE | Noted: 2024-05-31

## 2024-05-31 PROBLEM — Z51.5 ENCOUNTER FOR HOSPICE CARE: Status: ACTIVE | Noted: 2024-05-31

## 2024-06-01 NOTE — PROGRESS NOTES
Subjective   Patient ID: Amari Boyer is a 89 y.o. male.    Patient seen and examined at bedside.  Recently, goals of care conversation was held, and family has ultimately elected for hospice admission for the patient.  Due to this, recent comfort medications were initiated including Ativan as well as morphine for the patient.  Overnight, patient did receive 2 or 3 doses of this, and throughout the day today has been more lethargic than usual.  In addition, patient started noticing an area around the posterior aspect of his left ear.  Somewhat tender to palpitation and has been growing as well.  Otherwise, states no additional issues or concerns.        Review of Systems   Reason unable to perform ROS: unable to obtain due to ams.       Objective Vitals Reviewed via facility EMR   Physical Exam  Constitutional:       Appearance: He is ill-appearing.      Comments: Poorly responsive   HENT:      Head:      Comments: Unable to swallow, drooling, swelling around R orbit, erythema  Eyes:      Pupils: Pupils are equal, round, and reactive to light.   Cardiovascular:      Rate and Rhythm: Normal rate and regular rhythm.      Heart sounds: No murmur heard.  Pulmonary:      Effort: No respiratory distress.      Breath sounds: Wheezing present.   Abdominal:      General: Abdomen is flat. Bowel sounds are normal. There is no distension.   Skin:     General: Skin is warm and dry.   Neurological:      Mental Status: Mental status is at baseline.      Cranial Nerves: No cranial nerve deficit.      Motor: Weakness present.   Psychiatric:         Mood and Affect: Mood normal.         Assessment/Plan   Diagnoses and all orders for this visit:  Encounter for hospice care  Paroxysmal atrial fibrillation (Multi)  Dyslipidemia  Dyspnea on exertion  Stage 3b chronic kidney disease (Multi)  Weakness  Dysphagia, unspecified type  Acute metabolic encephalopathy    Patient seen and examined at bedside.  Agree with hospice evaluation,  however suspect patient more lethargic secondary to being opioid naïve and being initiated on opioid medications.  Will titrate medications down to every 6 hours and closely monitor.  Suspect underlying abscess or cellulitic like area behind posterior left ear.  Will initiate course of Augmentin.  Continue supportive care.  Staff updated with care plan as well as hospice agency and is agreeable.      Reviewed and approved by TERRI DURAN on 5/31/24 at 9:30 PM.

## 2024-07-30 ENCOUNTER — APPOINTMENT (OUTPATIENT)
Dept: CARDIOLOGY | Facility: CLINIC | Age: 89
End: 2024-07-30
Payer: MEDICARE

## 2024-10-01 ENCOUNTER — APPOINTMENT (OUTPATIENT)
Dept: CARDIOLOGY | Facility: CLINIC | Age: 89
End: 2024-10-01
Payer: MEDICARE